# Patient Record
Sex: FEMALE | Race: WHITE | Employment: OTHER | URBAN - METROPOLITAN AREA
[De-identification: names, ages, dates, MRNs, and addresses within clinical notes are randomized per-mention and may not be internally consistent; named-entity substitution may affect disease eponyms.]

---

## 2017-02-12 ENCOUNTER — APPOINTMENT (OUTPATIENT)
Dept: GENERAL RADIOLOGY | Facility: CLINIC | Age: 72
End: 2017-02-12
Attending: EMERGENCY MEDICINE
Payer: COMMERCIAL

## 2017-02-12 ENCOUNTER — HOSPITAL ENCOUNTER (OUTPATIENT)
Facility: CLINIC | Age: 72
Setting detail: OBSERVATION
Discharge: HOME | End: 2017-02-13
Attending: EMERGENCY MEDICINE | Admitting: EMERGENCY MEDICINE
Payer: COMMERCIAL

## 2017-02-12 DIAGNOSIS — R07.9 CHEST PAIN, UNSPECIFIED TYPE: ICD-10-CM

## 2017-02-12 DIAGNOSIS — F25.0 SCHIZOAFFECTIVE DISORDER, BIPOLAR TYPE (H): ICD-10-CM

## 2017-02-12 DIAGNOSIS — J44.9 CHRONIC OBSTRUCTIVE PULMONARY DISEASE, UNSPECIFIED COPD TYPE (H): ICD-10-CM

## 2017-02-12 DIAGNOSIS — J44.1 COPD EXACERBATION (H): ICD-10-CM

## 2017-02-12 DIAGNOSIS — F25.9 SCHIZOAFFECTIVE DISORDER, UNSPECIFIED TYPE (H): ICD-10-CM

## 2017-02-12 LAB
ANION GAP SERPL CALCULATED.3IONS-SCNC: 6 MMOL/L (ref 3–14)
BASOPHILS # BLD AUTO: 0 10E9/L (ref 0–0.2)
BASOPHILS NFR BLD AUTO: 0.5 %
BUN SERPL-MCNC: 20 MG/DL (ref 7–30)
CALCIUM SERPL-MCNC: 8.7 MG/DL (ref 8.5–10.1)
CHLORIDE SERPL-SCNC: 104 MMOL/L (ref 94–109)
CO2 SERPL-SCNC: 33 MMOL/L (ref 20–32)
CREAT SERPL-MCNC: 0.79 MG/DL (ref 0.52–1.04)
D DIMER PPP FEU-MCNC: 0.4 UG/ML FEU (ref 0–0.5)
DIFFERENTIAL METHOD BLD: NORMAL
EOSINOPHIL # BLD AUTO: 0.1 10E9/L (ref 0–0.7)
EOSINOPHIL NFR BLD AUTO: 2.3 %
ERYTHROCYTE [DISTWIDTH] IN BLOOD BY AUTOMATED COUNT: 13.2 % (ref 10–15)
GFR SERPL CREATININE-BSD FRML MDRD: 72 ML/MIN/1.7M2
GLUCOSE SERPL-MCNC: 86 MG/DL (ref 70–99)
HCT VFR BLD AUTO: 42.4 % (ref 35–47)
HGB BLD-MCNC: 13.5 G/DL (ref 11.7–15.7)
IMM GRANULOCYTES # BLD: 0 10E9/L (ref 0–0.4)
IMM GRANULOCYTES NFR BLD: 0.6 %
LIPASE SERPL-CCNC: 149 U/L (ref 73–393)
LYMPHOCYTES # BLD AUTO: 2.5 10E9/L (ref 0.8–5.3)
LYMPHOCYTES NFR BLD AUTO: 41 %
MAGNESIUM SERPL-MCNC: 1.9 MG/DL (ref 1.6–2.3)
MCH RBC QN AUTO: 31.8 PG (ref 26.5–33)
MCHC RBC AUTO-ENTMCNC: 31.8 G/DL (ref 31.5–36.5)
MCV RBC AUTO: 100 FL (ref 78–100)
MONOCYTES # BLD AUTO: 0.5 10E9/L (ref 0–1.3)
MONOCYTES NFR BLD AUTO: 7.9 %
NEUTROPHILS # BLD AUTO: 3 10E9/L (ref 1.6–8.3)
NEUTROPHILS NFR BLD AUTO: 47.7 %
NRBC # BLD AUTO: 0 10*3/UL
NRBC BLD AUTO-RTO: 0 /100
NT-PROBNP SERPL-MCNC: 144 PG/ML (ref 0–900)
PHOSPHATE SERPL-MCNC: 3.6 MG/DL (ref 2.5–4.5)
PLATELET # BLD AUTO: 157 10E9/L (ref 150–450)
POTASSIUM SERPL-SCNC: 4.4 MMOL/L (ref 3.4–5.3)
RBC # BLD AUTO: 4.25 10E12/L (ref 3.8–5.2)
SODIUM SERPL-SCNC: 143 MMOL/L (ref 133–144)
TROPONIN I SERPL-MCNC: 0.02 UG/L (ref 0–0.04)
WBC # BLD AUTO: 6.2 10E9/L (ref 4–11)

## 2017-02-12 PROCEDURE — 84443 ASSAY THYROID STIM HORMONE: CPT | Performed by: PHYSICIAN ASSISTANT

## 2017-02-12 PROCEDURE — 99207 ZZC APP CREDIT; MD BILLING SHARED VISIT: CPT | Mod: Z6 | Performed by: EMERGENCY MEDICINE

## 2017-02-12 PROCEDURE — 36415 COLL VENOUS BLD VENIPUNCTURE: CPT | Performed by: PHYSICIAN ASSISTANT

## 2017-02-12 PROCEDURE — 99285 EMERGENCY DEPT VISIT HI MDM: CPT | Mod: 25 | Performed by: EMERGENCY MEDICINE

## 2017-02-12 PROCEDURE — 83690 ASSAY OF LIPASE: CPT | Performed by: EMERGENCY MEDICINE

## 2017-02-12 PROCEDURE — 93005 ELECTROCARDIOGRAM TRACING: CPT | Performed by: EMERGENCY MEDICINE

## 2017-02-12 PROCEDURE — 83735 ASSAY OF MAGNESIUM: CPT | Performed by: PHYSICIAN ASSISTANT

## 2017-02-12 PROCEDURE — G0378 HOSPITAL OBSERVATION PER HR: HCPCS

## 2017-02-12 PROCEDURE — 84100 ASSAY OF PHOSPHORUS: CPT | Performed by: PHYSICIAN ASSISTANT

## 2017-02-12 PROCEDURE — 93010 ELECTROCARDIOGRAM REPORT: CPT | Mod: Z6 | Performed by: EMERGENCY MEDICINE

## 2017-02-12 PROCEDURE — 80048 BASIC METABOLIC PNL TOTAL CA: CPT | Performed by: EMERGENCY MEDICINE

## 2017-02-12 PROCEDURE — 84484 ASSAY OF TROPONIN QUANT: CPT | Performed by: PHYSICIAN ASSISTANT

## 2017-02-12 PROCEDURE — 85379 FIBRIN DEGRADATION QUANT: CPT | Performed by: EMERGENCY MEDICINE

## 2017-02-12 PROCEDURE — 84100 ASSAY OF PHOSPHORUS: CPT | Performed by: EMERGENCY MEDICINE

## 2017-02-12 PROCEDURE — 84484 ASSAY OF TROPONIN QUANT: CPT | Performed by: EMERGENCY MEDICINE

## 2017-02-12 PROCEDURE — 25000132 ZZH RX MED GY IP 250 OP 250 PS 637: Performed by: PHYSICIAN ASSISTANT

## 2017-02-12 PROCEDURE — 85025 COMPLETE CBC W/AUTO DIFF WBC: CPT | Performed by: EMERGENCY MEDICINE

## 2017-02-12 PROCEDURE — 71020 XR CHEST 2 VW: CPT

## 2017-02-12 PROCEDURE — 83735 ASSAY OF MAGNESIUM: CPT | Performed by: EMERGENCY MEDICINE

## 2017-02-12 PROCEDURE — 83690 ASSAY OF LIPASE: CPT | Performed by: PHYSICIAN ASSISTANT

## 2017-02-12 PROCEDURE — 83880 ASSAY OF NATRIURETIC PEPTIDE: CPT | Performed by: EMERGENCY MEDICINE

## 2017-02-12 PROCEDURE — 99220 ZZC INITIAL OBSERVATION CARE,LEVL III: CPT | Mod: 25 | Performed by: PHYSICIAN ASSISTANT

## 2017-02-12 RX ORDER — LIDOCAINE 40 MG/G
CREAM TOPICAL
Status: DISCONTINUED | OUTPATIENT
Start: 2017-02-12 | End: 2017-02-13 | Stop reason: HOSPADM

## 2017-02-12 RX ORDER — ACETAMINOPHEN 650 MG/1
650 SUPPOSITORY RECTAL EVERY 4 HOURS PRN
Status: DISCONTINUED | OUTPATIENT
Start: 2017-02-12 | End: 2017-02-13 | Stop reason: HOSPADM

## 2017-02-12 RX ORDER — ACETAMINOPHEN 325 MG/1
650 TABLET ORAL EVERY 4 HOURS PRN
Status: DISCONTINUED | OUTPATIENT
Start: 2017-02-12 | End: 2017-02-13 | Stop reason: HOSPADM

## 2017-02-12 RX ORDER — ALUMINA, MAGNESIA, AND SIMETHICONE 2400; 2400; 240 MG/30ML; MG/30ML; MG/30ML
15-30 SUSPENSION ORAL EVERY 4 HOURS PRN
Status: DISCONTINUED | OUTPATIENT
Start: 2017-02-12 | End: 2017-02-13 | Stop reason: HOSPADM

## 2017-02-12 RX ORDER — NITROGLYCERIN 0.4 MG/1
0.4 TABLET SUBLINGUAL EVERY 5 MIN PRN
Status: DISCONTINUED | OUTPATIENT
Start: 2017-02-12 | End: 2017-02-13 | Stop reason: HOSPADM

## 2017-02-12 RX ORDER — QUETIAPINE FUMARATE 25 MG/1
25 TABLET, FILM COATED ORAL AT BEDTIME
Status: DISCONTINUED | OUTPATIENT
Start: 2017-02-12 | End: 2017-02-13 | Stop reason: HOSPADM

## 2017-02-12 RX ORDER — BUPROPION HYDROCHLORIDE 150 MG/1
450 TABLET ORAL EVERY MORNING
Status: DISCONTINUED | OUTPATIENT
Start: 2017-02-13 | End: 2017-02-13 | Stop reason: HOSPADM

## 2017-02-12 RX ORDER — ASPIRIN 81 MG/1
81 TABLET ORAL DAILY
Status: DISCONTINUED | OUTPATIENT
Start: 2017-02-13 | End: 2017-02-13 | Stop reason: HOSPADM

## 2017-02-12 RX ORDER — LORAZEPAM 1 MG/1
1 TABLET ORAL AT BEDTIME
Status: DISCONTINUED | OUTPATIENT
Start: 2017-02-12 | End: 2017-02-13 | Stop reason: HOSPADM

## 2017-02-12 RX ORDER — DULOXETIN HYDROCHLORIDE 30 MG/1
60 CAPSULE, DELAYED RELEASE ORAL EVERY MORNING
Status: DISCONTINUED | OUTPATIENT
Start: 2017-02-13 | End: 2017-02-13 | Stop reason: HOSPADM

## 2017-02-12 RX ORDER — TOLTERODINE 2 MG/1
2 CAPSULE, EXTENDED RELEASE ORAL EVERY MORNING
Status: DISCONTINUED | OUTPATIENT
Start: 2017-02-13 | End: 2017-02-13 | Stop reason: HOSPADM

## 2017-02-12 RX ORDER — NALOXONE HYDROCHLORIDE 0.4 MG/ML
.1-.4 INJECTION, SOLUTION INTRAMUSCULAR; INTRAVENOUS; SUBCUTANEOUS
Status: DISCONTINUED | OUTPATIENT
Start: 2017-02-12 | End: 2017-02-13 | Stop reason: HOSPADM

## 2017-02-12 RX ORDER — DIVALPROEX SODIUM 500 MG/1
1500 TABLET, EXTENDED RELEASE ORAL AT BEDTIME
Status: DISCONTINUED | OUTPATIENT
Start: 2017-02-12 | End: 2017-02-13 | Stop reason: HOSPADM

## 2017-02-12 RX ADMIN — DIVALPROEX SODIUM 1500 MG: 500 TABLET, EXTENDED RELEASE ORAL at 23:39

## 2017-02-12 RX ADMIN — LORAZEPAM 1 MG: 1 TABLET ORAL at 23:39

## 2017-02-12 RX ADMIN — QUETIAPINE FUMARATE 25 MG: 25 TABLET, FILM COATED ORAL at 23:39

## 2017-02-12 ASSESSMENT — ENCOUNTER SYMPTOMS
ADENOPATHY: 0
COLOR CHANGE: 0
LIGHT-HEADEDNESS: 0
NAUSEA: 0
NECK PAIN: 0
CHILLS: 0
DIZZINESS: 0
AGITATION: 0
VOMITING: 0
COUGH: 0
PALPITATIONS: 0
BRUISES/BLEEDS EASILY: 0
ABDOMINAL PAIN: 0
POLYDIPSIA: 0
BACK PAIN: 0
SHORTNESS OF BREATH: 0
NECK STIFFNESS: 0
DIFFICULTY URINATING: 0
FEVER: 0

## 2017-02-12 NOTE — IP AVS SNAPSHOT
Unit [address] Observation [address] [address]    Phone:  [phone]    Fax:  [phone]                                       After Visit Summary   2/12/2017    Lynda Delgadillo    MRN: [id number]           After Visit Summary Signature Page     I have received my discharge instructions, and my questions have been answered. I have discussed any challenges I see with this plan with the nurse or doctor.    ..........................................................................................................................................  Patient/Patient Representative Signature      ..........................................................................................................................................  Patient Representative Print Name and Relationship to Patient    ..................................................               ................................................  Date                                            Time    ..........................................................................................................................................  Reviewed by Signature/Title    ...................................................              ..............................................  Date                                                            Time

## 2017-02-13 ENCOUNTER — APPOINTMENT (OUTPATIENT)
Dept: CARDIOLOGY | Facility: CLINIC | Age: 72
End: 2017-02-13
Payer: COMMERCIAL

## 2017-02-13 VITALS
RESPIRATION RATE: 16 BRPM | BODY MASS INDEX: 40.9 KG/M2 | TEMPERATURE: 98 F | WEIGHT: 269.9 LBS | SYSTOLIC BLOOD PRESSURE: 139 MMHG | DIASTOLIC BLOOD PRESSURE: 95 MMHG | HEIGHT: 68 IN | OXYGEN SATURATION: 95 % | HEART RATE: 81 BPM

## 2017-02-13 LAB
LIPASE SERPL-CCNC: 132 U/L (ref 73–393)
MAGNESIUM SERPL-MCNC: 1.8 MG/DL (ref 1.6–2.3)
PHOSPHATE SERPL-MCNC: 3.2 MG/DL (ref 2.5–4.5)
TROPONIN I SERPL-MCNC: 0.01 UG/L (ref 0–0.04)
TROPONIN I SERPL-MCNC: NORMAL UG/L (ref 0–0.04)
TSH SERPL DL<=0.005 MIU/L-ACNC: 2.01 MU/L (ref 0.4–4)

## 2017-02-13 PROCEDURE — 93321 DOPPLER ECHO F-UP/LMTD STD: CPT | Mod: 26 | Performed by: INTERNAL MEDICINE

## 2017-02-13 PROCEDURE — 93325 DOPPLER ECHO COLOR FLOW MAPG: CPT | Mod: 26 | Performed by: INTERNAL MEDICINE

## 2017-02-13 PROCEDURE — 40000264 ECHO DOBUTAMINE STRESS TEST WITH DEFINITY

## 2017-02-13 PROCEDURE — 25000125 ZZHC RX 250: Performed by: INTERNAL MEDICINE

## 2017-02-13 PROCEDURE — 99217 ZZC OBSERVATION CARE DISCHARGE: CPT | Mod: Z6 | Performed by: NURSE PRACTITIONER

## 2017-02-13 PROCEDURE — 25000128 H RX IP 250 OP 636: Performed by: INTERNAL MEDICINE

## 2017-02-13 PROCEDURE — 40000556 ZZH STATISTIC PERIPHERAL IV START W US GUIDANCE

## 2017-02-13 PROCEDURE — 84484 ASSAY OF TROPONIN QUANT: CPT | Performed by: PHYSICIAN ASSISTANT

## 2017-02-13 PROCEDURE — 93016 CV STRESS TEST SUPVJ ONLY: CPT | Performed by: INTERNAL MEDICINE

## 2017-02-13 PROCEDURE — 0298T ZZC EXT ECG > 48HR TO 21 DAY REVIEW AND INTERPRETATN: CPT | Performed by: INTERNAL MEDICINE

## 2017-02-13 PROCEDURE — 25000125 ZZHC RX 250: Performed by: PHYSICIAN ASSISTANT

## 2017-02-13 PROCEDURE — G0378 HOSPITAL OBSERVATION PER HR: HCPCS

## 2017-02-13 PROCEDURE — 96365 THER/PROPH/DIAG IV INF INIT: CPT

## 2017-02-13 PROCEDURE — 25500064 ZZH RX 255 OP 636: Performed by: INTERNAL MEDICINE

## 2017-02-13 PROCEDURE — 36415 COLL VENOUS BLD VENIPUNCTURE: CPT | Performed by: PHYSICIAN ASSISTANT

## 2017-02-13 PROCEDURE — 93018 CV STRESS TEST I&R ONLY: CPT | Performed by: INTERNAL MEDICINE

## 2017-02-13 PROCEDURE — 93350 STRESS TTE ONLY: CPT | Mod: 26 | Performed by: INTERNAL MEDICINE

## 2017-02-13 RX ORDER — MAGNESIUM SULFATE HEPTAHYDRATE 40 MG/ML
4 INJECTION, SOLUTION INTRAVENOUS EVERY 4 HOURS PRN
Status: DISCONTINUED | OUTPATIENT
Start: 2017-02-13 | End: 2017-02-13 | Stop reason: HOSPADM

## 2017-02-13 RX ORDER — DOBUTAMINE HYDROCHLORIDE 200 MG/100ML
5-40 INJECTION INTRAVENOUS CONTINUOUS PRN
Status: COMPLETED | OUTPATIENT
Start: 2017-02-13 | End: 2017-02-13

## 2017-02-13 RX ADMIN — DOBUTAMINE HYDROCHLORIDE 30 MCG/KG/MIN: 200 INJECTION INTRAVENOUS at 08:38

## 2017-02-13 RX ADMIN — PERFLUTREN 7 ML: 6.52 INJECTION, SUSPENSION INTRAVENOUS at 08:45

## 2017-02-13 RX ADMIN — METOPROLOL TARTRATE 4 MG: 5 INJECTION INTRAVENOUS at 08:44

## 2017-02-13 RX ADMIN — Medication 2 G: at 04:12

## 2017-02-13 NOTE — PLAN OF CARE
"Problem: Discharge Planning  Goal: Discharge Planning (Adult, OB, Behavioral, Peds)  Outpatient/Observation goals to be met before discharge home:      - Serial troponins and stress test complete. - trop neg x2, next to be drawn at 0400.  - Seen and cleared by consultant if applicable - Pending  - Adequate pain control on oral analgesia - Yes pt denies chest pain.  - Vital signs normal or at patient baseline - yes /77 (BP Location: Right arm)  Pulse 81  Temp 98  F (36.7  C) (Oral)  Resp 18  Ht 1.727 m (5' 8\")  Wt 123.4 kg (272 lb)  SpO2 92%  BMI 41.36 kg/m2  - Safe disposition plan has been identified - pending    - Nurse to notify provider when observation goals have been met and patient is ready for discharge.     New PIV placed. Mg to be replaced for 1.8. Will recheck in AM.       "

## 2017-02-13 NOTE — PROGRESS NOTES
Discharge instructions reviewed, understood, and signed by patient. VSS, PIV removed, medications reviewed and understood, patient has all belongings. Patient's ride will be here at 1100.

## 2017-02-13 NOTE — PROGRESS NOTES
Pt here for dobutamine stress test.  Test, meds and side effects reviewed with patient.  Achieved target HR at 40 mcg Dobutamine.  Gave a total of 4 mg IV Metoprolol to bring HR back to baseline.  Post monitoring complete and VSS.  Pt transferred back to  via transport.

## 2017-02-13 NOTE — ED NOTES
Bed: ED  Expected date: 2/12/17  Expected time: 7:17 PM  Means of arrival: Ambulance  Comments:  [address]  77 y.o. F  Chest discomfort  Yellow

## 2017-02-13 NOTE — PROGRESS NOTES
"[facility] [place] [facility]  Daily Progress Note          Assessment & Plan:   Lynda Delgadillo is a 71 year old obese female with a history of bipolar disorder, lymphedema, OA, COPD presented from her senior living apartment via EMS with chest pain.    1. Chest Pain: No further pain since admission. Generally feeling well. No palpiations overnight. CXR with report of new findings suggestive of mild pulmonary vascular congestion, new mild bibasilar streaky opacities likely atelectasis vs pulmonary edema. Left chest wall tenderness on exam.  - Serial troponins negative   - Continuous telemetry showed 9 beat run of atrial tachycardia overnight. Reviewed strip ? PAC's. Discussed with Cardiology and reviewed TELE, ok to proceed with [place] Stress Test this am.   -Plan for zio patch for 1 week upon discharge given history of some palpitations. -  - ASA 81mg daily  - Clear liquid diet      2. Mental Health: States mood is stable.   - Continue Cymbalta, Wellbutrin XL, Abilify, Depakote ER, Ativan, Seroquel per home regimen             Consults:   N/A          Discharge Planning:   Back to [facility] pending stress test results         Interval History:   Generally feeling well. No further chest pain. Discussed TELE overnight. No palpitations overnight but she states she does have occasional palpitations at home. This is a chronic issue with no acute changes. She has chronic lymphedema, which is unchanged and symmetric.     ROS:   Constitutional: No fevers/chills. Tolerating diet.   Cardiovascular: No chest pain or palpitations.   Respiratory: No cough or SOB.   GI: No abdominal pain. No N/V.      : No urinary complaints.   Musculoskeletal: Denies pain.           Physical Exam:   BP (!) 139/95 (BP Location: Right arm)  Pulse 81  Temp 98  F (36.7  C) (Oral)  Resp 16  Ht 1.727 m (5' 8\")  Wt 122.4 kg (269 lb 14.4 oz)  SpO2 95%  BMI 41.04 kg/m2     GENERAL: Alert and oriented x 3. NAD.   HEENT: " Anicteric sclera. Mucous membranes moist.   CV: RRR. S1, S2. No murmurs appreciated.   RESPIRATORY: Effort normal. Lungs CTAB with no wheezing, rales, rhonchi.   GI: Abdomen soft and non distended with normoactive bowel sounds present in all quadrants. No tenderness, rebound, guarding.   NEUROLOGICAL: No focal deficits. Moves all extremities.    EXTREMITIES: No peripheral edema. + 1 non-pitting edema to bilateral lower extremities. Intact bilateral pedal pulses.   SKIN: No jaundice. No rashes.     Medication list reviewed.   Today's labs and imaging were reviewed.     JAC Foreman, CNP  Emergency Department Observation Unit
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
## 2017-02-13 NOTE — PLAN OF CARE
"Problem: Discharge Planning  Goal: Discharge Planning (Adult, OB, Behavioral, Peds)  Outpatient/Observation goals to be met before discharge home:     - Serial troponins and stress test complete. - trop neg x2, next to be drawn at 0400.  - Seen and cleared by consultant if applicable - Pending  - Adequate pain control on oral analgesia - Yes pt denies chest pain.  - Vital signs normal or at patient baseline - yes /77 (BP Location: Right arm)  Pulse 81  Temp 98  F (36.7  C) (Oral)  Resp 18  Ht 1.727 m (5' 8\")  Wt 123.4 kg (272 lb)  SpO2 92%  BMI 41.36 kg/m2  - Safe disposition plan has been identified - pending    - Nurse to notify provider when observation goals have been met and patient is ready for discharge.     Admission checklist completed. Observation sheet given to patient. No further questions or concerns. IV SL. Will continue to monitor.   "

## 2017-02-13 NOTE — PLAN OF CARE
"Problem: Discharge Planning  Goal: Discharge Planning (Adult, OB, Behavioral, Peds)  Outpatient/Observation goals to be met before discharge home:     - Serial troponins and stress test complete. - trop neg x3  - Seen and cleared by consultant if applicable - Pending  - Adequate pain control on oral analgesia - Yes pt denies chest pain.  - Vital signs normal or at patient baseline - yes BP (!) 139/95 (BP Location: Right arm)  Pulse 81  Temp 98  F (36.7  C) (Oral)  Resp 16  Ht 1.727 m (5' 8\")  Wt 122.4 kg (269 lb 14.4 oz)  SpO2 95%  BMI 41.04 kg/m2  - Safe disposition plan has been identified - pending    - Nurse to notify provider when observation goals have been met and patient is ready for discharge.      Ok per BEN to not recheck Mg replacement. Will continue to monitor.           "

## 2017-02-13 NOTE — DISCHARGE SUMMARY
"ED Observation Discharge Summary    Lynda Delgadillo   MRN# [id number]  Age: 71 year old   YOB: 1945            Date of Admission: 02/12/2017    Date of Discharge: 02/13/2017  Admitting Physician: Dr. Owen Bloom MD  Discharge Physician: Dr. Steffanie MD  NP/PA: Nathalie Jolley CNP      DISCHARGE DIAGNOSIS:   1. Atypical Chest Pain  2. Schizoaffective Disorder, bipolar type  3. COPD Exacerbation    INTERVAL HISTORY: VSS, Afebrile. No chest pain/pressure, SOB. Generally feeling well and feels ready to discharge to home. Agrees to plan for zio patch and follow-up with PCP next week.       PHYSICAL EXAM:   Blood pressure (!) 139/95, pulse 81, temperature 98  F (36.7  C), temperature source Oral, resp. rate 16, height 1.727 m (5' 8\"), weight 122.4 kg (269 lb 14.4 oz), SpO2 95 %.     GENERAL: Alert and oriented x 3. NAD.   HEENT: Anicteric sclera. Mucous membranes moist.   CV: RRR. S1, S2. No murmurs appreciated.   RESPIRATORY: Effort normal. Lungs CTAB with no wheezing, rales, rhonchi.   GI: Abdomen soft and non distended with normoactive bowel sounds present in all quadrants. No tenderness, rebound, guarding.   NEUROLOGICAL: No focal deficits. Moves all extremities.   EXTREMITIES: No peripheral edema. + 1 non-pitting edema to bilateral lower extremities. Intact bilateral pedal pulses.   SKIN: No jaundice. No rashes.      PROCEDURES AND IMAGING:   Results for orders placed or performed during the hospital encounter of 02/12/17 (from the past 24 hour(s))   EKG 12-lead, tracing only   Result Value Ref Range    Interpretation ECG Click View Image link to view waveform and result    XR Chest 2 Views    Narrative    Exam: XR CHEST 2 VW, 2/12/2017 7:57 PM    Indication: chest pain    Comparison: 12/25/2016    Findings:   New mild perihilar interstitial prominence. New bibasilar streaky  opacities. No pleural effusion. Heart size is normal. No pneumothorax.  Upper abdomen is unremarkable.      " Impression    Impression:   1. New findings suggestive of mild pulmonary vascular congestion.  2. New mild bibasilar streaky opacities, likely atelectasis versus  pulmonary edema.    I have personally reviewed the examination and initial interpretation  and I agree with the findings.    KULDEEP SWARTZ MD   CBC with platelets differential   Result Value Ref Range    WBC 6.2 4.0 - 11.0 10e9/L    RBC Count 4.25 3.8 - 5.2 10e12/L    Hemoglobin 13.5 11.7 - 15.7 g/dL    Hematocrit 42.4 35.0 - 47.0 %     78 - 100 fl    MCH 31.8 26.5 - 33.0 pg    MCHC 31.8 31.5 - 36.5 g/dL    RDW 13.2 10.0 - 15.0 %    Platelet Count 157 150 - 450 10e9/L    Diff Method Automated Method     % Neutrophils 47.7 %    % Lymphocytes 41.0 %    % Monocytes 7.9 %    % Eosinophils 2.3 %    % Basophils 0.5 %    % Immature Granulocytes 0.6 %    Nucleated RBCs 0 0 /100    Absolute Neutrophil 3.0 1.6 - 8.3 10e9/L    Absolute Lymphocytes 2.5 0.8 - 5.3 10e9/L    Absolute Monocytes 0.5 0.0 - 1.3 10e9/L    Absolute Eosinophils 0.1 0.0 - 0.7 10e9/L    Absolute Basophils 0.0 0.0 - 0.2 10e9/L    Abs Immature Granulocytes 0.0 0 - 0.4 10e9/L    Absolute Nucleated RBC 0.0    Basic metabolic panel   Result Value Ref Range    Sodium 143 133 - 144 mmol/L    Potassium 4.4 3.4 - 5.3 mmol/L    Chloride 104 94 - 109 mmol/L    Carbon Dioxide 33 (H) 20 - 32 mmol/L    Anion Gap 6 3 - 14 mmol/L    Glucose 86 70 - 99 mg/dL    Urea Nitrogen 20 7 - 30 mg/dL    Creatinine 0.79 0.52 - 1.04 mg/dL    GFR Estimate 72 >60 mL/min/1.7m2    GFR Estimate If Black 88 >60 mL/min/1.7m2    Calcium 8.7 8.5 - 10.1 mg/dL   Troponin I   Result Value Ref Range    Troponin I ES 0.016 0.000 - 0.045 ug/L   D dimer quantitative   Result Value Ref Range    D Dimer 0.4 0.0 - 0.50 ug/ml FEU   Nt probnp inpatient (BNP)   Result Value Ref Range    N-Terminal Pro BNP Inpatient 144 0 - 900 pg/mL   Lipase   Result Value Ref Range    Lipase 149 73 - 393 U/L   Magnesium   Result Value Ref Range     Magnesium 1.9 1.6 - 2.3 mg/dL   Phosphorus   Result Value Ref Range    Phosphorus 3.6 2.5 - 4.5 mg/dL   Magnesium   Result Value Ref Range    Magnesium 1.8 1.6 - 2.3 mg/dL   Phosphorus   Result Value Ref Range    Phosphorus 3.2 2.5 - 4.5 mg/dL   Lipase   Result Value Ref Range    Lipase 132 73 - 393 U/L   Troponin I - Now then in 4 hours x 2    Result Value Ref Range    Troponin I ES  0.000 - 0.045 ug/L     <0.015  The 99th percentile for upper reference range is 0.045 ug/L.  Troponin values in   the range of 0.045 - 0.120 ug/L may be associated with risks of adverse   clinical events.     TSH with free T4 reflex   Result Value Ref Range    TSH 2.01 0.40 - 4.00 mU/L   Zio Patch Holter    Narrative    UNIT 6D OBSERVATION [facility] EAST [address] [address]  [phone]  2017      Patient:  Lynda Wyman  Chart: [id number]  :  1945  Age:  71 year old  Sex:  female       Procedure:  ZioPatch Monitor.        Technician performing hook-up:  Brandon Leigh     Troponin I - Now then in 4 hours x 2    Result Value Ref Range    Troponin I ES 0.015 0.000 - 0.045 ug/L   Echo  stress test with definity    Narrative    [id number]  [id number]  [id number]  [id number]^STORMY^JERICA^TYLER           [facility],[place]  Echocardiography Laboratory  [address] [place] [id number]     Name: LYNDA WYMAN  MRN: [id number]  : 1945  Study Date: 2017 08:34 AM  Age: 71 yrs  Gender: Female  Patient Location: [facility]  Reason For Study: Chest Pressure  Ordering Physician: JERICA BURROWS  Performed By: Sarah Corrales RDCS, RVT     BSA: 2.3 m2  Height: 68 in  Weight: 271 lb  HR: 80  BP: 143/97 mmHg  _____________________________________________________________________________  __     _____________________________________________________________________________  __        Interpretation Summary  Normal dobutamine stress echocardiogram without evidence of  inducible  ischemia. Target heart rate was achieved. Heart rate and blood pressure  response to dobutamine were normal. With stress, the left ventricular ejection  fraction increased from 55-60% to greater than 65% and the left ventricular  size decreased appropriately.  No subjective symptoms to suggest ischemia.  There was no ECG evidence of ischemia.  No significant valve disease on screening doppler evaluation. The aortic root  and visualized ascending aorta are normal.     _____________________________________________________________________________  __     Stress  The patient did not exhibit any symptoms during drug infusion.  The drug infusion was stopped due to target heart rate achieved.  The maximum dose of dobutamine was 40mcg/kg/min.  The maximum dose of metoprolol was 4mg.  Patient was given 7ml mixture of 1.5ml Definity and 8.5ml saline.  3 ml wasted.  Definity Expiration 1/1/18 .  Definity Lot # [id number] .  This was a normal stress EKG with no evidence of stress-induced ischemia.     Baseline  Normal baseline electrocardiogram.     Stress Results                                       Maximum Predicted HR:   151 bpm             Target HR: 128 bpm        % Maximum Predicted HR: 92 %                             Stage  DurationHeart Rate  BP                                 (mm:ss)   (bpm)                         Baseline            80    143/97                           Peak    4:44     139    169/82                            Stress Duration:   4:44 mm:ss *                      Maximum Stress HR: 139 bpm *     Left Ventricle  Left ventricular systolic function is normal. The visual ejection fraction is  estimated at 55-60%.     Right Ventricle  The right ventricle is normal in size and function.     Mitral Valve  The mitral valve is normal in structure and function.        Tricuspid Valve  The tricuspid valve is normal in structure and function.     Pericardium  There is no pericardial effusion.    "  Procedure  Dobutamine Echo Complete. Contrast Definity.  _____________________________________________________________________________  __     MMode/2D Measurements & Calculations  asc Aorta Diam: 3.7 cm                 _____________________________________________________________________________  __           Report approved by: Brendon Prasad 02/13/2017 10:01 AM        DISCHARGE MEDICATIONS:   Current Discharge Medication List      CONTINUE these medications which have NOT CHANGED    Details   divalproex (DEPAKOTE ER) 500 MG 24 hr tablet Take 3 tablets (1,500 mg) by mouth At Bedtime  Qty: 90 tablet, Refills: 0      QUEtiapine (SEROQUEL) 25 MG tablet Take 1 tablet (25 mg) by mouth nightly as needed  Qty: 30 tablet, Refills: 0      buPROPion (WELLBUTRIN XL) 300 MG 24 hr tablet Take 1 tablet (300 mg) by mouth every morning  Qty: 30 tablet, Refills: 1    Associated Diagnoses: Schizoaffective disorder, unspecified type (H)      ARIPiprazole (ABILIFY) 20 MG tablet Take 25 mg by mouth every morning       DULoxetine (CYMBALTA) 60 MG capsule Take 60 mg by mouth every morning      LORazepam (ATIVAN) 0.5 MG tablet Take 1 mg by mouth At Bedtime Dose = 1-2 x 0.5 mg tablets       tolterodine (DETROL LA) 2 MG 24 hr capsule Take 2 mg by mouth every morning               CONSULTATIONS:   Consultation during this admission received from:  N/A    BRIEF HISTORY OF PRESENT ILLNESS:   (Adopted from admission H&P).    \"Lynda Delgadillo is a 71 year old obese female with a history of bipolar disorder, lymphedema, OA, COPD presented from her senior living apartment via EMS with chest pain. She was sitting in the community area around 1800 this evening when she had a sudden onset of left chest pressure and heaviness that lasted about 5-7 minutes, no radiation, no associated symptoms of lightheadedness, SOB, paresthesias, palpitations, nausea, vomiting, headache, diaphoresis. The person sitting next to her called EMS. Denies any " "recent changes in her cough, phlegm color, cold, congestion, fever, chills, heartburn, gassy, bloated, increase in anxiety, heavy lifting. Denies any history of anything similar. She denies ever having a stress test done. She has a long standing history of smoking about 2.5 ppd for 40 years and quit in 1999. Denies any family history of CAD. Her father had what sounds like a ruptured aortic aneurysm.      In the ED, HR 80's, 's-140's/70's-90's, RR 16-21, SaO2 92-97% on RA, Temp 97.8. Labs show normal BMP, BNP, CBC, D-dimer. Troponin I 0.016. EKG NSR without acute findings. CXR with report of new findings suggestive of mild pulmonary vascular congestion, new mild bibasilar streaky opacities likely atelectasis vs pulmonary edema. She is being admitted to the Observation Unit for ACS r/o.     On admission to the observation unit the patient was stable with no complaints.\"    ED OBSERVATION COURSE:  Lynda Delgadillo is a 71 year old obese female with a history of bipolar disorder, lymphedema, OA, COPD presented from her senior living apartment via EMS with chest pain.     1. Chest Pain: No further pain since EMS arrived and no pain during ED stay/admission. Generally feeling well. No palpiations overnight. CXR with report of new findings suggestive of mild pulmonary vascular congestion, new mild bibasilar streaky opacities likely atelectasis vs pulmonary edema. BNP normal. Left chest wall tenderness on exam. Serial troponins negative   - Continuous telemetry showed 9 beat run of atrial tachycardia overnight. Reviewed strip ? PAC's. Discussed with Cardiology and reviewed TELE, ok to proceed with [place] Stress Test, which was normal. Will discharge with zio patch for 1 week given history of palpitations and 9 beat run of ? PAC's.         2. Mental Health: States mood is stable.  Continue Cymbalta, Wellbutrin XL, Abilify, Depakote ER, Ativan, Seroquel per home regimen      DISCHARGE DISPOSITION:   Discharge to " Senior Living Apartment.     DISCHARGE INSTRUCTIONS AND FOLLOW-UP: The patient agrees to the following discharge instruction and recommendations.     Discharge Procedure Orders  Adult [facility] Follow-up and recommended labs and tests   Order Comments: Follow up with primary care provider, Owen Jurado, within 7 days for hospital follow- up.  The following labs/tests are recommended: Zio patch results .      Appointments on [place] and/or [facility] (with [facility] or [facility] provider or service). Call [phone] if you haven't heard regarding these appointments within 7 days of discharge.     Activity   Order Comments: Your activity upon discharge: activity as tolerated   Order Specific Question Answer Comments   Is discharge order? Yes      When to contact your care team   Order Comments: Return to the ED with fever, uncontrolled nausea, vomiting, unrelieved pain, bleeding not relieved with pressure, dizziness, chest pain, shortness of breath, loss of consciousness, and any new or concerning symptoms.     Please go to your nearest emergency room If you were to have a change in the type of chest pain i.e more severe, lasting longer or radiating to your shoulder, arm, neck, jaw or back, shortness of breath or increased pain with breathing, coughing up blood, feel dizzy or lightheaded, or notice swelling in one leg.     Reason for your hospital stay   Order Comments: The chest pain you came into the emergency room for does not appear to be cardiac chest pain. Your heart enzymes were normal which tells us there was no damage to the heart muscle. Your stress test was normal. We monitored your heart rhythm overnight and you did have 9 beat of a fast rhythm. We will discharge you with a ziopatch in place to see if you are having any abnormal rhythms at home. Please follow-up with your primary care doctor to discuss these results.     I am not sure what caused your pain but it is reassuring that it resolved and  has not returned during your admission. I recommend continuing your home medications and it will be very important to follow up with your primary care.     Continue to drink plenty of fluids.    Please note, we did not make any changes to any of your home medications. Please resume these as previously prescribed.     Full Code     Diet   Order Comments: Follow this diet upon discharge: Regular Diet   Order Specific Question Answer Comments   Is discharge order? Yes         Attestation:   I have reviewed today's vital signs, notes, medications, labs and imaging.      JAC Foreman, CNP  Emergency Department Observation Unit

## 2017-02-13 NOTE — ED NOTES
Patient BIBA for chest pain. Patient had chest pain for about 10 min, chest pain was gone before EMS got to patient's apartment. Patient denies chest pain, shortness of breath at this moment.
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
## 2017-02-13 NOTE — PROVIDER NOTIFICATION
"BEN notified that pt had 9 beats of atrial tachycardia with a rate of 145 at 0211. Pt currently denies chest pain, no SOB. /77 (BP Location: Right arm)  Pulse 81  Temp 98  F (36.7  C) (Oral)  Resp 18  Ht 1.727 m (5' 8\")  Wt 123.4 kg (272 lb)  SpO2 92%  BMI 41.36 kg/m2 Will continue to monitor closely. Mg currently being replaced.   "

## 2017-02-13 NOTE — ED PROVIDER NOTES
"S:patient admit to obs for chest pain. Patient had some cp described as pressure yesterday at rest. Sx lasted 10-15 minutes and resolved with 911 arrival. No other sx. Patient now feels fine and at baseline. Reports hx of occ palpitations unchanged.  No sob or change in chronic cough or change in leg swelling.  O:BP (!) 139/95 (BP Location: Right arm)  Pulse 81  Temp 98  F (36.7  C) (Oral)  Resp 16  Ht 1.727 m (5' 8\")  Wt 123.4 kg (272 lb)  SpO2 95%  BMI 41.36 kg/m2  General patient sitting edge of bed in no distress Head: Normocephalic, atraumatic.  Eyes: Conjunctiva clear, non icteric. PERRLA.  Ears: External ears and TMs normal BL.  Nose: Septum midline, nasal mucosa pink and moist. No discharge.  Mouth / Throat: Normal dentition.  No oral lesions. Pharynx non erythematous, tonsils without hypertrophy.  Neck: Supple, no enlarged LN, trachea midline.Lung CTA CV RRR Ab nontender  Ext with edema bilateral without changes. Neuro nonfocal Psych flat affect  Results for orders placed or performed during the hospital encounter of 02/12/17   XR Chest 2 Views    Narrative    Exam: XR CHEST 2 VW, 2/12/2017 7:57 PM    Indication: chest pain    Comparison: 12/25/2016    Findings:   New mild perihilar interstitial prominence. New bibasilar streaky  opacities. No pleural effusion. Heart size is normal. No pneumothorax.  Upper abdomen is unremarkable.      Impression    Impression:   1. New findings suggestive of mild pulmonary vascular congestion.  2. New mild bibasilar streaky opacities, likely atelectasis versus  pulmonary edema.    I have personally reviewed the examination and initial interpretation  and I agree with the findings.    KULDEEP SWARTZ MD   CBC with platelets differential   Result Value Ref Range    WBC 6.2 4.0 - 11.0 10e9/L    RBC Count 4.25 3.8 - 5.2 10e12/L    Hemoglobin 13.5 11.7 - 15.7 g/dL    Hematocrit 42.4 35.0 - 47.0 %     78 - 100 fl    MCH 31.8 26.5 - 33.0 pg    MCHC 31.8 31.5 - 36.5 g/dL    " RDW 13.2 10.0 - 15.0 %    Platelet Count 157 150 - 450 10e9/L    Diff Method Automated Method     % Neutrophils 47.7 %    % Lymphocytes 41.0 %    % Monocytes 7.9 %    % Eosinophils 2.3 %    % Basophils 0.5 %    % Immature Granulocytes 0.6 %    Nucleated RBCs 0 0 /100    Absolute Neutrophil 3.0 1.6 - 8.3 10e9/L    Absolute Lymphocytes 2.5 0.8 - 5.3 10e9/L    Absolute Monocytes 0.5 0.0 - 1.3 10e9/L    Absolute Eosinophils 0.1 0.0 - 0.7 10e9/L    Absolute Basophils 0.0 0.0 - 0.2 10e9/L    Abs Immature Granulocytes 0.0 0 - 0.4 10e9/L    Absolute Nucleated RBC 0.0    Basic metabolic panel   Result Value Ref Range    Sodium 143 133 - 144 mmol/L    Potassium 4.4 3.4 - 5.3 mmol/L    Chloride 104 94 - 109 mmol/L    Carbon Dioxide 33 (H) 20 - 32 mmol/L    Anion Gap 6 3 - 14 mmol/L    Glucose 86 70 - 99 mg/dL    Urea Nitrogen 20 7 - 30 mg/dL    Creatinine 0.79 0.52 - 1.04 mg/dL    GFR Estimate 72 >60 mL/min/1.7m2    GFR Estimate If Black 88 >60 mL/min/1.7m2    Calcium 8.7 8.5 - 10.1 mg/dL   Troponin I   Result Value Ref Range    Troponin I ES 0.016 0.000 - 0.045 ug/L   D dimer quantitative   Result Value Ref Range    D Dimer 0.4 0.0 - 0.50 ug/ml FEU   Nt probnp inpatient (BNP)   Result Value Ref Range    N-Terminal Pro BNP Inpatient 144 0 - 900 pg/mL   Magnesium   Result Value Ref Range    Magnesium 1.8 1.6 - 2.3 mg/dL   Phosphorus   Result Value Ref Range    Phosphorus 3.2 2.5 - 4.5 mg/dL   Lipase   Result Value Ref Range    Lipase 132 73 - 393 U/L   Troponin I - Now then in 4 hours x 2    Result Value Ref Range    Troponin I ES  0.000 - 0.045 ug/L     <0.015  The 99th percentile for upper reference range is 0.045 ug/L.  Troponin values in   the range of 0.045 - 0.120 ug/L may be associated with risks of adverse   clinical events.     Troponin I - Now then in 4 hours x 2    Result Value Ref Range    Troponin I ES 0.015 0.000 - 0.045 ug/L   Lipase   Result Value Ref Range    Lipase 149 73 - 393 U/L   Magnesium   Result Value  Ref Range    Magnesium 1.9 1.6 - 2.3 mg/dL   Phosphorus   Result Value Ref Range    Phosphorus 3.6 2.5 - 4.5 mg/dL   TSH with free T4 reflex   Result Value Ref Range    TSH 2.01 0.40 - 4.00 mU/L   EKG 12-lead, tracing only   Result Value Ref Range    Interpretation ECG Click View Image link to view waveform and result          A:Chest Pain resolved      Palpitations hx  P:Dobutamine Stress today.     consided Zio patch for episode of palpitations with follow up.

## 2017-02-13 NOTE — PROGRESS NOTES
Care Coordinator Progress Note     Admission Date/Time:  2/12/2017  Attending MD:  Owen Bloom*     Data  Chart reviewed, discussed with interdisciplinary team.   Patient was admitted for:    Chest pain, unspecified type  Schizoaffective disorder, unspecified type (H).    Concerns with insurance coverage for discharge needs: None.  Current Living Situation: Patient lives alone.  Support System: Supportive  Services Involved: none  Transportation: [place] transportation,  [employer] [facility] [phone]  Barriers to Discharge: cognitively impaired and lives alone    Coordination of Care and Referrals: Provided patient/family with options for ride home.        Assessment   At bedside to discuss DC with pt; Lynda states she does not have enough money for a cab ride home and states her medical transportation does not provide same day transportation. Writer called [id number] and  time scheduled for 1140 am via [employer]. Pt and pt primary RNJarvis, notified.      Plan  Anticipated Discharge Date:  02/13/17  Anticipated Discharge Plan: DC with provider recommendations.     Sushma Perez RN [facility]/6D Obs  [phone]

## 2017-02-13 NOTE — PLAN OF CARE
"Problem: Discharge Planning  Goal: Discharge Planning (Adult, OB, Behavioral, Peds)  Outpatient/Observation goals to be met before discharge home:      - Serial troponins and stress test complete. - trop neg x3  - Seen and cleared by consultant if applicable - Pending  - Adequate pain control on oral analgesia - Yes pt denies chest pain.  - Vital signs normal or at patient baseline - yes /77 (BP Location: Right arm) Pulse 81 Temp 98  F (36.7  C) (Oral) Resp 18 Ht 1.727 m (5' 8\") Wt 123.4 kg (272 lb) SpO2 92% BMI 41.36 kg/m2  - Safe disposition plan has been identified - pending    - Nurse to notify provider when observation goals have been met and patient is ready for discharge.      Ok per BEN to not recheck Mg replacement. Will continue to monitor.          "

## 2017-02-14 LAB — INTERPRETATION ECG - MUSE: NORMAL

## 2017-09-27 ENCOUNTER — APPOINTMENT (OUTPATIENT)
Dept: GENERAL RADIOLOGY | Facility: CLINIC | Age: 72
End: 2017-09-27
Attending: EMERGENCY MEDICINE
Payer: COMMERCIAL

## 2017-09-27 ENCOUNTER — HOSPITAL ENCOUNTER (EMERGENCY)
Facility: CLINIC | Age: 72
Discharge: HOME | End: 2017-09-27
Attending: EMERGENCY MEDICINE | Admitting: EMERGENCY MEDICINE
Payer: COMMERCIAL

## 2017-09-27 VITALS
BODY MASS INDEX: 40.78 KG/M2 | OXYGEN SATURATION: 95 % | TEMPERATURE: 98.2 F | RESPIRATION RATE: 16 BRPM | HEART RATE: 86 BPM | WEIGHT: 268.19 LBS | DIASTOLIC BLOOD PRESSURE: 72 MMHG | SYSTOLIC BLOOD PRESSURE: 122 MMHG

## 2017-09-27 DIAGNOSIS — M25.50 PAIN IN JOINT, MULTIPLE SITES: ICD-10-CM

## 2017-09-27 DIAGNOSIS — M25.511 ACUTE PAIN OF RIGHT SHOULDER: ICD-10-CM

## 2017-09-27 LAB
ANION GAP SERPL CALCULATED.3IONS-SCNC: 5 MMOL/L (ref 3–14)
BASOPHILS # BLD AUTO: 0 10E9/L (ref 0–0.2)
BASOPHILS NFR BLD AUTO: 0.3 %
BUN SERPL-MCNC: 28 MG/DL (ref 7–30)
CALCIUM SERPL-MCNC: 8.1 MG/DL (ref 8.5–10.1)
CHLORIDE SERPL-SCNC: 105 MMOL/L (ref 94–109)
CO2 SERPL-SCNC: 34 MMOL/L (ref 20–32)
CREAT SERPL-MCNC: 0.78 MG/DL (ref 0.52–1.04)
CRP SERPL-MCNC: 3.2 MG/L (ref 0–8)
DIFFERENTIAL METHOD BLD: NORMAL
EOSINOPHIL # BLD AUTO: 0.1 10E9/L (ref 0–0.7)
EOSINOPHIL NFR BLD AUTO: 1.5 %
ERYTHROCYTE [DISTWIDTH] IN BLOOD BY AUTOMATED COUNT: 12.4 % (ref 10–15)
ERYTHROCYTE [SEDIMENTATION RATE] IN BLOOD BY WESTERGREN METHOD: 8 MM/H (ref 0–30)
GFR SERPL CREATININE-BSD FRML MDRD: 73 ML/MIN/1.7M2
GLUCOSE SERPL-MCNC: 91 MG/DL (ref 70–99)
HCT VFR BLD AUTO: 43.1 % (ref 35–47)
HGB BLD-MCNC: 14.1 G/DL (ref 11.7–15.7)
IMM GRANULOCYTES # BLD: 0.1 10E9/L (ref 0–0.4)
IMM GRANULOCYTES NFR BLD: 0.8 %
LYMPHOCYTES # BLD AUTO: 2.4 10E9/L (ref 0.8–5.3)
LYMPHOCYTES NFR BLD AUTO: 39.1 %
MCH RBC QN AUTO: 32 PG (ref 26.5–33)
MCHC RBC AUTO-ENTMCNC: 32.7 G/DL (ref 31.5–36.5)
MCV RBC AUTO: 98 FL (ref 78–100)
MONOCYTES # BLD AUTO: 0.4 10E9/L (ref 0–1.3)
MONOCYTES NFR BLD AUTO: 6.8 %
NEUTROPHILS # BLD AUTO: 3.2 10E9/L (ref 1.6–8.3)
NEUTROPHILS NFR BLD AUTO: 51.5 %
NRBC # BLD AUTO: 0 10*3/UL
NRBC BLD AUTO-RTO: 0 /100
PLATELET # BLD AUTO: 183 10E9/L (ref 150–450)
POTASSIUM SERPL-SCNC: 4.5 MMOL/L (ref 3.4–5.3)
RBC # BLD AUTO: 4.4 10E12/L (ref 3.8–5.2)
SODIUM SERPL-SCNC: 144 MMOL/L (ref 133–144)
TSH SERPL DL<=0.005 MIU/L-ACNC: 2.18 MU/L (ref 0.4–4)
WBC # BLD AUTO: 6.2 10E9/L (ref 4–11)

## 2017-09-27 PROCEDURE — 99283 EMERGENCY DEPT VISIT LOW MDM: CPT | Mod: Z6 | Performed by: EMERGENCY MEDICINE

## 2017-09-27 PROCEDURE — 86140 C-REACTIVE PROTEIN: CPT | Performed by: EMERGENCY MEDICINE

## 2017-09-27 PROCEDURE — 85025 COMPLETE CBC W/AUTO DIFF WBC: CPT | Performed by: EMERGENCY MEDICINE

## 2017-09-27 PROCEDURE — 85652 RBC SED RATE AUTOMATED: CPT | Performed by: EMERGENCY MEDICINE

## 2017-09-27 PROCEDURE — 80048 BASIC METABOLIC PNL TOTAL CA: CPT | Performed by: EMERGENCY MEDICINE

## 2017-09-27 PROCEDURE — 73030 X-RAY EXAM OF SHOULDER: CPT | Mod: RT

## 2017-09-27 PROCEDURE — 99284 EMERGENCY DEPT VISIT MOD MDM: CPT | Performed by: EMERGENCY MEDICINE

## 2017-09-27 PROCEDURE — 86431 RHEUMATOID FACTOR QUANT: CPT | Performed by: EMERGENCY MEDICINE

## 2017-09-27 PROCEDURE — 84443 ASSAY THYROID STIM HORMONE: CPT | Performed by: EMERGENCY MEDICINE

## 2017-09-27 RX ORDER — DIVALPROEX SODIUM 500 MG/1
TABLET, DELAYED RELEASE ORAL 3 TIMES DAILY
COMMUNITY
End: 2017-09-27

## 2017-09-27 RX ORDER — DIVALPROEX SODIUM 500 MG/1
1500 TABLET, EXTENDED RELEASE ORAL AT BEDTIME
COMMUNITY
End: 2018-01-14

## 2017-09-27 ASSESSMENT — ENCOUNTER SYMPTOMS
SORE THROAT: 0
ABDOMINAL PAIN: 0
NUMBNESS: 0
MYALGIAS: 1
DYSURIA: 0
SHORTNESS OF BREATH: 0
TREMORS: 1
CHILLS: 0
ARTHRALGIAS: 1
WEAKNESS: 0
COUGH: 0
HEMATURIA: 0
RHINORRHEA: 0
FEVER: 0

## 2017-09-27 NOTE — ED AVS SNAPSHOT
[facility], Emergency Department    [address]    [place] [address]    Phone:  [phone]    Fax:  [phone]                                       Lynda Delgadillo   MRN: [id number]    Department:  [facility], Emergency Department   Date of Visit:  9/27/2017           Patient Information     Date Of Birth          1945        Your diagnoses for this visit were:     Acute pain of right shoulder     Pain in joint, multiple sites        You were seen by Margarita Stevens MD.        Discharge Instructions       Please make an appointment to follow up with Orthopedics (phone: [phone]) within 7 days.   Take ibuprofen 600 mg every 6 hours or Aleve 2 tablets twice a day for pain as needed.  Take this medicine with food.    Discharge References/Attachments     GLENOHUMERAL OSTEOARTHRITIS, UNDERSTANDING (ENGLISH)    OSTEOARTHRITIS (ENGLISH)      24 Hour Appointment Hotline       To make an appointment at any [place] clinic, call [phone] ([phone]). If you don't have a family doctor or clinic, we will help you find one. [place] clinics are conveniently located to serve the needs of you and your family.             Review of your medicines      Our records show that you are taking the medicines listed below. If these are incorrect, please call your family doctor or clinic.        Dose / Directions Last dose taken    ARIPiprazole 20 MG tablet   Commonly known as:  ABILIFY   Dose:  25 mg        Take 25 mg by mouth every morning   Refills:  0        buPROPion 300 MG 24 hr tablet   Commonly known as:  WELLBUTRIN XL   Dose:  300 mg   Quantity:  30 tablet        Take 1 tablet (300 mg) by mouth every morning   Refills:  1        divalproex 500 MG 24 hr tablet   Commonly known as:  DEPAKOTE ER   Dose:  1500 mg        Take 1,500 mg by mouth At Bedtime   Refills:  0        DULoxetine 60 MG EC capsule   Commonly known as:  CYMBALTA   Dose:  60 mg        Take 60 mg by mouth every morning  "  Refills:  0        LORazepam 0.5 MG tablet   Commonly known as:  ATIVAN   Dose:  1 mg        Take 1 mg by mouth At Bedtime Dose = 1-2 x 0.5 mg tablets   Refills:  0                Procedures and tests performed during your visit     Basic metabolic panel    CBC with platelets differential    CRP inflammation    Erythrocyte sedimentation rate auto    Rheumatoid factor    TSH with free T4 reflex    XR Shoulder Right G/E 3 Views      Orders Needing Specimen Collection     None      Pending Results     Date and Time Order Name Status Description    9/27/2017 1607 Rheumatoid factor In process     9/27/2017 1555 XR Shoulder Right G/E 3 Views Preliminary             Pending Culture Results     No orders found from 9/25/2017 to 9/28/2017.            Pending Results Instructions     If you had any lab results that were not finalized at the time of your Discharge, you can call the ED Lab Result RN at [phone]. You will be contacted by this team for any positive Lab results or changes in treatment. The nurses are available 7 days a week from 10A to 6:30P.  You can leave a message 24 hours per day and they will return your call.        Thank you for choosing [facility]       Thank you for choosing [facility] for your care. Our goal is always to provide you with excellent care. Hearing back from our patients is one way we can continue to improve our services. Please take a few minutes to complete the written survey that you may receive in the mail after you visit with us. Thank you!        [employer] Information     [employer] lets you send messages to your doctor, view your test results, renew your prescriptions, schedule appointments and more. To sign up, go to [email or web address] . Click on \"Log in\" on the left side of the screen, which will take you to the Welcome page. Then click on \"Sign up Now\" on the right side of the page.     You will be asked to enter the access code listed below, as well as some personal " information. Please follow the directions to create your username and password.     Your access code is: [email or web address]  Expires: 2017  5:27 PM     Your access code will  in 90 days. If you need help or a new code, please call your [place] clinic or [phone].        Care EveryWhere ID     This is your Care EveryWhere ID. This could be used by other organizations to access your [place] medical records  [id number]        Equal Access to Services     [facility] : Azul quintero Sokesha, waaxsusie luqadaha, qaybta kaalmada nohemy, sandra brooks . So [facility] [phone].    ATENCIÓN: Si habla español, tiene a smith disposición servicios gratuitos de asistencia lingüística. Llame al [phone].    We comply with applicable federal civil rights laws and [place] laws. We do not discriminate on the basis of race, color, national origin, age, disability sex, sexual orientation or gender identity.            After Visit Summary       This is your record. Keep this with you and show to your community pharmacist(s) and doctor(s) at your next visit.

## 2017-09-27 NOTE — DISCHARGE INSTRUCTIONS
Please make an appointment to follow up with Orthopedics (phone: [phone]) within 7 days.   Take ibuprofen 600 mg every 6 hours or Aleve 2 tablets twice a day for pain as needed.  Take this medicine with food.
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
## 2017-09-27 NOTE — ED AVS SNAPSHOT
[facility], [place], Emergency Department    [address] [place] [address]    Phone:  [phone]    Fax:  [phone]                                       Lynda Delgadillo   MRN: [id number]    Department:  [facility], Emergency Department   Date of Visit:  9/27/2017           After Visit Summary Signature Page     I have received my discharge instructions, and my questions have been answered. I have discussed any challenges I see with this plan with the nurse or doctor.    ..........................................................................................................................................  Patient/Patient Representative Signature      ..........................................................................................................................................  Patient Representative Print Name and Relationship to Patient    ..................................................               ................................................  Date                                            Time    ..........................................................................................................................................  Reviewed by Signature/Title    ...................................................              ..............................................  Date                                                            Time

## 2017-09-28 LAB — RHEUMATOID FACT SER NEPH-ACNC: <20 IU/ML (ref 0–20)

## 2018-01-14 ENCOUNTER — HOSPITAL ENCOUNTER (INPATIENT)
Facility: CLINIC | Age: 73
LOS: 2 days | Discharge: HOME HEALTH | DRG: 194 | End: 2018-01-17
Attending: EMERGENCY MEDICINE | Admitting: INTERNAL MEDICINE
Payer: COMMERCIAL

## 2018-01-14 ENCOUNTER — APPOINTMENT (OUTPATIENT)
Dept: GENERAL RADIOLOGY | Facility: CLINIC | Age: 73
DRG: 194 | End: 2018-01-14
Attending: EMERGENCY MEDICINE
Payer: COMMERCIAL

## 2018-01-14 DIAGNOSIS — M62.81 MUSCLE WEAKNESS (GENERALIZED): ICD-10-CM

## 2018-01-14 DIAGNOSIS — M62.81 GENERALIZED MUSCLE WEAKNESS: ICD-10-CM

## 2018-01-14 DIAGNOSIS — J18.9 PNEUMONIA OF RIGHT LOWER LOBE DUE TO INFECTIOUS ORGANISM: ICD-10-CM

## 2018-01-14 DIAGNOSIS — J18.8 OTHER PNEUMONIA, UNSPECIFIED ORGANISM: ICD-10-CM

## 2018-01-14 DIAGNOSIS — N39.3 STRESS INCONTINENCE OF URINE: ICD-10-CM

## 2018-01-14 DIAGNOSIS — F25.0 SCHIZOAFFECTIVE DISORDER, BIPOLAR TYPE (H): Primary | ICD-10-CM

## 2018-01-14 LAB
ALBUMIN SERPL-MCNC: 2.4 G/DL (ref 3.4–5)
ALBUMIN UR-MCNC: NEGATIVE MG/DL
ALP SERPL-CCNC: 58 U/L (ref 40–150)
ALT SERPL W P-5'-P-CCNC: 9 U/L (ref 0–50)
ANION GAP SERPL CALCULATED.3IONS-SCNC: 5 MMOL/L (ref 3–14)
APPEARANCE UR: CLEAR
AST SERPL W P-5'-P-CCNC: 7 U/L (ref 0–45)
BASOPHILS # BLD AUTO: 0 10E9/L (ref 0–0.2)
BASOPHILS NFR BLD AUTO: 0.3 %
BILIRUB SERPL-MCNC: 0.4 MG/DL (ref 0.2–1.3)
BILIRUB UR QL STRIP: NEGATIVE
BUN SERPL-MCNC: 18 MG/DL (ref 7–30)
CALCIUM SERPL-MCNC: 8.5 MG/DL (ref 8.5–10.1)
CHLORIDE SERPL-SCNC: 103 MMOL/L (ref 94–109)
CO2 SERPL-SCNC: 33 MMOL/L (ref 20–32)
COLOR UR AUTO: YELLOW
CREAT SERPL-MCNC: 0.73 MG/DL (ref 0.52–1.04)
CREAT SERPL-MCNC: 0.79 MG/DL (ref 0.52–1.04)
CRP SERPL-MCNC: 122 MG/L (ref 0–8)
D DIMER PPP FEU-MCNC: 0.5 UG/ML FEU (ref 0–0.5)
DIFFERENTIAL METHOD BLD: ABNORMAL
EOSINOPHIL # BLD AUTO: 0.2 10E9/L (ref 0–0.7)
EOSINOPHIL NFR BLD AUTO: 3.1 %
ERYTHROCYTE [DISTWIDTH] IN BLOOD BY AUTOMATED COUNT: 12.9 % (ref 10–15)
FLUAV+FLUBV AG SPEC QL: NEGATIVE
FLUAV+FLUBV AG SPEC QL: NEGATIVE
GFR SERPL CREATININE-BSD FRML MDRD: 72 ML/MIN/1.7M2
GFR SERPL CREATININE-BSD FRML MDRD: 78 ML/MIN/1.7M2
GLUCOSE SERPL-MCNC: 93 MG/DL (ref 70–99)
GLUCOSE UR STRIP-MCNC: NEGATIVE MG/DL
HCT VFR BLD AUTO: 36.6 % (ref 35–47)
HGB BLD-MCNC: 11.6 G/DL (ref 11.7–15.7)
HGB UR QL STRIP: NEGATIVE
IMM GRANULOCYTES # BLD: 0.2 10E9/L (ref 0–0.4)
IMM GRANULOCYTES NFR BLD: 2.4 %
KETONES UR STRIP-MCNC: NEGATIVE MG/DL
LEUKOCYTE ESTERASE UR QL STRIP: NEGATIVE
LYMPHOCYTES # BLD AUTO: 1.6 10E9/L (ref 0.8–5.3)
LYMPHOCYTES NFR BLD AUTO: 22.3 %
MCH RBC QN AUTO: 31.2 PG (ref 26.5–33)
MCHC RBC AUTO-ENTMCNC: 31.7 G/DL (ref 31.5–36.5)
MCV RBC AUTO: 98 FL (ref 78–100)
MONOCYTES # BLD AUTO: 0.9 10E9/L (ref 0–1.3)
MONOCYTES NFR BLD AUTO: 13 %
MUCOUS THREADS #/AREA URNS LPF: PRESENT /LPF
NEUTROPHILS # BLD AUTO: 4.2 10E9/L (ref 1.6–8.3)
NEUTROPHILS NFR BLD AUTO: 58.9 %
NITRATE UR QL: NEGATIVE
NRBC # BLD AUTO: 0 10*3/UL
NRBC BLD AUTO-RTO: 0 /100
NT-PROBNP SERPL-MCNC: 94 PG/ML (ref 0–900)
PH UR STRIP: 6 PH (ref 5–7)
PLATELET # BLD AUTO: 274 10E9/L (ref 150–450)
PLATELET # BLD AUTO: 298 10E9/L (ref 150–450)
POTASSIUM SERPL-SCNC: 4.1 MMOL/L (ref 3.4–5.3)
PROT SERPL-MCNC: 6.6 G/DL (ref 6.8–8.8)
RBC # BLD AUTO: 3.72 10E12/L (ref 3.8–5.2)
RBC #/AREA URNS AUTO: <1 /HPF (ref 0–2)
SODIUM SERPL-SCNC: 141 MMOL/L (ref 133–144)
SOURCE: ABNORMAL
SP GR UR STRIP: 1.01 (ref 1–1.03)
SPECIMEN SOURCE: NORMAL
SQUAMOUS #/AREA URNS AUTO: <1 /HPF (ref 0–1)
TRANS CELLS #/AREA URNS HPF: <1 /HPF (ref 0–1)
TROPONIN I SERPL-MCNC: <0.015 UG/L (ref 0–0.04)
UROBILINOGEN UR STRIP-MCNC: NORMAL MG/DL (ref 0–2)
VALPROATE SERPL-MCNC: 83 MG/L (ref 50–100)
WBC # BLD AUTO: 7.2 10E9/L (ref 4–11)
WBC #/AREA URNS AUTO: <1 /HPF (ref 0–2)

## 2018-01-14 PROCEDURE — 85049 AUTOMATED PLATELET COUNT: CPT | Performed by: NURSE PRACTITIONER

## 2018-01-14 PROCEDURE — 82565 ASSAY OF CREATININE: CPT | Performed by: NURSE PRACTITIONER

## 2018-01-14 PROCEDURE — 87633 RESP VIRUS 12-25 TARGETS: CPT | Performed by: NURSE PRACTITIONER

## 2018-01-14 PROCEDURE — 99285 EMERGENCY DEPT VISIT HI MDM: CPT | Mod: 25 | Performed by: EMERGENCY MEDICINE

## 2018-01-14 PROCEDURE — 81001 URINALYSIS AUTO W/SCOPE: CPT | Performed by: EMERGENCY MEDICINE

## 2018-01-14 PROCEDURE — 87804 INFLUENZA ASSAY W/OPTIC: CPT | Performed by: INTERNAL MEDICINE

## 2018-01-14 PROCEDURE — 99223 1ST HOSP IP/OBS HIGH 75: CPT | Mod: AI | Performed by: INTERNAL MEDICINE

## 2018-01-14 PROCEDURE — 87040 BLOOD CULTURE FOR BACTERIA: CPT | Performed by: EMERGENCY MEDICINE

## 2018-01-14 PROCEDURE — 36415 COLL VENOUS BLD VENIPUNCTURE: CPT | Performed by: NURSE PRACTITIONER

## 2018-01-14 PROCEDURE — 96375 TX/PRO/DX INJ NEW DRUG ADDON: CPT | Performed by: EMERGENCY MEDICINE

## 2018-01-14 PROCEDURE — 85379 FIBRIN DEGRADATION QUANT: CPT | Performed by: EMERGENCY MEDICINE

## 2018-01-14 PROCEDURE — 25000128 H RX IP 250 OP 636: Performed by: NURSE PRACTITIONER

## 2018-01-14 PROCEDURE — 96361 HYDRATE IV INFUSION ADD-ON: CPT | Performed by: EMERGENCY MEDICINE

## 2018-01-14 PROCEDURE — 96365 THER/PROPH/DIAG IV INF INIT: CPT | Performed by: EMERGENCY MEDICINE

## 2018-01-14 PROCEDURE — 25000132 ZZH RX MED GY IP 250 OP 250 PS 637: Performed by: NURSE PRACTITIONER

## 2018-01-14 PROCEDURE — 85025 COMPLETE CBC W/AUTO DIFF WBC: CPT | Performed by: EMERGENCY MEDICINE

## 2018-01-14 PROCEDURE — 80053 COMPREHEN METABOLIC PANEL: CPT | Performed by: EMERGENCY MEDICINE

## 2018-01-14 PROCEDURE — 80164 ASSAY DIPROPYLACETIC ACD TOT: CPT | Performed by: EMERGENCY MEDICINE

## 2018-01-14 PROCEDURE — 84484 ASSAY OF TROPONIN QUANT: CPT | Performed by: EMERGENCY MEDICINE

## 2018-01-14 PROCEDURE — 93010 ELECTROCARDIOGRAM REPORT: CPT | Mod: Z6 | Performed by: EMERGENCY MEDICINE

## 2018-01-14 PROCEDURE — 71046 X-RAY EXAM CHEST 2 VIEWS: CPT

## 2018-01-14 PROCEDURE — 99207 ZZC APP CREDIT; MD BILLING SHARED VISIT: CPT | Performed by: NURSE PRACTITIONER

## 2018-01-14 PROCEDURE — 86140 C-REACTIVE PROTEIN: CPT | Performed by: EMERGENCY MEDICINE

## 2018-01-14 PROCEDURE — 83880 ASSAY OF NATRIURETIC PEPTIDE: CPT | Performed by: EMERGENCY MEDICINE

## 2018-01-14 PROCEDURE — 25000128 H RX IP 250 OP 636: Performed by: EMERGENCY MEDICINE

## 2018-01-14 RX ORDER — DIVALPROEX SODIUM 500 MG/1
1500 TABLET, EXTENDED RELEASE ORAL AT BEDTIME
Status: DISCONTINUED | OUTPATIENT
Start: 2018-01-14 | End: 2018-01-17 | Stop reason: HOSPADM

## 2018-01-14 RX ORDER — DULOXETIN HYDROCHLORIDE 60 MG/1
60 CAPSULE, DELAYED RELEASE ORAL EVERY MORNING
Status: DISCONTINUED | OUTPATIENT
Start: 2018-01-15 | End: 2018-01-17 | Stop reason: HOSPADM

## 2018-01-14 RX ORDER — ONDANSETRON 2 MG/ML
4 INJECTION INTRAMUSCULAR; INTRAVENOUS EVERY 6 HOURS PRN
Status: DISCONTINUED | OUTPATIENT
Start: 2018-01-14 | End: 2018-01-17 | Stop reason: HOSPADM

## 2018-01-14 RX ORDER — IPRATROPIUM BROMIDE AND ALBUTEROL SULFATE 2.5; .5 MG/3ML; MG/3ML
3 SOLUTION RESPIRATORY (INHALATION) 4 TIMES DAILY
Status: DISCONTINUED | OUTPATIENT
Start: 2018-01-14 | End: 2018-01-17

## 2018-01-14 RX ORDER — DIVALPROEX SODIUM 500 MG/1
1500 TABLET, EXTENDED RELEASE ORAL AT BEDTIME
COMMUNITY
Start: 2017-11-30

## 2018-01-14 RX ORDER — MAGNESIUM CARB/ALUMINUM HYDROX 105-160MG
148 TABLET,CHEWABLE ORAL
Status: DISCONTINUED | OUTPATIENT
Start: 2018-01-14 | End: 2018-01-17 | Stop reason: HOSPADM

## 2018-01-14 RX ORDER — LORAZEPAM 1 MG/1
1 TABLET ORAL AT BEDTIME
Status: DISCONTINUED | OUTPATIENT
Start: 2018-01-14 | End: 2018-01-17 | Stop reason: HOSPADM

## 2018-01-14 RX ORDER — DULOXETIN HYDROCHLORIDE 60 MG/1
60 CAPSULE, DELAYED RELEASE ORAL DAILY
COMMUNITY
Start: 2017-11-30

## 2018-01-14 RX ORDER — NALOXONE HYDROCHLORIDE 0.4 MG/ML
.1-.4 INJECTION, SOLUTION INTRAMUSCULAR; INTRAVENOUS; SUBCUTANEOUS
Status: DISCONTINUED | OUTPATIENT
Start: 2018-01-14 | End: 2018-01-17 | Stop reason: HOSPADM

## 2018-01-14 RX ORDER — GUAIFENESIN/DEXTROMETHORPHAN 100-10MG/5
10 SYRUP ORAL EVERY 4 HOURS PRN
Status: CANCELLED | OUTPATIENT
Start: 2018-01-14

## 2018-01-14 RX ORDER — ARIPIPRAZOLE 20 MG/1
20 TABLET ORAL EVERY MORNING
Status: ON HOLD | COMMUNITY
Start: 2017-11-30 | End: 2018-01-17

## 2018-01-14 RX ORDER — SODIUM CHLORIDE 9 MG/ML
INJECTION, SOLUTION INTRAVENOUS CONTINUOUS
Status: DISCONTINUED | OUTPATIENT
Start: 2018-01-14 | End: 2018-01-16

## 2018-01-14 RX ORDER — BUPROPION HYDROCHLORIDE 150 MG/1
150 TABLET ORAL EVERY MORNING
COMMUNITY
Start: 2017-11-30

## 2018-01-14 RX ORDER — BUPROPION HYDROCHLORIDE 300 MG/1
300 TABLET ORAL EVERY MORNING
Status: ON HOLD | COMMUNITY
Start: 2017-11-30 | End: 2018-01-29

## 2018-01-14 RX ORDER — POLYETHYLENE GLYCOL 3350 17 G/17G
17 POWDER, FOR SOLUTION ORAL DAILY PRN
Status: DISCONTINUED | OUTPATIENT
Start: 2018-01-14 | End: 2018-01-17 | Stop reason: HOSPADM

## 2018-01-14 RX ORDER — LORAZEPAM 0.5 MG/1
1 TABLET ORAL AT BEDTIME
COMMUNITY
Start: 2018-01-09

## 2018-01-14 RX ORDER — BISACODYL 5 MG
10 TABLET, DELAYED RELEASE (ENTERIC COATED) ORAL DAILY PRN
Status: DISCONTINUED | OUTPATIENT
Start: 2018-01-14 | End: 2018-01-17 | Stop reason: HOSPADM

## 2018-01-14 RX ORDER — BISACODYL 5 MG
15 TABLET, DELAYED RELEASE (ENTERIC COATED) ORAL DAILY PRN
Status: DISCONTINUED | OUTPATIENT
Start: 2018-01-14 | End: 2018-01-17 | Stop reason: HOSPADM

## 2018-01-14 RX ORDER — ONDANSETRON 4 MG/1
4 TABLET, ORALLY DISINTEGRATING ORAL EVERY 6 HOURS PRN
Status: DISCONTINUED | OUTPATIENT
Start: 2018-01-14 | End: 2018-01-17 | Stop reason: HOSPADM

## 2018-01-14 RX ORDER — DEXTROMETHORPHAN POLISTIREX 30 MG/5ML
30 SUSPENSION ORAL
Status: DISCONTINUED | OUTPATIENT
Start: 2018-01-14 | End: 2018-01-17 | Stop reason: HOSPADM

## 2018-01-14 RX ORDER — BISACODYL 5 MG
5 TABLET, DELAYED RELEASE (ENTERIC COATED) ORAL DAILY PRN
Status: DISCONTINUED | OUTPATIENT
Start: 2018-01-14 | End: 2018-01-17 | Stop reason: HOSPADM

## 2018-01-14 RX ORDER — DOCUSATE SODIUM 100 MG/1
100 CAPSULE, LIQUID FILLED ORAL 2 TIMES DAILY
Status: DISCONTINUED | OUTPATIENT
Start: 2018-01-14 | End: 2018-01-17 | Stop reason: HOSPADM

## 2018-01-14 RX ORDER — GUAIFENESIN 600 MG/1
600 TABLET, EXTENDED RELEASE ORAL 2 TIMES DAILY
Status: DISCONTINUED | OUTPATIENT
Start: 2018-01-14 | End: 2018-01-17 | Stop reason: HOSPADM

## 2018-01-14 RX ORDER — TOLTERODINE 2 MG/1
2 CAPSULE, EXTENDED RELEASE ORAL DAILY
Status: DISCONTINUED | OUTPATIENT
Start: 2018-01-14 | End: 2018-01-17 | Stop reason: HOSPADM

## 2018-01-14 RX ORDER — ACETAMINOPHEN 325 MG/1
650 TABLET ORAL EVERY 4 HOURS PRN
Status: DISCONTINUED | OUTPATIENT
Start: 2018-01-14 | End: 2018-01-17 | Stop reason: HOSPADM

## 2018-01-14 RX ADMIN — SODIUM CHLORIDE: 9 INJECTION, SOLUTION INTRAVENOUS at 14:47

## 2018-01-14 RX ADMIN — AZITHROMYCIN MONOHYDRATE 500 MG: 500 INJECTION, POWDER, LYOPHILIZED, FOR SOLUTION INTRAVENOUS at 11:00

## 2018-01-14 RX ADMIN — CEFTRIAXONE 2 G: 2 INJECTION, POWDER, FOR SOLUTION INTRAMUSCULAR; INTRAVENOUS at 14:48

## 2018-01-14 RX ADMIN — SODIUM CHLORIDE, POTASSIUM CHLORIDE, SODIUM LACTATE AND CALCIUM CHLORIDE 1000 ML: 600; 310; 30; 20 INJECTION, SOLUTION INTRAVENOUS at 10:04

## 2018-01-14 RX ADMIN — ENOXAPARIN SODIUM 40 MG: 40 INJECTION SUBCUTANEOUS at 15:01

## 2018-01-14 RX ADMIN — DIVALPROEX SODIUM 1500 MG: 500 TABLET, EXTENDED RELEASE ORAL at 20:30

## 2018-01-14 RX ADMIN — AZITHROMYCIN MONOHYDRATE 500 MG: 500 INJECTION, POWDER, LYOPHILIZED, FOR SOLUTION INTRAVENOUS at 14:57

## 2018-01-14 RX ADMIN — LORAZEPAM 1 MG: 1 TABLET ORAL at 20:31

## 2018-01-14 RX ADMIN — CEFTRIAXONE 2 G: 2 INJECTION, POWDER, FOR SOLUTION INTRAMUSCULAR; INTRAVENOUS at 10:52

## 2018-01-14 ASSESSMENT — ENCOUNTER SYMPTOMS
BLOOD IN STOOL: 0
VOMITING: 0
NEUROLOGIC COMPLAINT: 1
COLOR CHANGE: 0
SPEECH DIFFICULTY: 0
WEAKNESS: 1
FREQUENCY: 0
TREMORS: 0
ARTHRALGIAS: 0
DIFFICULTY URINATING: 0
COUGH: 1
FATIGUE: 1
SHORTNESS OF BREATH: 1
APPETITE CHANGE: 0
TROUBLE SWALLOWING: 0
HYPERACTIVE: 0
DYSPHORIC MOOD: 0
CHEST TIGHTNESS: 0
WHEEZING: 0
UNEXPECTED WEIGHT CHANGE: 1
EYE REDNESS: 0
HEADACHES: 0
FEVER: 0
ACTIVITY CHANGE: 1
STRIDOR: 0
NERVOUS/ANXIOUS: 0
SORE THROAT: 1
NECK STIFFNESS: 0
APNEA: 0
CHILLS: 0
PALPITATIONS: 0
DIZZINESS: 0
DYSURIA: 0
CONSTIPATION: 0
LIGHT-HEADEDNESS: 0
DIAPHORESIS: 0
RHINORRHEA: 1
ABDOMINAL PAIN: 0
SLEEP DISTURBANCE: 0
DIARRHEA: 0
HALLUCINATIONS: 0
NAUSEA: 0
SEIZURES: 0
CONFUSION: 0

## 2018-01-14 NOTE — IP AVS SNAPSHOT
MRN:[id number]                      After Visit Summary   1/14/2018    Lynda Delgadillo    MRN: [id number]           Thank you!     Thank you for choosing [facility] for your care. Our goal is always to provide you with excellent care. Hearing back from our patients is one way we can continue to improve our services. Please take a few minutes to complete the written survey that you may receive in the mail after you visit with us. Thank you!        Patient Information     Date Of Birth          1945        Designated Caregiver       Most Recent Value    Caregiver    Will someone help with your care after discharge? no [needs help]      About your hospital stay     You were admitted on:  January 15, 2018 You last received care in the:  [facility]    You were discharged on:  January 17, 2018        Reason for your hospital stay       Pneumonia                  Who to Call     For medical emergencies, please call 911.  For non-urgent questions about your medical care, please call your primary care provider or clinic, [phone]          Attending Provider     Provider Specialty    Chilo Vasquez MD Emergency Medicine    [facility]       Primary Care Provider Office Phone # Fax #    Owen Jurado [phone]       When to contact your care team       Please seek mediacl attention if you develop more cough, fever or chills                  After Care Instructions     Activity       Your activity upon discharge: activity as tolerated            Diet       Follow this diet upon discharge: Orders Placed This Encounter      Snacks/Supplements Adult: Ensure Clear; Between Meals      Snacks/Supplements Adult: Ensure Plus Adult Shake; Between Meals      Regular Diet Adult                  Follow-up Appointments     Adult [facility] Follow-up and recommended labs and tests       Please follow up with your PCP as a follow up visit in 1-2 weeks  "  Appointments on [place] and/or [facility] (with [facility] or [facility] provider or service). Call [phone] if you haven't heard regarding these appointments within 7 days of discharge.                  Additional Services     Home Care OT Referral for Hospital Discharge       Occupational Therapy- Evaluate and Treat.            Home care nursing referral       [facility]  Phone  [phone]  Fax  [phone]  ______________________    RN skilled nursing visit. RN to assess vital signs and weight, respiratory and cardiac status, hydration, nutrition and bowel status and home safety.  RN to teach medication management.    Home health aide to assist with bathing and personal hygiene cares                  Pending Results     Date and Time Order Name Status Description    1/14/2018 1353 Sputum Culture Aerobic Bacterial Preliminary     1/14/2018 1114 Blood culture ONE site Preliminary             Statement of Approval     Ordered          01/17/18 1020  I have reviewed and agree with all the recommendations and orders detailed in this document.  EFFECTIVE NOW     Approved and electronically signed by:  Thomas Adams MD             Admission Information     Date & Time Provider Department Dept. Phone    1/14/2018 Bang Watkins MD [facility] [phone]      Your Vitals Were     Blood Pressure Pulse Temperature Respirations Weight Pulse Oximetry    133/83 (BP Location: Right arm) 87 96.3  F (35.7  C) (Oral) 16 118.5 kg (261 lb 3.2 oz) 96%    BMI (Body Mass Index)                   39.72 kg/m2           My[facility] Information     [facility] lets you send messages to your doctor, view your test results, renew your prescriptions, schedule appointments and more. To sign up, go to [email or web address] . Click on \"Log in\" on the left side of the screen, which will take you to the Welcome page. Then click on \"Sign up Now\" on the right side of the page.     You will be asked to enter the access " code listed below, as well as some personal information. Please follow the directions to create your username and password.     Your access code is: [email or web address]  Expires: 2018  9:13 AM     Your access code will  in 90 days. If you need help or a new code, please call your [place] clinic or [phone].        Care EveryWhere ID     This is your Care EveryWhere ID. This could be used by other organizations to access your [place] medical records  [id number]        Equal Access to Services     [facility]: Hadii keerthi arora hadaimee Sokesha, waaxda luqadaha, qaybta kaalmada ismaelyasusie, sandra brooks . So [facility] [phone].    ATENCIÓN: Si habla español, tiene a smith disposición servicios gratuitos de asistencia lingüística. Llame al [phone].    We comply with applicable federal civil rights laws and [place] laws. We do not discriminate on the basis of race, color, national origin, age, disability, sex, sexual orientation, or gender identity.               Review of your medicines      START taking        Dose / Directions    cefdinir 300 MG capsule   Commonly known as:  OMNICEF   Indication:  Community Acquired Pneumonia   Used for:  Pneumonia of right lower lobe due to infectious organism (H)        Dose:  300 mg   Take 1 capsule (300 mg) by mouth every 12 hours   Quantity:  8 capsule   Refills:  0       guaiFENesin 600 MG 12 hr tablet   Commonly known as:  MUCINEX   Used for:  Pneumonia of right lower lobe due to infectious organism (H)        Dose:  600 mg   Take 1 tablet (600 mg) by mouth 2 times daily   Quantity:  28 tablet   Refills:  0       tolterodine 2 MG 24 hr capsule   Commonly known as:  DETROL LA   Used for:  Stress incontinence of urine        Dose:  2 mg   Start taking on:  2018   Take 1 capsule (2 mg) by mouth daily   Quantity:  30 capsule   Refills:  0         CONTINUE these medicines which may have CHANGED, or have new prescriptions. If we are  uncertain of the size of tablets/capsules you have at home, strength may be listed as something that might have changed.        Dose / Directions    ARIPiprazole 5 MG tablet   Commonly known as:  ABILIFY   This may have changed:    - medication strength  - Another medication with the same name was removed. Continue taking this medication, and follow the directions you see here.   Used for:  Schizoaffective disorder, bipolar type (H)        Dose:  25 mg   Start taking on:  1/18/2018   Take 5 tablets (25 mg) by mouth every morning   Quantity:  30 tablet   Refills:  1         CONTINUE these medicines which have NOT CHANGED        Dose / Directions    * buPROPion 150 MG 24 hr tablet   Commonly known as:  WELLBUTRIN XL        Dose:  150 mg   Take 150 mg by mouth every morning   Refills:  0       * buPROPion 300 MG 24 hr tablet   Commonly known as:  WELLBUTRIN XL        Dose:  300 mg   Take 300 mg by mouth every morning   Refills:  0       divalproex 500 MG 24 hr tablet   Commonly known as:  DEPAKOTE ER        Dose:  1500 mg   Take 1,500 mg by mouth At Bedtime   Refills:  0       DULoxetine 60 MG EC capsule   Commonly known as:  CYMBALTA        Dose:  60 mg   Take 60 mg by mouth daily   Refills:  0       LORazepam 0.5 MG tablet   Commonly known as:  ATIVAN        Dose:  1 mg   Take 1 mg by mouth At Bedtime   Refills:  0       * Notice:  This list has 2 medication(s) that are the same as other medications prescribed for you. Read the directions carefully, and ask your doctor or other care provider to review them with you.         Where to get your medicines      These medications were sent to [facility] [place], [place] - [address]  [address] [address]     Phone:  [phone]     cefdinir 300 MG capsule    guaiFENesin 600 MG 12 hr tablet         Some of these will need a paper prescription and others can be bought over the counter. Ask your nurse if you have questions.      You don't need a prescription for these medications     ARIPiprazole 5 MG tablet    tolterodine 2 MG 24 hr capsule               ANTIBIOTIC INSTRUCTION     You've Been Prescribed an Antibiotic - Now What?  Your healthcare team thinks that you or your loved one might have an infection. Some infections can be treated with antibiotics, which are powerful, life-saving drugs. Like all medications, antibiotics have side effects and should only be used when necessary. There are some important things you should know about your antibiotic treatment.      Your healthcare team may run tests before you start taking an antibiotic.    Your team may take samples (e.g., from your blood, urine or other areas) to run tests to look for bacteria. These test can be important to determine if you need an antibiotic at all and, if you do, which antibiotic will work best.      Within a few days, your healthcare team might change or even stop your antibiotic.    Your team may start you on an antibiotic while they are working to find out what is making you sick.    Your team might change your antibiotic because test results show that a different antibiotic would be better to treat your infection.    In some cases, once your team has more information, they learn that you do not need an antibiotic at all. They may find out that you don't have an infection, or that the antibiotic you're taking won't work against your infection. For example, an infection caused by a virus can't be treated with antibiotics. Staying on an antibiotic when you don't need it is more likely to be harmful than helpful.      You may experience side effects from your antibiotic.    Like all medications, antibiotics have side effects. Some of these can be serious.    Let you healthcare team know if you have any known allergies when you are admitted to the hospital.    One significant side effect of nearly all antibiotics is the risk of severe and sometimes deadly diarrhea  caused by Clostridium difficile (C. Difficile). This occurs when a person takes antibiotics because some good germs are destroyed. Antibiotic use allows C. diificile to take over, putting patients at high risk for this serious infection.    As a patient or caregiver, it is important to understand your or your loved one's antibiotic treatment. It is especially important for caregivers to speak up when patients can't speak for themselves. Here are some important questions to ask your healthcare team.    What infection is this antibiotic treating and how do you know I have that infection?    What side effects might occur from this antibiotic?    How long will I need to take this antibiotic?    Is it safe to take this antibiotic with other medications or supplements (e.g., vitamins) that I am taking?     Are there any special directions I need to know about taking this antibiotic? For example, should I take it with food?    How will I be monitored to know whether my infection is responding to the antibiotic?    What tests may help to make sure the right antibiotic is prescribed for me?      Information provided by:  www.cdc.gov/getsmart  U.S. Department of Health and Human Services  Centers for disease Control and Prevention  National Center for Emerging and Zoonotic Infectious Diseases  Division of Healthcare Quality Promotion         Protect others around you: Learn how to safely use, store and throw away your medicines at www.disposemymeds.org.             Medication List: This is a list of all your medications and when to take them. Check marks below indicate your daily home schedule. Keep this list as a reference.      Medications           Morning Afternoon Evening Bedtime As Needed    ARIPiprazole 5 MG tablet   Commonly known as:  ABILIFY   Take 5 tablets (25 mg) by mouth every morning   Start taking on:  1/18/2018   Last time this was given:  25 mg on 1/17/2018  8:19 AM   Next Dose Due:  1/18/2018 8:00AM             8AM                       * buPROPion 150 MG 24 hr tablet   Commonly known as:  WELLBUTRIN XL   Take 150 mg by mouth every morning   Last time this was given:  450 mg on 1/17/2018  8:19 AM   Next Dose Due:  1/18/2018 8AM            8AM                       * buPROPion 300 MG 24 hr tablet   Commonly known as:  WELLBUTRIN XL   Take 300 mg by mouth every morning   Last time this was given:  450 mg on 1/17/2018  8:19 AM   Next Dose Due:  1/18/2018 8AM            8AM                       cefdinir 300 MG capsule   Commonly known as:  OMNICEF   Take 1 capsule (300 mg) by mouth every 12 hours   Last time this was given:  300 mg on 1/17/2018  8:25 AM   Next Dose Due:  1/17/2018 8PM            8AM           8PM               divalproex 500 MG 24 hr tablet   Commonly known as:  DEPAKOTE ER   Take 1,500 mg by mouth At Bedtime   Last time this was given:  1,500 mg on 1/16/2018  9:06 PM   Next Dose Due:  1/17/2018 10PM                        10PM           DULoxetine 60 MG EC capsule   Commonly known as:  CYMBALTA   Take 60 mg by mouth daily   Last time this was given:  60 mg on 1/17/2018  8:19 AM   Next Dose Due:  1/18/2018 8AM            8AM                       guaiFENesin 600 MG 12 hr tablet   Commonly known as:  MUCINEX   Take 1 tablet (600 mg) by mouth 2 times daily   Last time this was given:  600 mg on 1/17/2018  8:19 AM   Next Dose Due:  1/17/2018 8PM            8AM           8PM               LORazepam 0.5 MG tablet   Commonly known as:  ATIVAN   Take 1 mg by mouth At Bedtime   Last time this was given:  1 mg on 1/16/2018 10:01 PM   Next Dose Due:  1/17/2018 10PM                        10PM           tolterodine 2 MG 24 hr capsule   Commonly known as:  DETROL LA   Take 1 capsule (2 mg) by mouth daily   Start taking on:  1/18/2018   Last time this was given:  2 mg on 1/17/2018  8:19 AM   Next Dose Due:  1/18/2018 8AM            8AM                       * Notice:  This list has 2 medication(s) that are the same as  other medications prescribed for you. Read the directions carefully, and ask your doctor or other care provider to review them with you.

## 2018-01-14 NOTE — PHARMACY-ADMISSION MEDICATION HISTORY
Admission Medication History status for the 1/14/2018 admission is complete.  See EPIC admission navigator for Prior to Admission medications.    Medication history sources:  Patient, [facility] Everywhere, Nini Holley - [facility] ([phone])      Medication history source reliability: Good. Patient recognized all medication names and could describe how she was taking them. Patient was confused on duloxetine dose so all doses and medications including formulations were verified with [facility] Everywhere records and [facility].    Medication adherence:  Good. Patient reports no difficulty taking medications daily. Per pharmacy, patient filling medications regularly indicating adherence.    Changes made to PTA medication list (reason)  Added:   -bupropion 150mg XL - per patient/[facility] Care Everywhere.  Deleted: None  Changed:   -aripiprazole 20mg: take 25mg by mouth every morning --> aripiprazole 20mg: take 20mg by mouth every morning per patient/[facility] Everywhere.    Additional medication history information (including reliability of information, actions taken by pharmacist):   -Patient reports she did not take any doses this morning (1/14/17) because EMS told her not to. Patient takes aripiprazole 20mg, bupropion 450mg, and duloxetine 60mg in the morning  -Lorazepam mailed to patient on 12/26/17 for 60 tabs per Nini Holley.   -Aripiprazole, bupropion, divalproex, and duloxetine filled January 2018 per Nini Holley.      Time spent in this activity: 20 minutes    Medication history completed by: Gloria Hardy, [facility] Pharmacy Intern    Prior to Admission medications    Medication Sig Last Dose Taking? Auth Provider   ARIPiprazole (ABILIFY) 20 MG tablet Take 20 mg by mouth every morning 1/13/2018 at AM Yes Unknown, Entered By History   buPROPion (WELLBUTRIN XL) 150 MG 24 hr tablet Take 150 mg by mouth every morning 1/13/2018 at AM Yes Unknown, Entered By  History   buPROPion (WELLBUTRIN XL) 300 MG 24 hr tablet Take 300 mg by mouth every morning 1/13/2018 at AM Yes Unknown, Entered By History   divalproex (DEPAKOTE ER) 500 MG 24 hr tablet Take 1,500 mg by mouth At Bedtime 1/13/2018 at Bedtime Yes Unknown, Entered By History   DULoxetine (CYMBALTA) 60 MG EC capsule Take 60 mg by mouth daily 1/13/2018 at AM Yes Unknown, Entered By History   LORazepam (ATIVAN) 0.5 MG tablet Take 1 mg by mouth At Bedtime 1/13/2018 at Bedtime Yes Unknown, Entered By History

## 2018-01-14 NOTE — ED NOTES
[facility], [place]   ED Nurse to Floor Handoff     Lynda Delgadillo is a 72 year old female who speaks English and lives alone,  in a home (apartment)  They arrived in the ED by car(Ambulance*) from emergency room    ED Chief Complaint: Generalized Weakness (Pt lives in an apt by herself and states she is not ill but is having trouble getting around for the past week.  Has been taking all her medications as Rx.)    ED Dx;   Final diagnoses:   Pneumonia of right lower lobe due to infectious organism (H)   Generalized muscle weakness         Needed?: No    Allergies:   Allergies   Allergen Reactions     Dilaudid Cough Other (See Comments)     Psychosis and agitation     Haldol Swelling     Tongue swelling     Lactose GI Disturbance     Morphine      I have a anger reaction.     Neurontin [Gabapentin] Unknown     Patient states she just refuses to take it after reading side effect profile     Penicillins Hives   .  Past Medical Hx:   Past Medical History:   Diagnosis Date     Arthritis     right neck  pain radiating down to numb fingers     Bipolar disorder (H)      LOC (loss of consciousness) (H) age 24    tripped down outside stairs and head hit concrete     Lymphedema age 64    both ankles and feet      Baseline Mental status: WDL  Current Mental Status changes: at basesline    Infection: Yes  Sepsis suspected: No  Isolation type: No active isolations     Activity level - Baseline/Home:  Independent  Activity Level - Current:   Stand with Assist (DAVID accurately d/t weakness, start with A2 at EOB)    Bariatric equipment needed?: Yes    In the ED these meds were given:   Medications   lactated ringers BOLUS 2,000 mL (0 mLs Intravenous Stopped 1/14/18 1109)   cefTRIAXone (ROCEPHIN) 2 g in 20 mL SWFI Premix Syringe (2 g Intravenous Given 1/14/18 1052)   azithromycin (ZITHROMAX) 500 mg in NaCl 0.9 % 250 mL intermittent infusion (0 mg Intravenous Stopped 1/14/18 1212)       Drips  running?  Yes    Home pump or pre-existing LDA's present? No    Labs results:   Labs Ordered and Resulted from Time of ED Arrival Up to the Time of Departure from the ED   CBC WITH PLATELETS DIFFERENTIAL - Abnormal; Notable for the following:        Result Value    RBC Count 3.72 (*)     Hemoglobin 11.6 (*)     All other components within normal limits   COMPREHENSIVE METABOLIC PANEL - Abnormal; Notable for the following:     Carbon Dioxide 33 (*)     Albumin 2.4 (*)     Protein Total 6.6 (*)     All other components within normal limits   CRP INFLAMMATION - Abnormal; Notable for the following:     CRP Inflammation 122.0 (*)     All other components within normal limits   ROUTINE UA WITH MICROSCOPIC REFLEX TO CULTURE - Abnormal; Notable for the following:     Mucous Urine Present (*)     All other components within normal limits   TROPONIN I   NT PROBNP INPATIENT   VALPROIC ACID   D DIMER QUANTITATIVE   MEASURE WEIGHT   ORTHOSTATIC BLOOD PRESSURE AND PULSE   BLOOD CULTURE   INFLUENZA A/B ANTIGEN       Imaging Studies:   Recent Results (from the past 24 hour(s))   XR Chest 2 Views    Narrative    CHEST TWO VIEWS January 14, 2018 9:54 AM     HISTORY: Productive cough for two weeks.    COMPARISON: 2/12/2017.      Impression    IMPRESSION: New patchy airspace opacities in the right lung concerning  for pneumonia. Small right pleural effusion. Left lung is relatively  clear. No pneumothorax. Cardiac silhouette within normal limits. Mild  degenerative changes in the spine with anterior osteophytic spurring.    AFIA ESPARZA MD       Recent vital signs:   /62  Pulse 86  Temp 99  F (37.2  C) (Oral)  Resp 18  Wt 107.1 kg (236 lb 1 oz)  SpO2 92%  Breastfeeding? No  BMI 35.89 kg/m2    Cardiac Rhythm: Normal Sinus  Pt needs tele? Yes  Skin/wound Issues: None    Code Status: Full Code    Pain control: pt had none    Nausea control: pt had none    Abnormal labs/tests/findings requiring intervention: please look in  results review    Family present during ED course? No   Family Comments/Social Situation comments: Pt reports not being able to currently care for self at home, unable to get OOB in ED d/t weakness, baseline uses a walker in apartment    Tasks needing completion: None    Ina Leo RN-- KEENAN jackson-- 0  [phone] [place] ED  [phone] [facility] ED

## 2018-01-14 NOTE — ED NOTES
Bed: ED03  Expected date: 1/14/18  Expected time:   Means of arrival:   Comments:  H434 72F Weakness

## 2018-01-14 NOTE — ED PROVIDER NOTES
History     Chief Complaint   Patient presents with     Generalized Weakness     Pt lives in an apt by herself and states she is not ill but is having trouble getting around for the past week.  Has been taking all her medications as Rx.     Patient is a 72 year old female presenting with neurologic complaint.   Neurologic Problem   This is a new problem. The current episode started more than 2 days ago. The problem occurs constantly. The problem has been gradually worsening. Associated symptoms include shortness of breath (with exertion). Pertinent negatives include no chest pain, no abdominal pain and no headaches. The symptoms are aggravated by eating (not eating well). She has tried nothing for the symptoms. The treatment provided no relief.     Lynda Delgadillo is a 72 year old female with a history of bipolar disorder, arthritis status post knee replacement and lymphedema who presents for evaluation of generalized weakness.  Patient states that she lives independently and has an ILS worker to maintain her independence.  She states that over the past several weeks to months she has had progressive weakness which she attributes to not eating well and not moving around as much as she did in the fall and summer.  She states that 4 days ago she woke up on the floor and secondary to her knee replacement, was unable to get up onto her bed.  She called paramedics who assisted her back into bed.  She states that she was able to ambulate 2 days ago with her ILS worker but was limited by shortness of breath.  She notes progressive shortness of breath over the past several weeks associated with cough that is intermittently productive of yellow sputum.  She notes that she was mostly in bed yesterday and did not eat despite her ILS worker offering to assist her.  She notes that today, she was barely able to sit up in bed and then was not able to stand secondary to overall weakness.  She denies specifically focal weakness  or any other current symptoms other than those listed below.  Patient states that in her current physical state, she is unable to care for herself.  She thinks that she needs to be either in the hospital or a rehab center.  I have reviewed the Medications, Allergies, Past Medical and Surgical History, and Social History in the Epic system.    Review of Systems   Constitutional: Positive for activity change, fatigue and unexpected weight change (5 pound weight loss). Negative for appetite change, chills, diaphoresis and fever.   HENT: Positive for postnasal drip, rhinorrhea and sore throat (few days ago - not now). Negative for congestion, ear discharge, tinnitus and trouble swallowing.    Eyes: Negative for redness.   Respiratory: Positive for cough (several weeks ago) and shortness of breath (with exertion). Negative for apnea, chest tightness, wheezing and stridor.    Cardiovascular: Positive for leg swelling ( for years). Negative for chest pain and palpitations.   Gastrointestinal: Negative for abdominal pain, blood in stool, constipation, diarrhea, nausea and vomiting.   Genitourinary: Negative for difficulty urinating, dysuria, frequency, urgency and vaginal discharge.   Musculoskeletal: Negative for arthralgias and neck stiffness.   Skin: Negative for color change.   Neurological: Positive for weakness. Negative for dizziness, tremors, seizures, syncope, speech difficulty, light-headedness and headaches.   Psychiatric/Behavioral: Negative for behavioral problems, confusion, dysphoric mood, hallucinations, self-injury, sleep disturbance and suicidal ideas. The patient is not nervous/anxious and is not hyperactive.        Physical Exam   BP: 93/65  Pulse: 79  Temp: 99  F (37.2  C)  Resp: 18  Weight: 107.1 kg (236 lb 1 oz)  SpO2: (!) 88 %      Physical Exam   Constitutional: No distress.   HENT:   Head: Atraumatic.   Mouth/Throat: Oropharynx is clear and moist. No oropharyngeal exudate.   Eyes: Pupils are equal,  round, and reactive to light. No scleral icterus.   Cardiovascular: Normal heart sounds and intact distal pulses.    Pulmonary/Chest: No respiratory distress. She has rhonchi in the right lower field. She has rales in the right lower field.   Abdominal: Soft. Bowel sounds are normal. There is no tenderness.   Musculoskeletal: She exhibits no edema or tenderness.   Neurological: She is alert. She displays tremor. She displays no atrophy. No cranial nerve deficit or sensory deficit. She exhibits normal muscle tone. Coordination and gait normal. GCS eye subscore is 4. GCS verbal subscore is 5. GCS motor subscore is 6.   Skin: Skin is warm. No rash noted. She is not diaphoretic.   Psychiatric: Her mood appears not anxious. Her speech is delayed (slight). She is slowed. She is not agitated, not aggressive, not withdrawn and not actively hallucinating. She does not express impulsivity. She exhibits a depressed mood. She expresses no suicidal ideation. She expresses no suicidal plans. She exhibits normal recent memory and normal remote memory. She is attentive.       ED Course     ED Course     Procedures             EKG Interpretation:      Interpreted by Chilo Vasquez  Time reviewed: 9:17 AM  Symptoms at time of EKG: Generalized weakness  Rhythm: normal sinus   Rate: normal  Axis: normal  Ectopy: none  Conduction: normal  ST Segments/ T Waves: No ST-T wave changes  Q Waves: none  Comparison to prior: Unchanged from February 12, 2017    Clinical Impression: normal EKG            Critical Care time:  none     Results for orders placed or performed during the hospital encounter of 01/14/18   XR Chest 2 Views    Narrative    CHEST TWO VIEWS January 14, 2018 9:54 AM     HISTORY: Productive cough for two weeks.    COMPARISON: 2/12/2017.      Impression    IMPRESSION: New patchy airspace opacities in the right lung concerning  for pneumonia. Small right pleural effusion. Left lung is relatively  clear. No pneumothorax. Cardiac  silhouette within normal limits. Mild  degenerative changes in the spine with anterior osteophytic spurring.    AFIA ESPARZA MD   CBC with platelets differential   Result Value Ref Range    WBC 7.2 4.0 - 11.0 10e9/L    RBC Count 3.72 (L) 3.8 - 5.2 10e12/L    Hemoglobin 11.6 (L) 11.7 - 15.7 g/dL    Hematocrit 36.6 35.0 - 47.0 %    MCV 98 78 - 100 fl    MCH 31.2 26.5 - 33.0 pg    MCHC 31.7 31.5 - 36.5 g/dL    RDW 12.9 10.0 - 15.0 %    Platelet Count 298 150 - 450 10e9/L    Diff Method Automated Method     % Neutrophils 58.9 %    % Lymphocytes 22.3 %    % Monocytes 13.0 %    % Eosinophils 3.1 %    % Basophils 0.3 %    % Immature Granulocytes 2.4 %    Nucleated RBCs 0 0 /100    Absolute Neutrophil 4.2 1.6 - 8.3 10e9/L    Absolute Lymphocytes 1.6 0.8 - 5.3 10e9/L    Absolute Monocytes 0.9 0.0 - 1.3 10e9/L    Absolute Eosinophils 0.2 0.0 - 0.7 10e9/L    Absolute Basophils 0.0 0.0 - 0.2 10e9/L    Abs Immature Granulocytes 0.2 0 - 0.4 10e9/L    Absolute Nucleated RBC 0.0    Comprehensive metabolic panel   Result Value Ref Range    Sodium 141 133 - 144 mmol/L    Potassium 4.1 3.4 - 5.3 mmol/L    Chloride 103 94 - 109 mmol/L    Carbon Dioxide 33 (H) 20 - 32 mmol/L    Anion Gap 5 3 - 14 mmol/L    Glucose 93 70 - 99 mg/dL    Urea Nitrogen 18 7 - 30 mg/dL    Creatinine 0.79 0.52 - 1.04 mg/dL    GFR Estimate 72 >60 mL/min/1.7m2    GFR Estimate If Black 87 >60 mL/min/1.7m2    Calcium 8.5 8.5 - 10.1 mg/dL    Bilirubin Total 0.4 0.2 - 1.3 mg/dL    Albumin 2.4 (L) 3.4 - 5.0 g/dL    Protein Total 6.6 (L) 6.8 - 8.8 g/dL    Alkaline Phosphatase 58 40 - 150 U/L    ALT 9 0 - 50 U/L    AST 7 0 - 45 U/L   Troponin I   Result Value Ref Range    Troponin I ES <0.015 0.000 - 0.045 ug/L   CRP inflammation   Result Value Ref Range    CRP Inflammation 122.0 (H) 0.0 - 8.0 mg/L   BNP   Result Value Ref Range    N-Terminal Pro BNP Inpatient 94 0 - 900 pg/mL   UA with Microscopic reflex to Culture   Result Value Ref Range    Color Urine Yellow      Appearance Urine Clear     Glucose Urine Negative NEG^Negative mg/dL    Bilirubin Urine Negative NEG^Negative    Ketones Urine Negative NEG^Negative mg/dL    Specific Gravity Urine 1.015 1.003 - 1.035    Blood Urine Negative NEG^Negative    pH Urine 6.0 5.0 - 7.0 pH    Protein Albumin Urine Negative NEG^Negative mg/dL    Urobilinogen mg/dL Normal 0.0 - 2.0 mg/dL    Nitrite Urine Negative NEG^Negative    Leukocyte Esterase Urine Negative NEG^Negative    Source Catheterized Urine     WBC Urine <1 0 - 2 /HPF    RBC Urine <1 0 - 2 /HPF    Squamous Epithelial /HPF Urine <1 0 - 1 /HPF    Transitional Epi <1 0 - 1 /HPF    Mucous Urine Present (A) NEG^Negative /LPF   Valproic acid   Result Value Ref Range    Valproic Acid Level 83 50 - 100 mg/L   D dimer quantitative   Result Value Ref Range    D Dimer 0.5 0.0 - 0.50 ug/ml FEU             Labs Ordered and Resulted from Time of ED Arrival Up to the Time of Departure from the ED   CBC WITH PLATELETS DIFFERENTIAL - Abnormal; Notable for the following:        Result Value    RBC Count 3.72 (*)     Hemoglobin 11.6 (*)     All other components within normal limits   COMPREHENSIVE METABOLIC PANEL - Abnormal; Notable for the following:     Carbon Dioxide 33 (*)     Albumin 2.4 (*)     Protein Total 6.6 (*)     All other components within normal limits   CRP INFLAMMATION - Abnormal; Notable for the following:     CRP Inflammation 122.0 (*)     All other components within normal limits   ROUTINE UA WITH MICROSCOPIC REFLEX TO CULTURE - Abnormal; Notable for the following:     Mucous Urine Present (*)     All other components within normal limits   TROPONIN I   NT PROBNP INPATIENT   VALPROIC ACID   D DIMER QUANTITATIVE   MEASURE WEIGHT   ORTHOSTATIC BLOOD PRESSURE AND PULSE   BLOOD CULTURE            Assessments & Plan (with Medical Decision Making)   72-year-old female presents for evaluation of generalized weakness.  Differential included medication side effect, infectious etiology,  metabolic abnormality, functional disability, injury, other.  Initial exam revealed that vital signs were normal with exception of slightly low oxygen in the 89 range on room air.  As well, patient was noted to have right lower lobe rhonchi.  Laboratories were obtained including CBC that was normal with exception of minimally low hemoglobin.  Comprehensive metabolic panel revealed slightly elevated carbon dioxide as well as slightly low albumin and total protein.  EKG and troponin were normal.  D-dimer and valproic acid levels as well as BNP were also normal.  Chest x-ray revealed patchy airspace opacities in the right lung.  Given that she has had cough that at times is productive of yellow sputum for the past 2 weeks and noted hypoxemia with above noted x-ray findings, I believe that community acquired pneumonia most likely explains her generalized weakness.  Blood cultures were obtained and the patient was started on the community acquired pneumonia protocol.  She was given azithromycin and ceftriaxone.  Sputum culture was ordered and the case was discussed at length with the hospitalist on-call who has agreed to accept the patient for further evaluation and management.    I have reviewed the nursing notes.    I have reviewed the findings, diagnosis, plan and need for follow up with the patient.    New Prescriptions    No medications on file       Final diagnoses:   Pneumonia of right lower lobe due to infectious organism (H)   Generalized muscle weakness       1/14/2018   [facility], [place], EMERGENCY DEPARTMENT     Chilo Vasquez MD  01/14/18 112
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
## 2018-01-14 NOTE — IP AVS SNAPSHOT
[facility] [address]    [address]    [place] [address]    Phone:  [phone]                                       After Visit Summary   1/14/2018    Lynda Delgadillo    MRN: [id number]           After Visit Summary Signature Page     I have received my discharge instructions, and my questions have been answered. I have discussed any challenges I see with this plan with the nurse or doctor.    ..........................................................................................................................................  Patient/Patient Representative Signature      ..........................................................................................................................................  Patient Representative Print Name and Relationship to Patient    ..................................................               ................................................  Date                                            Time    ..........................................................................................................................................  Reviewed by Signature/Title    ...................................................              ..............................................  Date                                                            Time

## 2018-01-14 NOTE — LETTER
Transition Communication Hand-off for Care Transitions to Next Level of Care Provider    Name: Lynda Delgadillo  MRN #: [id number]  Primary Care Provider: Owen Jurado     [facility] [address] [facility] [address]     Reason for Hospitalization:  Generalized muscle weakness [M62.81]  Pneumonia of right lower lobe due to infectious organism (H) [J18.1]  Admit Date/Time: 1/14/2018  8:17 AM  Discharge Date: 1/17/18  Payor Source: Payor: MEDICA / Plan: MEDICA [employer] [facility] [employer]ERS / Product Type: Indemnity /          Reason for Communication Hand-off Referral: post-hospital stay    Any outstanding tests or procedures:        Referrals     Future Labs/Procedures    Home care nursing referral     Comments:    [facility]  Phone  [phone]  Fax  [phone]  ______________________    RN skilled nursing visit. RN to assess vital signs and weight, respiratory and cardiac status, hydration, nutrition and bowel status and home safety.  RN to teach medication management.    Home health aide to assist with bathing and personal hygiene cares    Home Care OT Referral for Hospital Discharge     Comments:    Occupational Therapy- Evaluate and Treat.            AVS/Discharge Summary included

## 2018-01-14 NOTE — PLAN OF CARE
Problem: Patient Care Overview  Goal: Plan of Care/Patient Progress Review  Outcome: No Change  Pt arrived to floor from ED around 1330. Alert and oriented. VSS, continuous pulse ox in place. Denies pain. Up to bedside commode with assist x1. Voided x2 since arriving to floor. Pt settled in and oriented to room. Able to make needs known. Report given to oncoming nurse.

## 2018-01-15 ENCOUNTER — APPOINTMENT (OUTPATIENT)
Dept: PHYSICAL THERAPY | Facility: CLINIC | Age: 73
DRG: 194 | End: 2018-01-15
Attending: NURSE PRACTITIONER
Payer: COMMERCIAL

## 2018-01-15 ENCOUNTER — APPOINTMENT (OUTPATIENT)
Dept: OCCUPATIONAL THERAPY | Facility: CLINIC | Age: 73
DRG: 194 | End: 2018-01-15
Payer: COMMERCIAL

## 2018-01-15 PROBLEM — J15.9 COMMUNITY ACQUIRED BACTERIAL PNEUMONIA: Status: ACTIVE | Noted: 2018-01-15

## 2018-01-15 LAB
ANION GAP SERPL CALCULATED.3IONS-SCNC: 7 MMOL/L (ref 3–14)
BUN SERPL-MCNC: 17 MG/DL (ref 7–30)
CALCIUM SERPL-MCNC: 8.3 MG/DL (ref 8.5–10.1)
CHLORIDE SERPL-SCNC: 104 MMOL/L (ref 94–109)
CO2 SERPL-SCNC: 29 MMOL/L (ref 20–32)
CREAT SERPL-MCNC: 0.79 MG/DL (ref 0.52–1.04)
CRP SERPL-MCNC: 107 MG/L (ref 0–8)
ERYTHROCYTE [DISTWIDTH] IN BLOOD BY AUTOMATED COUNT: 12.8 % (ref 10–15)
FLUAV H1 2009 PAND RNA SPEC QL NAA+PROBE: NEGATIVE
FLUAV H1 RNA SPEC QL NAA+PROBE: NEGATIVE
FLUAV H3 RNA SPEC QL NAA+PROBE: NEGATIVE
FLUAV RNA SPEC QL NAA+PROBE: NEGATIVE
FLUBV RNA SPEC QL NAA+PROBE: NEGATIVE
GFR SERPL CREATININE-BSD FRML MDRD: 72 ML/MIN/1.7M2
GLUCOSE SERPL-MCNC: 89 MG/DL (ref 70–99)
HADV DNA SPEC QL NAA+PROBE: NEGATIVE
HADV DNA SPEC QL NAA+PROBE: NEGATIVE
HCT VFR BLD AUTO: 34.8 % (ref 35–47)
HGB BLD-MCNC: 11 G/DL (ref 11.7–15.7)
HMPV RNA SPEC QL NAA+PROBE: NEGATIVE
HPIV1 RNA SPEC QL NAA+PROBE: NEGATIVE
HPIV2 RNA SPEC QL NAA+PROBE: NEGATIVE
HPIV3 RNA SPEC QL NAA+PROBE: NEGATIVE
INTERPRETATION ECG - MUSE: NORMAL
IRON SATN MFR SERPL: 16 % (ref 15–46)
IRON SERPL-MCNC: 32 UG/DL (ref 35–180)
MCH RBC QN AUTO: 30.7 PG (ref 26.5–33)
MCHC RBC AUTO-ENTMCNC: 31.6 G/DL (ref 31.5–36.5)
MCV RBC AUTO: 97 FL (ref 78–100)
MICROBIOLOGIST REVIEW: NORMAL
PLATELET # BLD AUTO: 274 10E9/L (ref 150–450)
POTASSIUM SERPL-SCNC: 4.5 MMOL/L (ref 3.4–5.3)
RBC # BLD AUTO: 3.58 10E12/L (ref 3.8–5.2)
RHINOVIRUS RNA SPEC QL NAA+PROBE: NEGATIVE
RSV RNA SPEC QL NAA+PROBE: NEGATIVE
RSV RNA SPEC QL NAA+PROBE: NEGATIVE
S PNEUM AG SPEC QL: NORMAL
SODIUM SERPL-SCNC: 140 MMOL/L (ref 133–144)
SPECIMEN SOURCE: NORMAL
SPECIMEN SOURCE: NORMAL
TIBC SERPL-MCNC: 202 UG/DL (ref 240–430)
TRANSFERRIN SERPL-MCNC: 157 MG/DL (ref 210–360)
WBC # BLD AUTO: 7.6 10E9/L (ref 4–11)

## 2018-01-15 PROCEDURE — 94640 AIRWAY INHALATION TREATMENT: CPT

## 2018-01-15 PROCEDURE — 97165 OT EVAL LOW COMPLEX 30 MIN: CPT | Mod: GO | Performed by: OCCUPATIONAL THERAPIST

## 2018-01-15 PROCEDURE — 97530 THERAPEUTIC ACTIVITIES: CPT | Mod: GP

## 2018-01-15 PROCEDURE — 83540 ASSAY OF IRON: CPT | Performed by: NURSE PRACTITIONER

## 2018-01-15 PROCEDURE — 83550 IRON BINDING TEST: CPT | Performed by: NURSE PRACTITIONER

## 2018-01-15 PROCEDURE — 25000125 ZZHC RX 250: Performed by: NURSE PRACTITIONER

## 2018-01-15 PROCEDURE — 25000132 ZZH RX MED GY IP 250 OP 250 PS 637: Performed by: NURSE PRACTITIONER

## 2018-01-15 PROCEDURE — 40000133 ZZH STATISTIC OT WARD VISIT: Performed by: OCCUPATIONAL THERAPIST

## 2018-01-15 PROCEDURE — 40000275 ZZH STATISTIC RCP TIME EA 10 MIN

## 2018-01-15 PROCEDURE — 85027 COMPLETE CBC AUTOMATED: CPT | Performed by: NURSE PRACTITIONER

## 2018-01-15 PROCEDURE — 36416 COLLJ CAPILLARY BLOOD SPEC: CPT | Performed by: NURSE PRACTITIONER

## 2018-01-15 PROCEDURE — 97535 SELF CARE MNGMENT TRAINING: CPT | Mod: GO | Performed by: OCCUPATIONAL THERAPIST

## 2018-01-15 PROCEDURE — 99232 SBSQ HOSP IP/OBS MODERATE 35: CPT | Performed by: INTERNAL MEDICINE

## 2018-01-15 PROCEDURE — 97161 PT EVAL LOW COMPLEX 20 MIN: CPT | Mod: GP

## 2018-01-15 PROCEDURE — 97116 GAIT TRAINING THERAPY: CPT | Mod: GP

## 2018-01-15 PROCEDURE — 12000001 ZZH R&B MED SURG/OB UMMC

## 2018-01-15 PROCEDURE — 99207 ZZC CDG-MDM COMPONENT: MEETS MODERATE - DOWN CODED: CPT | Performed by: INTERNAL MEDICINE

## 2018-01-15 PROCEDURE — 25000128 H RX IP 250 OP 636: Performed by: NURSE PRACTITIONER

## 2018-01-15 PROCEDURE — 94640 AIRWAY INHALATION TREATMENT: CPT | Mod: 76

## 2018-01-15 PROCEDURE — 86140 C-REACTIVE PROTEIN: CPT | Performed by: NURSE PRACTITIONER

## 2018-01-15 PROCEDURE — 84466 ASSAY OF TRANSFERRIN: CPT | Performed by: NURSE PRACTITIONER

## 2018-01-15 PROCEDURE — 40000193 ZZH STATISTIC PT WARD VISIT

## 2018-01-15 PROCEDURE — 80048 BASIC METABOLIC PNL TOTAL CA: CPT | Performed by: NURSE PRACTITIONER

## 2018-01-15 PROCEDURE — 87899 AGENT NOS ASSAY W/OPTIC: CPT | Performed by: INTERNAL MEDICINE

## 2018-01-15 RX ADMIN — SODIUM CHLORIDE: 9 INJECTION, SOLUTION INTRAVENOUS at 11:45

## 2018-01-15 RX ADMIN — IPRATROPIUM BROMIDE AND ALBUTEROL SULFATE 3 ML: .5; 3 SOLUTION RESPIRATORY (INHALATION) at 12:23

## 2018-01-15 RX ADMIN — IPRATROPIUM BROMIDE AND ALBUTEROL SULFATE 3 ML: .5; 3 SOLUTION RESPIRATORY (INHALATION) at 16:57

## 2018-01-15 RX ADMIN — TOLTERODINE TARTRATE 2 MG: 2 CAPSULE, EXTENDED RELEASE ORAL at 07:55

## 2018-01-15 RX ADMIN — DULOXETINE HYDROCHLORIDE 60 MG: 60 CAPSULE, DELAYED RELEASE ORAL at 07:55

## 2018-01-15 RX ADMIN — BUPROPION HYDROCHLORIDE 450 MG: 300 TABLET, FILM COATED, EXTENDED RELEASE ORAL at 07:55

## 2018-01-15 RX ADMIN — CEFTRIAXONE 2 G: 2 INJECTION, POWDER, FOR SOLUTION INTRAMUSCULAR; INTRAVENOUS at 15:01

## 2018-01-15 RX ADMIN — LORAZEPAM 1 MG: 1 TABLET ORAL at 22:02

## 2018-01-15 RX ADMIN — AZITHROMYCIN MONOHYDRATE 500 MG: 500 INJECTION, POWDER, LYOPHILIZED, FOR SOLUTION INTRAVENOUS at 15:05

## 2018-01-15 RX ADMIN — ENOXAPARIN SODIUM 40 MG: 40 INJECTION SUBCUTANEOUS at 15:05

## 2018-01-15 RX ADMIN — GUAIFENESIN 600 MG: 600 TABLET, EXTENDED RELEASE ORAL at 22:02

## 2018-01-15 RX ADMIN — IPRATROPIUM BROMIDE AND ALBUTEROL SULFATE 3 ML: .5; 3 SOLUTION RESPIRATORY (INHALATION) at 09:02

## 2018-01-15 RX ADMIN — ARIPIPRAZOLE 25 MG: 5 TABLET ORAL at 07:55

## 2018-01-15 RX ADMIN — DIVALPROEX SODIUM 1500 MG: 500 TABLET, EXTENDED RELEASE ORAL at 22:02

## 2018-01-15 ASSESSMENT — ACTIVITIES OF DAILY LIVING (ADL)
ADLS_ACUITY_SCORE: 16
ADLS_ACUITY_SCORE: 19
PREVIOUS_RESPONSIBILITIES: MEAL PREP;MEDICATION MANAGEMENT;SHOPPING
ADLS_ACUITY_SCORE: 18

## 2018-01-15 NOTE — PLAN OF CARE
Problem: Patient Care Overview  Goal: Plan of Care/Patient Progress Review  Outcome: No Change  Pt up with assist of 1. Transferring with min assist. PIV infusing - pt can be saline locked after abx complete. CMS intact. Denies pain. VSS. Urine specimen sent to lab. Tolerating regular diet. Pt able to make needs known.

## 2018-01-15 NOTE — PLAN OF CARE
Problem: Patient Care Overview  Goal: Plan of Care/Patient Progress Review  Discharge Planner PT   Patient plan for discharge: Home  Current status: PT evaluation complete and treatment initiated. Seated in w/c upon arrival, quite distractible and fixated on contacting ILS worker. Completes sit<>stand transfer with supervision. Completed commode transfer with supervision, independent with hygiene cares. Tolerated ambulating in garcia with SBA and use of SEC, mild instability noted but no LOB.   Barriers to return to prior living situation: safety, independence, cognition  Recommendations for discharge: Rehab vs home with increased services?  Rationale for recommendations: Despite patient mobilizing fairly well, does not appear patient should be living alone. Very distractible, emotionally labile. Question possible cognitive component? Also has fallen and per chart called the ambulance x 2 recently       Entered by: Alexandra Baltazar 01/15/2018 12:11 PM

## 2018-01-15 NOTE — PROGRESS NOTES
01/15/18 1202   Quick Adds   Type of Visit Initial PT Evaluation       Present no   Language English   Living Environment   Lives With alone   Living Arrangements apartment   Home Accessibility no concerns   Number of Stairs to Enter Home 0   Number of Stairs Within Home 0   Self-Care   Dominant Hand right   Usual Activity Tolerance moderate   Current Activity Tolerance fair   Regular Exercise no   Equipment Currently Used at Home cane, straight  (also owns a walker)   Functional Level Prior   Ambulation 1-->assistive equipment   Transferring 0-->independent   Toileting 0-->independent   Bathing 1-->assistive equipment   Dressing 0-->independent   Eating 0-->independent   Communication 0-->understands/communicates without difficulty   Swallowing 0-->swallows foods/liquids without difficulty   Cognition 2 - difficulty with organizing thoughts   Fall history within last six months yes   Number of times patient has fallen within last six months 2   Which of the above functional risks had a recent onset or change? ambulation;transferring   General Information   Onset of Illness/Injury or Date of Surgery - Date 01/14/18   Referring Physician Millie Puente APRN CNP   Patient/Family Goals Statement Did not endorse any PT related goals   Pertinent History of Current Problem (include personal factors and/or comorbidities that impact the POC) 72 year old female with a history of chronic lymphedema, urinary incontinence, tobacco use (in remission), bipolar disorder, schizophrenia, and anxiety admitted complaints of one month cough and weakness, worsening over the last week.   Precautions/Limitations fall precautions   Weight-Bearing Status - LUE full weight-bearing   Weight-Bearing Status - RUE full weight-bearing   Weight-Bearing Status - LLE full weight-bearing   Weight-Bearing Status - RLE full weight-bearing   General Observations Sitting up in w/c upon arrival, agreeable but quite  distractible   General Info Comments IV   Cognitive Status Examination   Orientation orientation to person, place and time   Level of Consciousness alert   Follows Commands and Answers Questions 100% of the time;able to follow multistep instructions   Personal Safety and Judgment impulsive   Memory impaired   Pain Assessment   Patient Currently in Pain No   Integumentary/Edema   Integumentary/Edema Comments LE edema present, to be addressed tomorrow   Posture    Posture Forward head position;Protracted shoulders;Kyphosis   Posture Comments Moderate in sitting and standing   Range of Motion (ROM)   ROM Comment Did not formally assess, demonstrates functional ROM with mobility   Strength   Strength Comments Did not formally assess, demonstrates functional strength with mobiliyt but generally deconditioned with decreased activity tolerance   Bed Mobility   Bed Mobility Comments Did not assess, up in w/c and requesting to remain up following time together   Transfer Skills   Transfer Comments Completes sit<>stand transfers at SBA   Gait   Gait Comments Tolerates ambulating in garcia with SBA and use of cane, fairly good stability throughout   Balance   Balance Comments Independent sitting balance, SBA for standing balance with UE support from cane   Sensory Examination   Sensory Perception no deficits were identified   General Therapy Interventions   Planned Therapy Interventions balance training;bed mobility training;gait training;ROM;strengthening;transfer training;risk factor education;home program guidelines;progressive activity/exercise   Clinical Impression   Criteria for Skilled Therapeutic Intervention yes, treatment indicated   PT Diagnosis impaired functional mobility   Influenced by the following impairments impaired bed mobility, transfers and ambulation   Functional limitations due to impairments decreased activity tolerance, decreased standing balance, cognition   Clinical Presentation Stable/Uncomplicated  "  Clinical Presentation Rationale 72 year old female with a history of chronic lymphedema, urinary incontinence, tobacco use (in remission), bipolar disorder, schizophrenia, and anxiety admitted complaints of one month cough and weakness, worsening over the last week. Mobilizing fairly well   Clinical Decision Making (Complexity) Low complexity   Therapy Frequency` daily   Predicted Duration of Therapy Intervention (days/wks) 7 days   Anticipated Equipment Needs at Discharge (has cane and walker)   Anticipated Discharge Disposition Home with Home Therapy;Transitional Care Facility  (Dual plan, does not appear should be living independently)   Risk & Benefits of therapy have been explained Yes   Patient, Family & other staff in agreement with plan of care Yes   [facility] TM \"6 Clicks\"   2016, Trustees of [facility], under license to [employer].  All rights reserved.   6 Clicks Short Forms Basic Mobility Inpatient Short Form   [facility]  \"6 Clicks\" V.2 Basic Mobility Inpatient Short Form   1. Turning from your back to your side while in a flat bed without using bedrails? 4 - None   2. Moving from lying on your back to sitting on the side of a flat bed without using bedrails? 4 - None   3. Moving to and from a bed to a chair (including a wheelchair)? 4 - None   4. Standing up from a chair using your arms (e.g., wheelchair, or bedside chair)? 4 - None   5. To walk in hospital room? 4 - None   6. Climbing 3-5 steps with a railing? 3 - A Little   Basic Mobility Raw Score (Score out of 24.Lower scores equate to lower levels of function) 23   Total Evaluation Time   Total Evaluation Time (Minutes) 10     "

## 2018-01-15 NOTE — PLAN OF CARE
"Problem: Patient Care Overview  Goal: Plan of Care/Patient Progress Review  Outcome: No Change    VS:   Vitals are stable and within normal limits. Lung sounds are diminished. O2 sats in the low 90's- encouraged IS use. Infrequent productive cough with very small amount of sputum.   Output:   Pt has urinary urgency- once she was incontinent on the floor while transferring to the commode and two other times she was incontinent in her brief due to not making it to commode in time. Passing flatus- medium BM overnight.     Activity:   Assist of one to commode.    Skin: Intact. Lower bilateral extremities mottled with dry flaky skin.    Pain:   Denies pain.    Neuro/CMS:   CMS/neuros are intact. Alert and oriented X4.   Dressing(s):   N/A   Diet:   Tolerating regular diet.    LDA:   PIV in right lower forearm is infusing NS at 100mL/hr.    Equipment:   Commode.    Plan:   To be determined. Will continue to monitor.    Additional Info:   Pt's mood is labile. At beginning of shift she was withdrawn and then she became irritable and hostile. later in the evening she got very talkative and told this staff she was \"feeling so giddy\".   Unable to obtain urine due to incontinence. Pt has specimen cup for sputum sample but has has very little sputum. Droplet precautions maintained.            "

## 2018-01-15 NOTE — PROGRESS NOTES
Progress Note        Lynda Delgadillo [[id number]] (F) - 72 year old          Assessment and Plan:   Lynda Delgadillo is a 72 year old female with a history of chronic lymphedema, urinary incontinence, tobacco use (in remission), bipolar disorder, schizophrenia, and anxiety admitted complaints of one month cough and weakness, worsening over the last week.      # Rt.lung community acquire pneumonia-     possible bacterial  Pneumonia     - Low grade temp to 99.  CXR on admission w/ new patchy airspace opacities in RLL c/f PNA, small R pleural effusion, no cardiomegaly.  .  Rapid flu neg.   Blood cx NGTD.  UA neg. No leukocytosis.   Started on  Ceftriaxone and Azithromycin in ED- continue the same - will changed to levofloxacin in 1-2 days  Sputum cx pending.    Aspiration PNA possible but less likely given absence of fevers (recently using dentures and has had difficulty w/ chewing, swallowing).    - CRP trending down   - RVP, Strep pneumo added   - Mucinex 600mg BID  - Scheduled DuoNebs QID for couple of days and will change to PRN   - IS Q2H while awake   - Droplet precautions while waiting for RSV testing   - PT/OT      # Bipolar disorder, schizophrenia, anxiety - Mood stable.  On Abilify 20mg PO daily, Wellbutrin 450mg PO daily, Depakote 1500mg PO daily, Cymbalta 60mg PO daily PTA.  - Continue PTA meds       # Anemia - Hgb 11.6, likely 2/2 acute illness.  BL closer to ~ 14 per chart review. No s/s of acute bleed.   - CBC in am   - Iron studies showed anemia of chronic disease     # Urinary incontinence - Continue PTA Detrol, U/A negative for UTI      # Lymphedema, chronic - Stable.    - Lymphedema consult placed  - Keep LUEs elevated    - mild erythema on left LE - not appears cellulitis - will monitor      # Nutrition - Protein 6.6, Albumin 2.4 this admission.   - Nutrition consult placed  - Snacks and supplements between meals            CODE: Full   FEN: NS 0.9% at 100cc/hr pending improvement in PO  intake   DVT: Heparin    Disposition Plan   Expected discharge in 2-3  days to prior living arrangement once pneumonia improved .     Entered: Bang Watkins 01/15/2018, 11:05 AM         Subjective:   Sitting in chair   Feels little strong  No fever or chills   Tolerating diet   Denies short ness of breath and chest pain       Ros:  4-point ROS negative except in subjective/interval history.     Objective         Physical Exam:  /72 (BP Location: Left arm)  Pulse 83  Temp 97  F (36.1  C) (Oral)  Resp 20  Wt 118 kg (260 lb 3.2 oz)  SpO2 96%  Breastfeeding? No  BMI 39.56 kg/m2  General: No distress, cooperative, pleasant  HEENT:NC/AT,PERRL. EOMI  Pulmonary: CTAB, normal respiratory effort. No wheezes, rales or rhonchi   Cardiovascular: RRR. S1 and S2 preset. No m/g/r.  Abdomen:BS+, soft, non-tender, non-distended. No guarding or rebound tenderness.   Extremities: Warm and well perfused. No lower extremity edema.  Neuro: Alert and oriented x 3. Moves all extremities symmetrically.     LINES & TUBES:   LABS (Last four results)  CMP  Recent Labs  Lab 01/15/18  0658 01/14/18  1500 01/14/18  0854     --  141   POTASSIUM 4.5  --  4.1   CHLORIDE 104  --  103   CO2 29  --  33*   ANIONGAP 7  --  5   GLC 89  --  93   BUN 17  --  18   CR 0.79 0.73 0.79   GFRESTIMATED 72 78 72   GFRESTBLACK 87 >90 87   HITESH 8.3*  --  8.5   PROTTOTAL  --   --  6.6*   ALBUMIN  --   --  2.4*   BILITOTAL  --   --  0.4   ALKPHOS  --   --  58   AST  --   --  7   ALT  --   --  9     CBC  Recent Labs  Lab 01/15/18  0658 01/14/18  1500 01/14/18  0854   WBC 7.6  --  7.2   RBC 3.58*  --  3.72*   HGB 11.0*  --  11.6*   HCT 34.8*  --  36.6   MCV 97  --  98   MCH 30.7  --  31.2   MCHC 31.6  --  31.7   RDW 12.8  --  12.9    274 298     CRP: 122--->107     Recent Labs  Lab 01/14/18  0854   CULT No growth after 17 hours            Bang Watkins  Hospitalist   [facility], [place]     1/15/2018

## 2018-01-15 NOTE — PROGRESS NOTES
"SPIRITUAL HEALTH SERVICES  SPIRITUAL ASSESSMENT Progress Note  [facility] ([facility]) 10A     REFERRAL SOURCE: hospital  epic consult    Met with Lynda to offer spiritual and emotional support.  She declined the visit and said \"another time.\"     PLAN: I will check back in tomorrow to follow up on this request.     Nathalie Leahy  Chaplain Resident  Pager [phone]    "

## 2018-01-15 NOTE — PLAN OF CARE
"Problem: Patient Care Overview  Goal: Plan of Care/Patient Progress Review  Outcome: No Change  Pt. VSS, LS diminished with slight crackles. Maintaing O2 sats. Up SBA to bedside commode, voided 2x this shift. Respiratory panel sent down to lab. Bed alarm on , uses call light periodically. Mood fluctuates greatly, even within minutes. In speaking with Pt. She revealed that she has been unable to get to the store to get groceries, unable to care for her home, laundry, etc. In additon, she states that she feel lonely, isolated, and has been \"in a depression for several months\". Stated she has bipolar disorder.     She did want to speak with a stephane, order entered. Her living situation will likely need to be addresses and re-evaluated before discharge. Pt. States she has called the ambulance 2x in the last week.     Tolerating regular diet, lactose intollerant.     Call light within reach, continue with plan of care for next shift and increase visualization of pt.       "

## 2018-01-15 NOTE — PROGRESS NOTES
01/15/18 1146   Quick Adds   Type of Visit Initial Occupational Therapy Evaluation   Living Environment   Lives With alone   Living Arrangements apartment   Home Accessibility no concerns   Number of Stairs to Enter Home 0   Number of Stairs Within Home 0   Living Environment Comment Has a walk-in shower with grab bars and a shower chair, and has a grab bar by the toilet   Self-Care   Dominant Hand right   Usual Activity Tolerance moderate   Current Activity Tolerance fair   Regular Exercise yes   Activity/Exercise Type walking   Exercise Amount/Frequency 5-7 times/wk   Equipment Currently Used at Home cane, straight;grab bar;shower chair   Activity/Exercise/Self-Care Comment Patient does not drive. Reports relies on people for rides and uses grocery delivery. Reports walking a couple blocks to the store several days per week. Used to get meals delivered but reports has not had them x 6 weeks.   Functional Level Prior   Ambulation 1-->assistive equipment  (cane)   Transferring 1-->assistive equipment   Toileting 1-->assistive equipment  (grab bar)   Bathing 1-->assistive equipment  (grab bar, shower chair)   Dressing 0-->independent   Eating 0-->independent   Communication 0-->understands/communicates without difficulty   Swallowing 0-->swallows foods/liquids without difficulty   Cognition 2 - difficulty with organizing thoughts   Fall history within last six months yes   Number of times patient has fallen within last six months 1   Prior Functional Level Comment Repots increased difficulty cooking, getting groceries, getting transportation. Has an ILS worker (Elaina) that helps organize community resources and patient reports meeting with her regularly (every three weeks). Reports 1 recent fall where she rolled out of bed.   General Information   Onset of Illness/Injury or Date of Surgery - Date 01/14/18   Referring Physician Millie Puente APRN CNP   Patient/Family Goals Statement Reports need to get  increased help at home   Additional Occupational Profile Info/Pertinent History of Current Problem 72 year old female with a history of chronic lymphedema, urinary incontinence, tobacco use (in remission), bipolar disorder, schizophrenia, and anxiety admitted complaints of one month cough and weakness, worsening over the last week.   Precautions/Limitations fall precautions   Weight-Bearing Status - LLE full weight-bearing   Weight-Bearing Status - RLE full weight-bearing   General Observations Agreeable to OT   Cognitive Status Examination   Orientation orientation to person, place and time   Level of Consciousness alert   Able to Follow Commands mild impairment   Personal Safety (Cognitive) mild impairment   Attention Distractible during evaluation   Cognitive Comment Slightly off on the exact date, but otherwise able to tell month/year/place/city.   Visual Perception   Visual Perception Comments Wears reading glasses   Sensory Examination   Sensory Quick Adds No deficits were identified   Sensory Comments denies numbness or tingling; responds appropriately to light touch   Pain Assessment   Patient Currently in Pain No  (denies pain)   Range of Motion (ROM)   ROM Comment WNL except limited to approx 100 degrees flexion at both shoulders. Limited at hips/knees for LE dressing   Strength   Strength Comments 4/5 B shoulders; 4+/5 B biceps and triceps; fair grasp    Muscle Tone Assessment   Muscle Tone Comments Very mild tremors noted at times   Coordination   Coordination Comments Able to feed self, operate call light   Mobility   Bed Mobility Comments SBA supine<>EOB   Transfer Skill: Bed to Chair/Chair to Bed   Level of [place]: Bed to Chair contact guard   Physical Assist/Nonphysical Assist: Bed to Chair verbal cues   Transfer Skill: Sit to Stand   Level of [place]: Sit/Stand stand-by assist   Physical Assist/Nonphysical Assist: Sit/Stand verbal cues   Transfer Skill: Toilet Transfer   Level of  [place]: Toilet contact guard   Physical Assist/Nonphysical Assist: Toilet verbal cues   Weight-Bearing Restrictions: Toilet full weight-bearing   Assistive Device straight cane   Balance   Balance Comments CGA with use of cane to ambulate   Upper Body Dressing   Level of [place]: Dress Upper Body stand-by assist   Physical Assist/Nonphysical Assist: Dress Upper Body set-up required   Lower Body Dressing   Level of [place]: Dress Lower Body contact guard   Physical Assist/Nonphysical Assist: Dress Lower Body set-up required   Toileting   Level of [place]: Toilet contact guard   Grooming   Level of [place]: Grooming contact guard   Eating/Self Feeding   Level of [place]: Eating independent   Physical Assist/Nonphysical Assist: Eating set-up required   Instrumental Activities of Daily Living (IADL)   Previous Responsibilities meal prep;medication management;shopping   IADL Comments Needs further clarification with ILS worker   Activities of Daily Living Analysis   Impairments Contributing to Impaired Activities of Daily Living balance impaired;strength decreased;flexibility decreased   General Therapy Interventions   Planned Therapy Interventions ADL retraining;IADL retraining;cognition;strengthening;transfer training   Clinical Impression   Criteria for Skilled Therapeutic Interventions Met yes, treatment indicated   OT Diagnosis impaired ADL, IADL and endurance   Influenced by the following impairments limited endurance, mental health diagnoses   Assessment of Occupational Performance 3-5 Performance Deficits   Identified Performance Deficits ADL, IADL, mobility, cognition   Clinical Decision Making (Complexity) Low complexity   Therapy Frequency daily   Predicted Duration of Therapy Intervention (days/wks) 1 week   Anticipated Equipment Needs at Discharge shower chair   Anticipated Discharge Disposition Home with Home Therapy  (vs. more supportive living setting like [facility])   Risks and  Benefits of Treatment have been explained. Yes   Patient, Family & other staff in agreement with plan of care Yes   Total Evaluation Time   Total Evaluation Time (Minutes) 10

## 2018-01-15 NOTE — PLAN OF CARE
Problem: Patient Care Overview  Goal: Plan of Care/Patient Progress Review  Discharge Planner OT   Patient plan for discharge: Expresses need for more support at home  Current status: CGA with transfers and mobility; cues to stay on task. Pleasant and appropriate during OT session.  Barriers to return to prior living situation: Lives alone, mental health history; has ILS worker, but patient reports needing more assistance at home and has been having difficulty managing IADL.  Recommendations for discharge: Home PT/OT/HHA for bathing vs. Looking into more supportive living situation such as custodial.May need to connect with ILS worker (Elaina) to see how she has been managing at home.  Rationale for recommendations: Patient would benefit from increased support for IADL such as transportation, meals, shopping, meds, etc.       Entered by: Tatiana Polanco 01/15/2018 12:58 PM

## 2018-01-15 NOTE — PROGRESS NOTES
CLINICAL NUTRITION SERVICES - ASSESSMENT NOTE     Nutrition Prescription    RECOMMENDATIONS FOR MDs/PROVIDERS TO ORDER:  None today    Malnutrition Status:    Patient does not meet two of the above criteria necessary for diagnosing malnutrition    Recommendations already ordered by Registered Dietitian (RD):  Discontinue Ensure Plus  Ensure Plus Shake at HS snack (vanilla)    Future/Additional Recommendations:  Reduce nutritional supplements pending PO intakes.      REASON FOR ASSESSMENT  Lynda Delgadillo is a/an 72 year old female assessed by the dietitian for Provider Order - Poor oral intake in setting of acute illness    NUTRITION HISTORY  - Pt reports her appetite is getting better but still she doesn't have much desire to eat. PTA, she was eating maybe 1 time per day, consisting of a banana and some grapes. She recently tried a boxed beans and rice ([employer]) and pt enjoyed this. She reports eating chicken thighs, almond milk and other boxed, easy meals.   - She recently got dentures (does not have during this admission) and has been having trouble eating with them. She feels she can eat easier without dentures.   - Overall, pt feels her diet is poor d/t living alone and not getting groceries often. She states she used to have a grocery service, [employer] but did not like this. Pt asked her independent skills worker, Elaina GUIDO, if [employer] would be possible as another person in her building uses this but Elaina apparently said no. Pt feels access to groceries is a barrier to her eating well at home. She also mentioned her increased depression makes it more difficult to want to make food for herself.     CURRENT NUTRITION ORDERS  Diet: Regular; Ensure Clear PM snack  Intake/Tolerance: pt reports eating all her meals here and enjoying the hamburger. Per flowsheet, pt is eating 100% of meals. Yesterday she ordered 1840 kcal and 84 g protein with 2 meals (pt was admitted after breakfast). She feels she is  "eating well here.     LABS  Labs reviewed  -  (H)  - Albumin 2.4 (L) - Due to albumin being a negative acute phase protein, would expect low albumin level in response to inflammation, and is therefore not an appropriate indicator of protein/nutrition status in the acute care setting    MEDICATIONS  Medications reviewed    ANTHROPOMETRICS  Height: 0 cm (Data Unavailable)  Ht Readings from Last 2 Encounters:   02/12/17 1.727 m (5' 8\")   12/25/16 1.727 m (5' 8\")   Most Recent Weight: 118 kg (260 lb 3.2 oz)    IBW: 63.6 kg (186% IBW)  BMI: Obesity Grade II BMI 35-39.9  Weight History: pt appears to have lost 8 lbs (3%) over the last 3.5 months. Pt reports her UBW is 280 lbs and she weighed this this past summer. Question accuracy given highest wt over the last year shows 270 lbs. Pt overall feels weaker.  Wt Readings from Last 5 Encounters:   01/14/18 118 kg (260 lb 3.2 oz)   09/27/17 121.6 kg (268 lb 3 oz)   02/13/17 122.4 kg (269 lb 14.4 oz)   12/25/16 120.7 kg (266 lb)   08/25/16 123.2 kg (271 lb 11.2 oz)     Dosing Weight: 77 kg - adjusted from admit wt    ASSESSED NUTRITION NEEDS  Estimated Energy Needs: 2970-4571 kcals/day (25 - 30 kcals/kg)  Justification: Maintenance and Obese  Estimated Protein Needs: 77-92 grams protein/day (1 - 1.2 grams of pro/kg)  Justification: Maintenance  Estimated Fluid Needs: 1 mL/kcal  Justification: Per provider pending fluid status    PHYSICAL FINDINGS  See malnutrition section below.    MALNUTRITION  % Intake: Decreased intake does not meet criteria  % Weight Loss: Weight loss does not meet criteria  Subcutaneous Fat Loss: None observed  Muscle Loss: None observed  Fluid Accumulation/Edema: Trace peripheral (chronic lymphedema)  Malnutrition Diagnosis: Patient does not meet two of the above criteria necessary for diagnosing malnutrition    NUTRITION DIAGNOSIS  Predicted inadequate energy/protein intake related to variable appetite as evidenced by pt reliant on PO intakes to " meet 100% of nutritional needs with potential for variation         INTERVENTIONS  Implementation  Discussed nutrition history and PO since admission. Discussed menu ordering and snacks available on the unit. Pt enjoys the food here and is excited to try other menu items. Discussed oral nutrition supplements and pt dislikes the Ensure Plus but does enjoy the Ensure Plus Shake. Encouraged adequate PO of food and fluids.  Discussed grocery services post D/C and recommended [employer], amaz[employer], or "[employer]. Encouraged pt to discuss this with Elaina as pt does not use the computer. [employer] does accept orders via telephone.     Goals  Patient to consume % of nutritionally adequate meal trays TID, or the equivalent with supplements/snacks.     Monitoring/Evaluation  Progress toward goals will be monitored and evaluated per protocol.      Kyleigh Celaya RD, LD  Unit Pager: [phone]

## 2018-01-16 ENCOUNTER — APPOINTMENT (OUTPATIENT)
Dept: PHYSICAL THERAPY | Facility: CLINIC | Age: 73
DRG: 194 | End: 2018-01-16
Payer: COMMERCIAL

## 2018-01-16 ENCOUNTER — APPOINTMENT (OUTPATIENT)
Dept: OCCUPATIONAL THERAPY | Facility: CLINIC | Age: 73
DRG: 194 | End: 2018-01-16
Payer: COMMERCIAL

## 2018-01-16 LAB
GRAM STN SPEC: NORMAL
Lab: NORMAL
SPECIMEN SOURCE: NORMAL

## 2018-01-16 PROCEDURE — 40000193 ZZH STATISTIC PT WARD VISIT

## 2018-01-16 PROCEDURE — 97535 SELF CARE MNGMENT TRAINING: CPT | Mod: GO | Performed by: OCCUPATIONAL THERAPIST

## 2018-01-16 PROCEDURE — 97116 GAIT TRAINING THERAPY: CPT | Mod: GP

## 2018-01-16 PROCEDURE — 87205 SMEAR GRAM STAIN: CPT | Performed by: NURSE PRACTITIONER

## 2018-01-16 PROCEDURE — 94640 AIRWAY INHALATION TREATMENT: CPT | Mod: 76

## 2018-01-16 PROCEDURE — 97110 THERAPEUTIC EXERCISES: CPT | Mod: GP

## 2018-01-16 PROCEDURE — 25000132 ZZH RX MED GY IP 250 OP 250 PS 637: Performed by: NURSE PRACTITIONER

## 2018-01-16 PROCEDURE — 25000128 H RX IP 250 OP 636: Performed by: NURSE PRACTITIONER

## 2018-01-16 PROCEDURE — 94640 AIRWAY INHALATION TREATMENT: CPT

## 2018-01-16 PROCEDURE — 99232 SBSQ HOSP IP/OBS MODERATE 35: CPT | Performed by: INTERNAL MEDICINE

## 2018-01-16 PROCEDURE — 97530 THERAPEUTIC ACTIVITIES: CPT | Mod: GP

## 2018-01-16 PROCEDURE — 40000275 ZZH STATISTIC RCP TIME EA 10 MIN

## 2018-01-16 PROCEDURE — 40000133 ZZH STATISTIC OT WARD VISIT: Performed by: OCCUPATIONAL THERAPIST

## 2018-01-16 PROCEDURE — 25000125 ZZHC RX 250: Performed by: NURSE PRACTITIONER

## 2018-01-16 PROCEDURE — 12000001 ZZH R&B MED SURG/OB UMMC

## 2018-01-16 PROCEDURE — 87070 CULTURE OTHR SPECIMN AEROBIC: CPT | Performed by: NURSE PRACTITIONER

## 2018-01-16 RX ADMIN — IPRATROPIUM BROMIDE AND ALBUTEROL SULFATE 3 ML: .5; 3 SOLUTION RESPIRATORY (INHALATION) at 16:18

## 2018-01-16 RX ADMIN — TOLTERODINE TARTRATE 2 MG: 2 CAPSULE, EXTENDED RELEASE ORAL at 08:16

## 2018-01-16 RX ADMIN — CEFTRIAXONE 2 G: 2 INJECTION, POWDER, FOR SOLUTION INTRAMUSCULAR; INTRAVENOUS at 14:15

## 2018-01-16 RX ADMIN — DULOXETINE HYDROCHLORIDE 60 MG: 60 CAPSULE, DELAYED RELEASE ORAL at 08:16

## 2018-01-16 RX ADMIN — GUAIFENESIN 600 MG: 600 TABLET, EXTENDED RELEASE ORAL at 21:06

## 2018-01-16 RX ADMIN — LORAZEPAM 1 MG: 1 TABLET ORAL at 22:01

## 2018-01-16 RX ADMIN — BUPROPION HYDROCHLORIDE 450 MG: 300 TABLET, FILM COATED, EXTENDED RELEASE ORAL at 08:15

## 2018-01-16 RX ADMIN — IPRATROPIUM BROMIDE AND ALBUTEROL SULFATE 3 ML: .5; 3 SOLUTION RESPIRATORY (INHALATION) at 19:52

## 2018-01-16 RX ADMIN — GUAIFENESIN 600 MG: 600 TABLET, EXTENDED RELEASE ORAL at 08:15

## 2018-01-16 RX ADMIN — IPRATROPIUM BROMIDE AND ALBUTEROL SULFATE 3 ML: .5; 3 SOLUTION RESPIRATORY (INHALATION) at 12:19

## 2018-01-16 RX ADMIN — ARIPIPRAZOLE 25 MG: 5 TABLET ORAL at 08:15

## 2018-01-16 RX ADMIN — IPRATROPIUM BROMIDE AND ALBUTEROL SULFATE 3 ML: .5; 3 SOLUTION RESPIRATORY (INHALATION) at 08:08

## 2018-01-16 RX ADMIN — DIVALPROEX SODIUM 1500 MG: 500 TABLET, EXTENDED RELEASE ORAL at 21:06

## 2018-01-16 RX ADMIN — ENOXAPARIN SODIUM 40 MG: 40 INJECTION SUBCUTANEOUS at 14:15

## 2018-01-16 ASSESSMENT — ACTIVITIES OF DAILY LIVING (ADL)
ADLS_ACUITY_SCORE: 18
ADLS_ACUITY_SCORE: 17
ADLS_ACUITY_SCORE: 18
ADLS_ACUITY_SCORE: 17

## 2018-01-16 NOTE — PLAN OF CARE
Problem: Patient Care Overview  Goal: Plan of Care/Patient Progress Review  Outcome: No Change        VS:   A&O, VSS, afebrile, O2 remains above 92% while awake, 1L NC placed when sleeping. LS diminished. Denies SOB/CP.    Output:   Urgent urination. Incontinence x2. Briefs worn.   Activity:   Uses call light approprietly, SBA w/ cane to BR.   Skin: Intact. +1 edema, redness/dry skin on BLE.   Pain:   Denies any pain.   Neuro/CMS:   Intact. Denies numbness/tingling.   Dressing(s):      Diet:   Regular diet tolerated.    LDA:   L lower arm IV SL.    Equipment:   Cane.   Plan:   Undetermined. Will continue to monitor.   Additional Info:   Pt was calm & cooperative w/ care. Held stool softener due to loose stool.

## 2018-01-16 NOTE — PROGRESS NOTES
[facility], [place]   Internal Medicine Daily Note          Assessment and Plan:    Lynda Delgadillo is a 72 year old female with a history of chronic lymphedema, urinary incontinence, tobacco use (in remission), bipolar disorder, schizophrenia, and anxiety admitted complaints of one month cough and weakness, worsening over the last week.      # Rt.lung community acquire pneumonia    possible bacterial  Pneumonia     - Low grade temp to 99.  CXR on admission w/ new patchy airspace opacities in RLL c/f PNA, small R pleural effusion, no cardiomegaly.  .  Rapid flu neg.   Blood cx NGTD.  UA neg. No leukocytosis.   Started on Ceftriaxone( day 3)  and Azithromycin  ( day 3) - continue the same - will changed to orals tomorrow   Sputum cx pending.  - CRP trending down   - RVP, Strep pneumo added   - Mucinex 600mg BID  - Scheduled DuoNebs QID for couple of days and will change to PRN   - IS Q2H while awake   - Droplet precautions while waiting for RSV testing   - PT/OT       # Bipolar disorder, schizophrenia, anxiety -   Mood stable.  On Abilify 20mg PO daily, Wellbutrin 450mg PO daily, Depakote 1500mg PO daily, Cymbalta 60mg PO daily PTA.  - Continue PTA meds        # Anemia -   Hgb 11.6, likely 2/2 acute illness.  BL closer to ~ 14 per chart review. No s/s of acute bleed.   - CBC in am   - Iron studies showed anemia of chronic disease      # Urinary incontinence -   Continue PTA Detrol, U/A negative for UTI       # Lymphedema, chronic - Stable.    - Lymphedema consult placed  - Keep LUEs elevated    - mild erythema on left LE - not appears cellulitis - will monitor       # Nutrition - Protein 6.6, Albumin 2.4 this admission.   - Nutrition consult placed  - Snacks and supplements between meals            Consulting teams: None   Code status: Full   DVT Prophylaxis: Enoxaparin  Gastric prophylaxis:Not required   Diet: Regular adult   Disposition: Likely home tomorrow       Patient seen  "and examined with RN         Interval History:   Progress notes in the last 24 hrs by MDT team has been reviewed.   This is the first time I am meeting with Marleny. Her H&P and progress of events has been reviewed.  Sign out received from Dr Watkins   Patient feels well this morning  Denies fevers or chills  Eating and drinking well   Mild fatigue            Review of Systems:   A comprehensive review of systems was performed and found to be negative except: Those that are outlined in interval history              Medications:   I have reviewed this patient's current medications which are outlined in the \"current medication\" section of EPIC             Physical Exam:   Vitals were reviewed  Temp: 98.8  F (37.1  C) Temp src: Oral BP: 113/65 Pulse: 97   Resp: 16 SpO2: (!) 88 % O2 Device: None (Room air) Oxygen Delivery: 1 LPM    Constitutional:   awake, alert, cooperative, no apparent distress, and appears stated age     Lungs:   No increased work of breathing, Coarse breath sounds on the right side      Cardiovascular:   Normal apical impulse, regular rate and rhythm, normal S1 and S2, no S3 or S4, and no murmur noted     Abdomen:   No scars, normal bowel sounds, soft, non-distended, non-tender, no masses palpated, no hepatosplenomegally     Musculoskeletal:   no lower extremity pitting edema present     Neurologic:   Awake, alert, oriented to name, place and time.  Cranial nerves II-XII are grossly intact.  .                Data:     Most recent labs have been evaluated and relevant labs are outlined below :    BMP    Recent Labs  Lab 01/15/18  0658 01/14/18  1500 01/14/18  0854     --  141   POTASSIUM 4.5  --  4.1   CHLORIDE 104  --  103   HITESH 8.3*  --  8.5   CO2 29  --  33*   BUN 17  --  18   CR 0.79 0.73 0.79   GLC 89  --  93     CBC    Recent Labs  Lab 01/15/18  0658 01/14/18  1500 01/14/18  0854   WBC 7.6  --  7.2   RBC 3.58*  --  3.72*   HGB 11.0*  --  11.6*   HCT 34.8*  --  36.6   MCV 97  --  98   MCH " 30.7  --  31.2   MCHC 31.6  --  31.7   RDW 12.8  --  12.9    274 298     INRNo lab results found in last 7 days.  LFTs    Recent Labs  Lab 01/14/18  0854   ALKPHOS 58   AST 7   ALT 9   BILITOTAL 0.4   PROTTOTAL 6.6*   ALBUMIN 2.4*      PANCNo lab results found in last 7 days.      Dr LATA Jung MD  Hospitalist ( Internal medicine)  Pager: [phone]

## 2018-01-16 NOTE — PLAN OF CARE
Problem: Patient Care Overview  Goal: Plan of Care/Patient Progress Review  Outcome: Improving  Pt up in chair for most of shift. Denies pain. PIV saline locked. Ambulating with therapy. Tolerating regular diet with good appetite. VSS. Oxygen saturation dropping itno upper 80's on room. Patient on 1-2lpm NC. Other VSS. Pt able to make needs known.

## 2018-01-16 NOTE — PLAN OF CARE
"Problem: Patient Care Overview  Goal: Plan of Care/Patient Progress Review  Discharge Planner PT   Patient plan for discharge: Undecided. Patient expresses the need for increased assist  Current status: Participatory in session but adamantly declines addressing any lymphedema needs, states \"my sister has it much worse, I don't need any of that\". Of note she does have some mild swelling to her LEs but no pitting edema present. Completes bed mobility with supervision and transfers SBA both with and without use of the cane. Tolerated ambulating in garcia with cane and SBA, mild instability noted. Had a visitor at the end of our session and was thrilled. Ended up in chair with her visitor  Barriers to return to prior living situation: safety, independence, cognition, balance  Recommendations for discharge: Rehab vs home with increased assist. Currently may benefit from rehab  Rationale for recommendations: To progress safety and independence with functional mobility       Entered by: Alexandra Baltazar 01/16/2018 2:05 PM           "

## 2018-01-16 NOTE — PLAN OF CARE
Problem: Patient Care Overview  Goal: Plan of Care/Patient Progress Review  Outcome: No Change    Pt alert and oriented. CMS/neuros intact. Denies SOB, chest pain, headache, nausea/vomiting.  LS diminished. BS present and audible in all quadrants. Passing flatus.  Administered 1L of oxygen via nasal cannula overnight. Sats = 93%.   Tolerating regular diet. Sputum sample collected and sent to lab. IV saline locked.  Ambulates with SBA and a cane. Incontinent 2X due to urgency.  Able to make needs known. Bed alarm maintained for safety. Continue POC.

## 2018-01-16 NOTE — PLAN OF CARE
Problem: Patient Care Overview  Goal: Plan of Care/Patient Progress Review  Discharge Planner OT   Patient plan for discharge: Patient reports can not afford SANDER, but has thought about group home or nursing home.  Current status: Patient scored 15/22 on MOCA Blind (score of 18 and above indicates normal cognition).  Patient completed full shower task and dressing tasks with assist needed for thoroughness with bathing and donning pants and socks over legs.  Patient appears to have difficulty with problem solving and often gives up quickly if having difficulty with physical or cognitive task.  Patient took shower on RA, after shower O2 sats 86%.  Barriers to return to prior living situation: Cognitive deficits, mental health/difficulty with coping, obesity resulting in difficulty with self-cares  Recommendations for discharge: TCU vs. Home with more assist (bathing assist, cooking, dressing)  Rationale for recommendations: Continued daily OT for maximum independence in ADLs/mobility       Entered by: Judith Mclain 01/16/2018 12:14 PM

## 2018-01-16 NOTE — CONSULTS
Social Work: Assessment with Discharge Plan    Patient Name:  Lynda Delgadillo  :  1945  Age:  72 year old  MRN:  [id number]  Risk/Complexity Score:   8  Completed assessment with:  Pt, Dr Jung, DESTINEY Mckeon, 10A Nursing staff: Lidya GUAJARDO and Bedside RN Brianda    Presenting Information   Reason for Referral:  Discharge plan  Date of Intake:  2018  Referral Source:  Chart Review  Decision Maker:  pt  Alternate Decision Maker:  Not identified    Living Situation:  Apartment  Previous Functional Status:  Assistance with Other:  pt has ILS worker, homemaking, could have PCA, CADI DM Luis Lundy [phone]  Patient and family understanding of hospitalization:  Seeking treatment for pneumonia  Cultural/Language/Spiritual Considerations:  Pt has chronic and persistent mental health. Last behavioral health   Adjustment to Illness:  Pt self advocates. She is aware of resources available to meet her needs and manage her mental health. After thinking about TCU/Rehab stay, pt expresses preference to DC home w/resumption of services. Luis supports pt's autonomy and supports DC home w/services    Physical Health  Reason for Admission:    1. Pneumonia of right lower lobe due to infectious organism (H)    2. Generalized muscle weakness    3. Other pneumonia, unspecified organism    4. Muscle weakness (generalized)      Services Needed/Recommended:  Other:  home v TCU. Pt prefers DC Home, no TCU referrals made today. DESTINEY Mckeon informed    Mental Health/Chemical Dependency  Diagnosis:  Schizophrenia  Support/Services in Place:  , ILS worker, Homemaking, Incontinent supplies through [employer]  Services Needed/Recommended:  None noted    Support System  Significant relationship at present time:  Pt identifies having friends and sister involved and supportive  Family of origin is available for support:  Yes-via telephone  Other support available:  friends  Gaps in support system:  None  noted  Patient is caregiver to:  None     Provider Information   Primary Care Physician:  Owen Jurado   [phone]   Clinic:  [facility] [address] [facility]   / [address]      :      Financial   Income Source:  Disability, pension  Financial Concerns:   None noted  Insurance:    Payor/Plan Subscriber Name Rel Member # Group #   MEDICA - MEDICA DUAL * DANIEL WYMAN  [id number]      [address] [address]       Discharge Plan   Patient and family discharge goal:  Return home w/resumption of services   Provided education on discharge plan:  YES  Patient agreeable to discharge plan:  YES  A list of Medicare Certified Facilities was provided to the patient and/or family to encourage patient choice. Patient's choices for facility are:  NA-pt declines  Will NH provide Skilled rehabilitation or complex medical:  NA  General information regarding anticipated insurance coverage and possible out of pocket cost was discussed. Patient and patient's family are aware patient may incur the cost of transportation to the facility, pending insurance payment: YES-pt is interested in pursuing [employer] for support with groceries  Barriers to discharge:  Medical stability    Discharge Recommendations   Anticipated Disposition:  Home with services  Transportation Needs:  Cab:  pt has [place] Benefits through insurance for cab ride home  Name of Transportation Company and Phone:  Through [employer]    Additional comments   Writer introduced role/reason for visit. Pt was receptive; per nursing staff she has expressed anxiety/concern about her homecare and home making staff being aware pt is hospitalized and not available to meet their appointments.  Per Luis, he has contacted Elaina (ILS worker) and will contact homemaking agency. He states pt is eligible for increased services than what she is currently receiving and may increase services for pt to return. He is supportive of pt's plan for DC home  "or DC to TCU. Luis states historically pt has not had positive experiences with TCU stays.     Writer and pt discussed DC options, Pt says she has \"so many things to do at home\" that she really cannot do a short term rehab stay. She verbalized feeling reassured by writer's conversation with her [facility] GAEL Smith and is willing to wait in hospital until MDs assesses her to be medically stable for DC. She shared that she has obligations at [facility] and friends she needs to attend to. Pt had visitor earlier today, she shared with writer this was \"a real day brightener\".  SW updated Alexandra REILLY and RNZION Mckeon of stated plan to return home.  SW to remain available per request/referral.    Rohit Lundy, [facility] DASH would like to be notified of pt's DC from hospital [phone]  "

## 2018-01-17 ENCOUNTER — APPOINTMENT (OUTPATIENT)
Dept: OCCUPATIONAL THERAPY | Facility: CLINIC | Age: 73
DRG: 194 | End: 2018-01-17
Payer: COMMERCIAL

## 2018-01-17 VITALS
SYSTOLIC BLOOD PRESSURE: 133 MMHG | OXYGEN SATURATION: 96 % | TEMPERATURE: 96.3 F | HEART RATE: 87 BPM | WEIGHT: 261.2 LBS | DIASTOLIC BLOOD PRESSURE: 83 MMHG | BODY MASS INDEX: 39.72 KG/M2 | RESPIRATION RATE: 16 BRPM

## 2018-01-17 LAB
CREAT SERPL-MCNC: 0.74 MG/DL (ref 0.52–1.04)
CRP SERPL-MCNC: 107 MG/L (ref 0–8)
GFR SERPL CREATININE-BSD FRML MDRD: 78 ML/MIN/1.7M2
PLATELET # BLD AUTO: 285 10E9/L (ref 150–450)

## 2018-01-17 PROCEDURE — 25000132 ZZH RX MED GY IP 250 OP 250 PS 637: Performed by: INTERNAL MEDICINE

## 2018-01-17 PROCEDURE — 40000133 ZZH STATISTIC OT WARD VISIT

## 2018-01-17 PROCEDURE — 85049 AUTOMATED PLATELET COUNT: CPT | Performed by: NURSE PRACTITIONER

## 2018-01-17 PROCEDURE — 82565 ASSAY OF CREATININE: CPT | Performed by: NURSE PRACTITIONER

## 2018-01-17 PROCEDURE — 25000132 ZZH RX MED GY IP 250 OP 250 PS 637: Performed by: NURSE PRACTITIONER

## 2018-01-17 PROCEDURE — 99239 HOSP IP/OBS DSCHRG MGMT >30: CPT | Performed by: INTERNAL MEDICINE

## 2018-01-17 PROCEDURE — 86140 C-REACTIVE PROTEIN: CPT | Performed by: NURSE PRACTITIONER

## 2018-01-17 PROCEDURE — 36415 COLL VENOUS BLD VENIPUNCTURE: CPT | Performed by: NURSE PRACTITIONER

## 2018-01-17 PROCEDURE — 25000125 ZZHC RX 250: Performed by: NURSE PRACTITIONER

## 2018-01-17 PROCEDURE — 97535 SELF CARE MNGMENT TRAINING: CPT | Mod: GO

## 2018-01-17 PROCEDURE — 94640 AIRWAY INHALATION TREATMENT: CPT

## 2018-01-17 RX ORDER — CEFDINIR 300 MG/1
300 CAPSULE ORAL EVERY 12 HOURS SCHEDULED
Status: DISCONTINUED | OUTPATIENT
Start: 2018-01-17 | End: 2018-01-17 | Stop reason: HOSPADM

## 2018-01-17 RX ORDER — GUAIFENESIN 600 MG/1
600 TABLET, EXTENDED RELEASE ORAL 2 TIMES DAILY
Qty: 28 TABLET | Refills: 0 | Status: ON HOLD | OUTPATIENT
Start: 2018-01-17 | End: 2018-01-27

## 2018-01-17 RX ORDER — CEFPODOXIME PROXETIL 200 MG/1
400 TABLET, FILM COATED ORAL 2 TIMES DAILY
Status: DISCONTINUED | OUTPATIENT
Start: 2018-01-17 | End: 2018-01-17

## 2018-01-17 RX ORDER — ARIPIPRAZOLE 5 MG/1
25 TABLET ORAL EVERY MORNING
Qty: 30 TABLET | Refills: 1 | Status: ON HOLD
Start: 2018-01-18 | End: 2018-01-27

## 2018-01-17 RX ORDER — CEFDINIR 300 MG/1
300 CAPSULE ORAL EVERY 12 HOURS
Qty: 8 CAPSULE | Refills: 0 | Status: ON HOLD | OUTPATIENT
Start: 2018-01-17 | End: 2018-01-27

## 2018-01-17 RX ORDER — CEFPODOXIME PROXETIL 200 MG/1
200 TABLET, FILM COATED ORAL 2 TIMES DAILY
Status: DISCONTINUED | OUTPATIENT
Start: 2018-01-17 | End: 2018-01-17 | Stop reason: CLARIF

## 2018-01-17 RX ORDER — TOLTERODINE 2 MG/1
2 CAPSULE, EXTENDED RELEASE ORAL DAILY
Qty: 30 CAPSULE | Refills: 0
Start: 2018-01-18

## 2018-01-17 RX ADMIN — DULOXETINE HYDROCHLORIDE 60 MG: 60 CAPSULE, DELAYED RELEASE ORAL at 08:19

## 2018-01-17 RX ADMIN — GUAIFENESIN 600 MG: 600 TABLET, EXTENDED RELEASE ORAL at 08:19

## 2018-01-17 RX ADMIN — IPRATROPIUM BROMIDE AND ALBUTEROL SULFATE 3 ML: .5; 3 SOLUTION RESPIRATORY (INHALATION) at 08:04

## 2018-01-17 RX ADMIN — CEFDINIR 300 MG: 300 CAPSULE ORAL at 08:25

## 2018-01-17 RX ADMIN — TOLTERODINE TARTRATE 2 MG: 2 CAPSULE, EXTENDED RELEASE ORAL at 08:19

## 2018-01-17 RX ADMIN — ARIPIPRAZOLE 25 MG: 5 TABLET ORAL at 08:19

## 2018-01-17 RX ADMIN — BUPROPION HYDROCHLORIDE 450 MG: 300 TABLET, FILM COATED, EXTENDED RELEASE ORAL at 08:19

## 2018-01-17 ASSESSMENT — ACTIVITIES OF DAILY LIVING (ADL)
ADLS_ACUITY_SCORE: 18

## 2018-01-17 NOTE — PROGRESS NOTES
"SPIRITUAL HEALTH SERVICES  SPIRITUAL ASSESSMENT Progress Note  [facility] [place] [place] [facility]     REFERRAL SOURCE: Hospital  Request    Met with Lynda to offer spiritual and emotional support.  Lynda was just leaving but she said \"this is perfect timing to talk about the transition home.\"  Lynda told me of her experiences with \"depression and bipolar disorder\" and her \"need to look upon family for support at home.\" Lynda said her family is \"very supportive.\"  At the end of our visit we prayed for \"peace and comfort along the journey.\"     PLAN: no plans to follow up.  Spiritual health remains available.     Nathalie Leahy  Chaplain Resident  Pager [phone]    "

## 2018-01-17 NOTE — PLAN OF CARE
Problem: Patient Care Overview  Goal: Plan of Care/Patient Progress Review  Discharge Planner OT   Patient plan for discharge: home with increased assist for bathing  Current status: Patient sitting up on EOB upon arrival. Discussed dc plan, patient plans to dc home today, she would like a home health aide to assist her with bathing 1x/week, discussed home OT eval as well and patient receptive. Patient trialed a sock aide for increased ease with donning socks, she needed some vc's for problem solving with proper use of sock aide, but able to complete and she found it helpful. Patient ordered a sock aide for home. Patient sets up own meds at home, uses pill boxes, reports she is occasionally forgetful in taking her meds, therapist suggested use of alarm clock or post it note reminders as strategies to ensure she is taking meds.   Barriers to return to prior living situation: Cognition/mental health/difficulty with coping, obesity resulting in difficulty with self-cares  Recommendations for discharge: home with home OT safety eval and home health aide  Rationale for recommendations: Patient would benefit from home therapy evals for safety and a home health aide to assist with bathing tasks.        Entered by: WINNIE MASON 01/17/2018 10:40 AM      Occupational Therapy Discharge Summary    Reason for therapy discharge:    Discharged to home with home therapy.    Progress towards therapy goal(s). See goals on Care Plan in [facility] electronic health record for goal details.  Goals partially met.  Barriers to achieving goals:   discharge from facility.    Therapy recommendation(s):    Home OT safety eval, home health aide

## 2018-01-17 NOTE — DISCHARGE SUMMARY
Discharge Summary    Lynda Delgadillo MRN# [id number]   Age: 72 year old YOB: 1945     Date of Admission:  1/14/2018  Date of Discharge::  1/17/2018  Admitting Physician:  Bang Watkins MD  Discharge Physician:         Dr Kirill Jung MD   Primary Physician: Owen Jurado  Transferring Facility: [facility]            Discharge Diagnosis:   Principle diagnosis:   Rt lunch community acquired bacterial pneumonia   Secondary diagnoses:  Bipolar disorder, schizophrenia  Anemia  Urinary Incontinence  Lymphedema  Moderate malnutrition           Procedures:   No procedures performed during this admission         Allergies:      Allergies   Allergen Reactions     Dilaudid Cough Other (See Comments)     Psychosis and agitation     Haldol Swelling     Tongue swelling     Lactose GI Disturbance     Morphine      I have a anger reaction.     Neurontin [Gabapentin] Unknown     Patient states she just refuses to take it after reading side effect profile     Penicillins Hives               Discharge Medications:     Current Discharge Medication List      START taking these medications    Details   guaiFENesin (MUCINEX) 600 MG 12 hr tablet Take 1 tablet (600 mg) by mouth 2 times daily  Qty: 28 tablet, Refills: 0    Associated Diagnoses: Pneumonia of right lower lobe due to infectious organism (H)      tolterodine (DETROL LA) 2 MG 24 hr capsule Take 1 capsule (2 mg) by mouth daily  Qty: 30 capsule, Refills: 0    Associated Diagnoses: Stress incontinence of urine      cefdinir (OMNICEF) 300 MG capsule Take 1 capsule (300 mg) by mouth every 12 hours  Qty: 8 capsule, Refills: 0    Associated Diagnoses: Pneumonia of right lower lobe due to infectious organism (H)         CONTINUE these medications which have CHANGED    Details   ARIPiprazole (ABILIFY) 5 MG tablet Take 5 tablets (25 mg) by mouth every morning  Qty: 30 tablet, Refills: 1    Associated  Diagnoses: Schizoaffective disorder, bipolar type (H)         CONTINUE these medications which have NOT CHANGED    Details   !! buPROPion (WELLBUTRIN XL) 150 MG 24 hr tablet Take 150 mg by mouth every morning      !! buPROPion (WELLBUTRIN XL) 300 MG 24 hr tablet Take 300 mg by mouth every morning      divalproex (DEPAKOTE ER) 500 MG 24 hr tablet Take 1,500 mg by mouth At Bedtime      DULoxetine (CYMBALTA) 60 MG EC capsule Take 60 mg by mouth daily      LORazepam (ATIVAN) 0.5 MG tablet Take 1 mg by mouth At Bedtime       !! - Potential duplicate medications found. Please discuss with provider.                Consultations:   No consultations were requested during this admission          Brief History of Presenting Illness:   72 year old female presented with cough and generalized weakness for last one week.  Please see detailed H&P by Ms Millie Puente NP for further details           Hospital Course:   Lynda Delgadillo is a 72 year old female with a history of chronic lymphedema, urinary incontinence, tobacco use (in remission), bipolar disorder, schizophrenia, and anxiety admitted complaints of one month cough and weakness, worsening over the last week.      # Rt.lung community acquire pneumonia    possible bacterial  Pneumonia     - Low grade temp to 99.  CXR on admission w/ new patchy airspace opacities in RLL c/f PNA, small R pleural effusion, no cardiomegaly.  .  Rapid flu neg.   Blood cx NGTD.  UA neg. No leukocytosis.   Started on Ceftriaxone( 3 days )  and Azithromycin  ( 3 days )  Sputum cx pending with no growth so far   - CRP moderately high despite significant clinical improvement   - Mucinex 600mg BID  - Scheduled DuoNebs QID, but does not need this at discharge as patient does not have wheezing   - IS Q2H while awake , Off Oxygen as of this morning .  - PT/OT   - Patient to discharge on Cefdinir BID for a total of 7 days of Abx       # Bipolar disorder, schizophrenia, anxiety -   Mood stable.  On  Abilify 20mg PO daily, Wellbutrin 450mg PO daily, Depakote 1500mg PO daily, Cymbalta 60mg PO daily PTA.  - Continue PTA meds        # Anemia -   Hgb 11.6, likely 2/2 acute illness.  BL closer to ~ 14 per chart review. No s/s of acute bleed.   - Iron studies showed anemia of chronic disease      # Urinary incontinence -   Continue PTA Detrol, U/A negative for UTI       # Lymphedema, chronic - Stable.    - Lymphedema consult placed  - Keep LUEs elevated    - mild erythema on left LE -  Does not appears cellulitis       # Nutrition - Protein 6.6, Albumin 2.4 this admission.   - Nutrition consult placed  - Snacks and supplements between meals                   PROGRESS ON DISCHARGE DAY   Feels well today  Refuses to go to a TCU and wants to go home  Denies fever or chills  Denies SOB   Cough much better     /83 (BP Location: Right arm)  Pulse 87  Temp 96.3  F (35.7  C) (Oral)  Resp 16  Wt 118.5 kg (261 lb 3.2 oz)  SpO2 96%  Breastfeeding? No  BMI 39.72 kg/m2    CVS: Normal Heart sounds   RS:Coarse  breath sounds bilaterally. No wheeze   Abdomen: Soft and non tender. Normal bowel sounds.   Neuro: Alert and orientated X 3          Pending Tests at Discharge:     Unresulted Labs Ordered in the Past 30 Days of this Admission     Date and Time Order Name Status Description    1/14/2018 1353 Sputum Culture Aerobic Bacterial In process     1/14/2018 1114 Blood culture ONE site Preliminary                  Discharge instructions and Follow up:       Discharge Procedure Orders  Home care nursing referral   Referral Type: Home Health Therapies & Aides     Home Care OT Referral for Hospital Discharge     MD face to face encounter   Order Comments: Documentation of Face to Face and Certification for Home Health Services    I certify that patient: Lynda Delgadillo is under my care and that I, or a nurse practitioner or physician's assistant working with me, had a face-to-face encounter that meets the physician  face-to-face encounter requirements with this patient on: January 17, 2018.    This encounter with the patient was in whole, or in part, for the following medical condition, which is the primary reason for home health care: pneumonia, generalized muscle weakness, bipolar disorder.    I certify that, based on my findings, the following services are medically necessary home health services: Nursing and Occupational Therapy.    My clinical findings support the need for the above services because: Nurse is needed: To provide assessment and oversight required in the home to assure adherence to the medical plan due to: recent hospitalization, review of medications in lieu of mental health history and cognitive deficits and Occupational Therapy Services are needed to assess and treat cognitive ability and address ADL safety due to impairment in cognition assessment, home safety evaluation post-hospitalization.    Further, I certify that my clinical findings support that this patient is homebound (i.e. absences from home require considerable and taxing effort and are for medical reasons or Baptism services or infrequently or of short duration when for other reasons) because: Mental health symptoms including: Mental health condition is exacerbated by exposure to crowds, unfamiliar environment or new stressful situations.. and Requires assistance of another person or specialized equipment to access medical services because patient: Range of motion limitations prevents ability to exit home safely...    Based on the above findings. I certify that this patient is confined to the home and needs intermittent skilled nursing care, physical therapy and/or speech therapy.  The patient is under my care, and I have initiated the establishment of the plan of care.  This patient will be followed by a physician who will periodically review the plan of care.  Physician/Provider to provide follow up care: Owen Jurado  M    Attending hospital physician (the Medicare certified [facility] provider): Thomas Adams MD  Physician Signature: See electronic signature associated with these discharge orders.  Date: 1/17/2018     Reason for your hospital stay   Order Comments: Pneumonia     Adult [facility] Follow-up and recommended labs and tests   Order Comments: Please follow up with your PCP as a follow up visit in 1-2 weeks   Appointments on [place] and/or [facility] (with [facility] or [facility] provider or service). Call [phone] if you haven't heard regarding these appointments within 7 days of discharge.     Activity   Order Comments: Your activity upon discharge: activity as tolerated   Order Specific Question Answer Comments   Is discharge order? Yes      When to contact your care team   Order Comments: Please seek mediacl attention if you develop more cough, fever or chills     Full Code     Diet   Order Comments: Follow this diet upon discharge: Orders Placed This Encounter     Snacks/Supplements Adult: Ensure Clear; Between Meals     Snacks/Supplements Adult: Ensure Plus Adult Shake; Between Meals     Regular Diet Adult   Order Specific Question Answer Comments   Is discharge order? Yes                Discharge Disposition:   Discharged to home           Time spent : 35  mts total with patient and coordination of care       Dr LATA Jung MD  Hospitalist ( Internal medicine)  Pager: [phone]

## 2018-01-17 NOTE — PLAN OF CARE
Problem: Patient Care Overview  Goal: Plan of Care/Patient Progress Review  Outcome: Improving        VS:   VSS, denies SOB/CP. LS diminished. Continuous pulse ox patent. 2L NC to mainintain O2 >92%.    Output:   Voiding w/o difficulty- incontinence x1. Loose stool this evening, held stool softner +flatus   Activity:   SBA+ cane, independently repositions self in bed. Sat in chair during meals.   Skin: Pale, Bruise(s) present.   Pain:   Pt denies any pain.   Neuro/CMS:   Intact, denies numbness or tingling. Slight tremor present (baseline).   Dressing(s):   None present.   Diet:   Regular diet tolerated   LDA:   Right IV SL   Equipment:   Cane   Plan:   Pt will return home with in home services   Additional Info:   Pt is able to make needs known. Uses call light approprietly.

## 2018-01-17 NOTE — PROGRESS NOTES
Care Coordinator Progress Note     Admission Date/Time:  1/14/2018  Attending MD:  Bang Watkins MD     Data  Chart reviewed, discussed with interdisciplinary team.   Patient was admitted for:    Pneumonia of right lower lobe due to infectious organism (H)  Generalized muscle weakness  Other pneumonia, unspecified organism  Muscle weakness (generalized)  Schizoaffective disorder, bipolar type (H)  Stress incontinence of urine.    Concerns with insurance coverage for discharge needs: None.  Current Living Situation: Patient lives alone.  Support System: Supportive and Involved  Services Involved: Housekeeping/ Chore Agency and CADI :  Rohit Lundy [phone]  Transportation: [place] transportation,  [employer] [facility] [phone]  Barriers to Discharge: none    Coordination of Care and Referrals: Provided patient/family with options for Home Care.  Patient agreeable to home care referral for home RN, OT and HHA.   Patient's first choice was [facility] - they are not currently accepting new referrals for RN until next week.   Second choice was [facility] - they do not have home OT evals until middle of next week  Third choice was [facility]- referral initiated and orders reflected on AVS     Assessment  Patient with complex medical history admitted with pneumonia and UTI. Per Dr. Jung, patient is stable to discharge home today. OT is recommending home OT eval as well as home RN eval to review medications, and patient would like HHA visit to help with bathing while she is recovering and regaining strength. Met with patient to review plan of care and discharge planning. With her permission, I called her CADI worker Luis Lundy and left voicemail with d/c update. Per YESICA Miller note from 1/16, Luis was going to update patient's ILS worker and take care of resuming patient's homemaking services and meal delivery support. Patient needing assist with arranging ride  home. She is familiar with and has used [employer] [facility]. They cannot schedule same day rather request we call once patient is full discharged and ready for . Awaiting prescriptions from discharge pharmacy and then staff can call for ride.     Plan  Anticipated Discharge Date:  today  Anticipated Discharge Plan:  Home with home care    Elaina Munoz, RN  Care Coordinator  Pager [phone]

## 2018-01-17 NOTE — PLAN OF CARE
Problem: Patient Care Overview  Goal: Plan of Care/Patient Progress Review  Pt approached pt for therapy but pt had just received lunch tray. When PT returned pt had been discharged, She was at A for gait of >500' yesterday. Pt is encouraged to walk hallways of her building for at least a total of 30minutes daily starting with 6-10min bouts of walking.  Pt agreeable, she reports that she would like to walk with PT but needs to make sure she eats and gets dressed before her discharge.  Physical Therapy Discharge Summary    Reason for therapy discharge:    Discharged to home.    Progress towards therapy goal(s). See goals on Care Plan in [facility] electronic health record for goal details.  Goals partially met.  Barriers to achieving goals:   discharge from facility.    Therapy recommendation(s):    Continue home exercise program.

## 2018-01-17 NOTE — PLAN OF CARE
Problem: Patient Care Overview  Goal: Plan of Care/Patient Progress Review  Outcome: Improving    Pt alert and oriented x3. CMS/neuros at baseline; numbness and tingling in right fingers. Pt tremulous, states it's from years of taking psych meds.  LS diminished in RLL. Attempted IS with 6-7 repetitions. Tolerated this fairly. Needs more reinforcement. Maintained on 1.5L of O2 overnight with sats between 93-95%. Trialed pt on room air at 05:50am with sats between 91% - 93% but didn't maintain levels for long. Dropped to 86% then administered 1L of O2.  Incontinent of urine x1 due to urgency. Ambulates to bathroom with an assist of 1 and a cane.  Able to make needs known. Pt anticipating D/C home with services. Continue POC.

## 2018-01-17 NOTE — PLAN OF CARE
Problem: Patient Care Overview  Goal: Plan of Care/Patient Progress Review  Outcome: Adequate for Discharge Date Met: 01/17/18  Pt up independently in room. Denies pain. Incontinent at times but better after getting AM detrol. Wearing brief. CMS at baseline. LS clear. Pt on room air. Tolerating regular diet. PIV removed. Pt ready to discharge home with homecare and home OT. Writer reviewed all paperwork and meds. Pt given d/c meds. Discharging home with [employer] ride.

## 2018-01-18 LAB
BACTERIA SPEC CULT: NORMAL
SPECIMEN SOURCE: NORMAL

## 2018-01-20 LAB
BACTERIA SPEC CULT: NO GROWTH
Lab: NORMAL
SPECIMEN SOURCE: NORMAL

## 2018-01-27 ENCOUNTER — HOSPITAL ENCOUNTER (INPATIENT)
Facility: CLINIC | Age: 73
LOS: 2 days | Discharge: NURSING, REHAB OR LONG-TERM CARE | DRG: 556 | End: 2018-01-29
Attending: EMERGENCY MEDICINE | Admitting: INTERNAL MEDICINE
Payer: COMMERCIAL

## 2018-01-27 ENCOUNTER — APPOINTMENT (OUTPATIENT)
Dept: CT IMAGING | Facility: CLINIC | Age: 73
DRG: 556 | End: 2018-01-27
Attending: EMERGENCY MEDICINE
Payer: COMMERCIAL

## 2018-01-27 ENCOUNTER — APPOINTMENT (OUTPATIENT)
Dept: GENERAL RADIOLOGY | Facility: CLINIC | Age: 73
DRG: 556 | End: 2018-01-27
Attending: EMERGENCY MEDICINE
Payer: COMMERCIAL

## 2018-01-27 DIAGNOSIS — Z91.81 PERSONAL HISTORY OF FALL: ICD-10-CM

## 2018-01-27 DIAGNOSIS — R53.1 WEAKNESS: ICD-10-CM
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
## 2018-01-27 LAB
ALBUMIN SERPL-MCNC: 2.4 G/DL (ref 3.4–5)
ALBUMIN UR-MCNC: 10 MG/DL
ALP SERPL-CCNC: 56 U/L (ref 40–150)
ALT SERPL W P-5'-P-CCNC: 10 U/L (ref 0–50)
ANION GAP SERPL CALCULATED.3IONS-SCNC: 10 MMOL/L (ref 3–14)
APPEARANCE UR: ABNORMAL
AST SERPL W P-5'-P-CCNC: 16 U/L (ref 0–45)
BASOPHILS # BLD AUTO: 0 10E9/L (ref 0–0.2)
BASOPHILS NFR BLD AUTO: 0.1 %
BILIRUB SERPL-MCNC: 0.4 MG/DL (ref 0.2–1.3)
BILIRUB UR QL STRIP: NEGATIVE
BUN SERPL-MCNC: 18 MG/DL (ref 7–30)
CALCIUM SERPL-MCNC: 8.7 MG/DL (ref 8.5–10.1)
CHLORIDE SERPL-SCNC: 105 MMOL/L (ref 94–109)
CK SERPL-CCNC: 223 U/L (ref 30–225)
CO2 SERPL-SCNC: 28 MMOL/L (ref 20–32)
COLOR UR AUTO: YELLOW
CREAT SERPL-MCNC: 0.78 MG/DL (ref 0.52–1.04)
CREAT SERPL-MCNC: 0.8 MG/DL (ref 0.52–1.04)
DIFFERENTIAL METHOD BLD: ABNORMAL
EOSINOPHIL # BLD AUTO: 0.2 10E9/L (ref 0–0.7)
EOSINOPHIL NFR BLD AUTO: 1.5 %
ERYTHROCYTE [DISTWIDTH] IN BLOOD BY AUTOMATED COUNT: 13.5 % (ref 10–15)
FLUAV+FLUBV AG SPEC QL: NEGATIVE
FLUAV+FLUBV AG SPEC QL: NEGATIVE
GFR SERPL CREATININE-BSD FRML MDRD: 70 ML/MIN/1.7M2
GFR SERPL CREATININE-BSD FRML MDRD: 73 ML/MIN/1.7M2
GLUCOSE SERPL-MCNC: 93 MG/DL (ref 70–99)
GLUCOSE UR STRIP-MCNC: NEGATIVE MG/DL
HCT VFR BLD AUTO: 36.4 % (ref 35–47)
HGB BLD-MCNC: 10.9 G/DL (ref 11.7–15.7)
HGB UR QL STRIP: NEGATIVE
IMM GRANULOCYTES # BLD: 0.1 10E9/L (ref 0–0.4)
IMM GRANULOCYTES NFR BLD: 1.2 %
INR PPP: 1.12 (ref 0.86–1.14)
INTERPRETATION ECG - MUSE: NORMAL
KETONES UR STRIP-MCNC: 10 MG/DL
LACTATE BLD-SCNC: 0.9 MMOL/L (ref 0.7–2)
LEUKOCYTE ESTERASE UR QL STRIP: ABNORMAL
LIPASE SERPL-CCNC: 84 U/L (ref 73–393)
LYMPHOCYTES # BLD AUTO: 1.5 10E9/L (ref 0.8–5.3)
LYMPHOCYTES NFR BLD AUTO: 14.5 %
MCH RBC QN AUTO: 29.8 PG (ref 26.5–33)
MCHC RBC AUTO-ENTMCNC: 29.9 G/DL (ref 31.5–36.5)
MCV RBC AUTO: 100 FL (ref 78–100)
MONOCYTES # BLD AUTO: 1.3 10E9/L (ref 0–1.3)
MONOCYTES NFR BLD AUTO: 12.2 %
MUCOUS THREADS #/AREA URNS LPF: PRESENT /LPF
NEUTROPHILS # BLD AUTO: 7.3 10E9/L (ref 1.6–8.3)
NEUTROPHILS NFR BLD AUTO: 70.5 %
NITRATE UR QL: NEGATIVE
NRBC # BLD AUTO: 0 10*3/UL
NRBC BLD AUTO-RTO: 0 /100
PH UR STRIP: 5.5 PH (ref 5–7)
PLATELET # BLD AUTO: 283 10E9/L (ref 150–450)
PLATELET # BLD AUTO: 305 10E9/L (ref 150–450)
POTASSIUM SERPL-SCNC: 3.8 MMOL/L (ref 3.4–5.3)
PROCALCITONIN SERPL-MCNC: <0.05 NG/ML
PROT SERPL-MCNC: 6.5 G/DL (ref 6.8–8.8)
RBC # BLD AUTO: 3.66 10E12/L (ref 3.8–5.2)
RBC #/AREA URNS AUTO: 1 /HPF (ref 0–2)
SODIUM SERPL-SCNC: 144 MMOL/L (ref 133–144)
SOURCE: ABNORMAL
SP GR UR STRIP: 1.02 (ref 1–1.03)
SPECIMEN SOURCE: NORMAL
SQUAMOUS #/AREA URNS AUTO: 2 /HPF (ref 0–1)
TRANS CELLS #/AREA URNS HPF: <1 /HPF (ref 0–1)
TROPONIN I SERPL-MCNC: <0.015 UG/L (ref 0–0.04)
UROBILINOGEN UR STRIP-MCNC: 2 MG/DL (ref 0–2)
WBC # BLD AUTO: 10.4 10E9/L (ref 4–11)
WBC #/AREA URNS AUTO: 2 /HPF (ref 0–2)

## 2018-01-27 PROCEDURE — 84145 PROCALCITONIN (PCT): CPT | Performed by: INTERNAL MEDICINE

## 2018-01-27 PROCEDURE — 99223 1ST HOSP IP/OBS HIGH 75: CPT | Mod: AI | Performed by: INTERNAL MEDICINE

## 2018-01-27 PROCEDURE — 85049 AUTOMATED PLATELET COUNT: CPT | Performed by: INTERNAL MEDICINE

## 2018-01-27 PROCEDURE — 25000132 ZZH RX MED GY IP 250 OP 250 PS 637: Performed by: INTERNAL MEDICINE

## 2018-01-27 PROCEDURE — 87804 INFLUENZA ASSAY W/OPTIC: CPT | Performed by: EMERGENCY MEDICINE

## 2018-01-27 PROCEDURE — 84484 ASSAY OF TROPONIN QUANT: CPT | Performed by: EMERGENCY MEDICINE

## 2018-01-27 PROCEDURE — 36415 COLL VENOUS BLD VENIPUNCTURE: CPT | Performed by: INTERNAL MEDICINE

## 2018-01-27 PROCEDURE — 99285 EMERGENCY DEPT VISIT HI MDM: CPT | Mod: 25 | Performed by: EMERGENCY MEDICINE

## 2018-01-27 PROCEDURE — 96360 HYDRATION IV INFUSION INIT: CPT | Performed by: EMERGENCY MEDICINE

## 2018-01-27 PROCEDURE — 93010 ELECTROCARDIOGRAM REPORT: CPT | Mod: Z6 | Performed by: EMERGENCY MEDICINE

## 2018-01-27 PROCEDURE — 83605 ASSAY OF LACTIC ACID: CPT | Performed by: EMERGENCY MEDICINE

## 2018-01-27 PROCEDURE — 93005 ELECTROCARDIOGRAM TRACING: CPT | Performed by: EMERGENCY MEDICINE

## 2018-01-27 PROCEDURE — 85025 COMPLETE CBC W/AUTO DIFF WBC: CPT | Performed by: EMERGENCY MEDICINE

## 2018-01-27 PROCEDURE — 80053 COMPREHEN METABOLIC PANEL: CPT | Performed by: EMERGENCY MEDICINE

## 2018-01-27 PROCEDURE — 70450 CT HEAD/BRAIN W/O DYE: CPT

## 2018-01-27 PROCEDURE — 81001 URINALYSIS AUTO W/SCOPE: CPT | Performed by: INTERNAL MEDICINE

## 2018-01-27 PROCEDURE — 83690 ASSAY OF LIPASE: CPT | Performed by: EMERGENCY MEDICINE

## 2018-01-27 PROCEDURE — 71046 X-RAY EXAM CHEST 2 VIEWS: CPT

## 2018-01-27 PROCEDURE — 25000128 H RX IP 250 OP 636: Performed by: EMERGENCY MEDICINE

## 2018-01-27 PROCEDURE — 25000128 H RX IP 250 OP 636: Performed by: INTERNAL MEDICINE

## 2018-01-27 PROCEDURE — 82565 ASSAY OF CREATININE: CPT | Performed by: INTERNAL MEDICINE

## 2018-01-27 PROCEDURE — 12000008 ZZH R&B INTERMEDIATE UMMC

## 2018-01-27 PROCEDURE — 82550 ASSAY OF CK (CPK): CPT | Performed by: EMERGENCY MEDICINE

## 2018-01-27 PROCEDURE — 85610 PROTHROMBIN TIME: CPT | Performed by: EMERGENCY MEDICINE

## 2018-01-27 PROCEDURE — 96361 HYDRATE IV INFUSION ADD-ON: CPT | Performed by: EMERGENCY MEDICINE

## 2018-01-27 RX ORDER — ARIPIPRAZOLE 20 MG/1
20 TABLET ORAL DAILY
Status: DISCONTINUED | OUTPATIENT
Start: 2018-01-28 | End: 2018-01-29 | Stop reason: HOSPADM

## 2018-01-27 RX ORDER — BISACODYL 10 MG
10 SUPPOSITORY, RECTAL RECTAL DAILY PRN
Status: DISCONTINUED | OUTPATIENT
Start: 2018-01-27 | End: 2018-01-29 | Stop reason: HOSPADM

## 2018-01-27 RX ORDER — BUPROPION HYDROCHLORIDE 150 MG/1
150 TABLET ORAL EVERY MORNING
Status: DISCONTINUED | OUTPATIENT
Start: 2018-01-28 | End: 2018-01-29 | Stop reason: HOSPADM

## 2018-01-27 RX ORDER — AMOXICILLIN 250 MG
2 CAPSULE ORAL 2 TIMES DAILY PRN
Status: DISCONTINUED | OUTPATIENT
Start: 2018-01-27 | End: 2018-01-29 | Stop reason: HOSPADM

## 2018-01-27 RX ORDER — TOLTERODINE 2 MG/1
2 CAPSULE, EXTENDED RELEASE ORAL DAILY
Status: DISCONTINUED | OUTPATIENT
Start: 2018-01-27 | End: 2018-01-29 | Stop reason: HOSPADM

## 2018-01-27 RX ORDER — POLYETHYLENE GLYCOL 3350 17 G/17G
17 POWDER, FOR SOLUTION ORAL DAILY PRN
Status: DISCONTINUED | OUTPATIENT
Start: 2018-01-27 | End: 2018-01-29 | Stop reason: HOSPADM

## 2018-01-27 RX ORDER — ONDANSETRON 4 MG/1
4 TABLET, ORALLY DISINTEGRATING ORAL EVERY 6 HOURS PRN
Status: DISCONTINUED | OUTPATIENT
Start: 2018-01-27 | End: 2018-01-29 | Stop reason: HOSPADM

## 2018-01-27 RX ORDER — ACETAMINOPHEN 325 MG/1
650 TABLET ORAL EVERY 4 HOURS PRN
Status: DISCONTINUED | OUTPATIENT
Start: 2018-01-27 | End: 2018-01-29 | Stop reason: HOSPADM

## 2018-01-27 RX ORDER — DULOXETIN HYDROCHLORIDE 30 MG/1
60 CAPSULE, DELAYED RELEASE ORAL DAILY
Status: DISCONTINUED | OUTPATIENT
Start: 2018-01-27 | End: 2018-01-29 | Stop reason: HOSPADM

## 2018-01-27 RX ORDER — NALOXONE HYDROCHLORIDE 0.4 MG/ML
.1-.4 INJECTION, SOLUTION INTRAMUSCULAR; INTRAVENOUS; SUBCUTANEOUS
Status: DISCONTINUED | OUTPATIENT
Start: 2018-01-27 | End: 2018-01-29 | Stop reason: HOSPADM

## 2018-01-27 RX ORDER — DIVALPROEX SODIUM 500 MG/1
1500 TABLET, EXTENDED RELEASE ORAL AT BEDTIME
Status: DISCONTINUED | OUTPATIENT
Start: 2018-01-27 | End: 2018-01-29 | Stop reason: HOSPADM

## 2018-01-27 RX ORDER — BUPROPION HYDROCHLORIDE 300 MG/1
300 TABLET ORAL EVERY MORNING
Status: DISCONTINUED | OUTPATIENT
Start: 2018-01-28 | End: 2018-01-29 | Stop reason: HOSPADM

## 2018-01-27 RX ORDER — LORAZEPAM 0.5 MG/1
0.5 TABLET ORAL AT BEDTIME
Status: DISCONTINUED | OUTPATIENT
Start: 2018-01-27 | End: 2018-01-29 | Stop reason: HOSPADM

## 2018-01-27 RX ORDER — ONDANSETRON 2 MG/ML
4 INJECTION INTRAMUSCULAR; INTRAVENOUS EVERY 6 HOURS PRN
Status: DISCONTINUED | OUTPATIENT
Start: 2018-01-27 | End: 2018-01-29 | Stop reason: HOSPADM

## 2018-01-27 RX ORDER — AMOXICILLIN 250 MG
1 CAPSULE ORAL 2 TIMES DAILY PRN
Status: DISCONTINUED | OUTPATIENT
Start: 2018-01-27 | End: 2018-01-29 | Stop reason: HOSPADM

## 2018-01-27 RX ADMIN — SODIUM CHLORIDE 1000 ML: 900 INJECTION, SOLUTION INTRAVENOUS at 10:04

## 2018-01-27 RX ADMIN — LORAZEPAM 0.5 MG: 0.5 TABLET ORAL at 22:07

## 2018-01-27 RX ADMIN — DIVALPROEX SODIUM 1500 MG: 500 TABLET, EXTENDED RELEASE ORAL at 22:07

## 2018-01-27 RX ADMIN — ENOXAPARIN SODIUM 40 MG: 40 INJECTION SUBCUTANEOUS at 15:33

## 2018-01-27 ASSESSMENT — ENCOUNTER SYMPTOMS
HEADACHES: 0
EYES NEGATIVE: 1
PSYCHIATRIC NEGATIVE: 1
FLANK PAIN: 0
SHORTNESS OF BREATH: 0
ABDOMINAL PAIN: 0
NAUSEA: 0
WEAKNESS: 1
VOMITING: 0
FEVER: 0
NECK PAIN: 0

## 2018-01-27 ASSESSMENT — ACTIVITIES OF DAILY LIVING (ADL)
TOILETING: 0-->INDEPENDENT
NUMBER_OF_TIMES_PATIENT_HAS_FALLEN_WITHIN_LAST_SIX_MONTHS: 10
SWALLOWING: 0-->SWALLOWS FOODS/LIQUIDS WITHOUT DIFFICULTY
RETIRED_COMMUNICATION: 0-->UNDERSTANDS/COMMUNICATES WITHOUT DIFFICULTY
BATHING: 0-->INDEPENDENT
AMBULATION: 0-->INDEPENDENT
RETIRED_EATING: 0-->INDEPENDENT
TRANSFERRING: 0-->INDEPENDENT
DRESS: 0-->INDEPENDENT
FALL_HISTORY_WITHIN_LAST_SIX_MONTHS: YES

## 2018-01-27 NOTE — IP AVS SNAPSHOT
` ` Patient Information     Patient Name Sex Lynda Garcia ([id number]) Female 1945       Room Bed    7238 7238-02      Patient Demographics     Address Phone    [address] [address] [phone] (Home) *Preferred*  [phone] (Mobile)      Patient Ethnicity & Race     Ethnic Group Patient Race    American White      Emergency Contact(s)     Name Relation Home Work Mobile    MARION ARAIZA Friend [phone]      Documents on File        Status Date Received Description       Documents for the Patient    Face Sheet Received () 07/09/10     Privacy Notice - [place] Received 06/10/12     Insurance Card Received 14 [employer]    External Medication Information Consent       Patient ID Received 16 mn id    Consent for Services - Hospital/Clinic Received () 11/24/10     Face Sheet Received () 10/15/10     Face Sheet Received () 11/24/10     HIM DIETER Authorization  12 [facility]    Consent for EHR Access  13 Copied from existing Consent for services - IP/ED collected on 06/10/2012    [facility] Specified Other       Privacy Notice - [place] Received 14     Consent for Services - Hospital/Clinic Received () 14     Consent for Services - Hospital/Clinic Received () 14     Consent for Services - Hospital/Clinic       Consent for EHR Access Received 14     Privacy Notice - [place] Received 14     HIM DIETER Authorization - File Only  14 DEC RELEASE OF INFORMATION    HIM DIETER Authorization - File Only  [facility]    HIM DIETER Authorization - File Only  14 DR. RICARDA LOPEZ-PARK NICOLLET    HIM DIETER Authorization - File Only  14 [facility] WORKER-TIANA    HIM DIETER Authorization - File Only  14 TYLER HAGEN    HIM DIETER Authorization - File Only  14 THERAPIST    HIM DIETER Authorization - File Only  14 [facility]    HIM  DIETER Authorization - File Only  14 DR. RICARDA GEE GEENA    HIM DIETER Authorization - File Only  14 CLIVE FINCH    HIM DIETER Authorization - File Only  14 PCP DR. HARMAN GONZALEZ    HIM DIETER Authorization - File Only  14 INSURANCE  , UB, P MAP    HIM DIETER Authorization  14 [facility]    Consent for Services/Privacy Notice - Hospital/Clinic Received () 16     Insurance Card Received 16 medicare    HIM DIETER Authorization - File Only  16 DEC RELEASE OF INFORMATION    Consent for Services/Privacy Notice - Hospital/Clinic Received 17     Care Everywhere Prospective Auth Received 18        Documents for the Encounter    CMS IM for Patient Signature Received 18     Discharge Attachment   FALL PREVENTION (ENGLISH)    Discharge Attachment  (Deleted)  WEAKNESS (UNCERTAIN CAUSE) (ENGLISH)      Admission Information     Attending Provider Admitting Provider Admission Type Admission Date/Time    Valentino Jimenez MD Andrisani, Demetrios T, MD Emergency 18  0554    Discharge Date Hospital Service Auth/Cert Status Service Area     Internal Medicine Incomplete [facility]    Unit Room/Bed Admission Status       UU U7B 7238/7238-02 Admission (Confirmed)       Admission     Complaint    Weakness      Hospital Account     Name Acct ID Class Status Primary Coverage    Lynda Delgadillo [id number] Inpatient Open MEDICA - [facility] [employer]            Guarantor Account (for Hospital Account #[id number])     Name Relation to Pt Service Area Active? Acct Type    Lynda Delgadillo Self FCS Yes Personal/Family    Address Phone          [facility] [address] [address] [phone](H)              Coverage Information (for Hospital Account #[id number])     F/O Payor/Plan Precert #    MEDICA/MEDICA D[employer] [facility]/FV PARTNERS     Subscriber Subscriber #    Lynda Delgadillo  [id number]    Address Phone    [address] [address]  [place] [address] [phone]

## 2018-01-27 NOTE — IP AVS SNAPSHOT
"    [address] [facility] BANK: [phone]                                              INTERAGENCY TRANSFER FORM - LAB / IMAGING / EKG / EMG RESULTS   2018                    Hospital Admission Date: 2018  DANIEL WYMAN   : 1945  Sex: Female        Attending Provider: Valentino Jimenez MD     Allergies:  Dilaudid Cough, Haldol, Lactose, Morphine, Neurontin [Gabapentin], Penicillins    Infection:  None   Service:  INTERNAL MED    Ht:  1.727 m (5' 8\")   Wt:  117.2 kg (258 lb 6.4 oz)   Admission Wt:  116.8 kg (257 lb 8 oz)    BMI:  39.29 kg/m 2   BSA:  2.37 m 2            Patient PCP Information     Provider PCP Type    Owen Jurado MD General         Lab Results - 3 Days      Blood Morphology Pathologist Review [[id number]]  Resulted: 18 1430, Result status: Final result    Ordering provider: Jeferson Portillo MD  18 0100 Resulting lab: [facility]    Specimen Information    Type Source Collected On   Blood  18 0723          Components       Value Reference Range Flag Lab   Copath Report --      Result:         Patient Name: DANIEL WYMAN  MR#: [id number]  Specimen #: [id number]  Collected: 2018  Received: 2018  Reported: 2018 14:28  Ordering Phy(s): JEFERSON PORTILLO    For improved result formatting, select 'View Enhanced Report Format' under   Linked Documents section.    TEST(S):  Blood Smear Morphology    FINAL DIAGNOSIS:  Peripheral Blood Smear:  -Slight normochromic, normocytic anemia; increased polychromasia; minimal   poikilocytosis    I have personally reviewed all specimens and/or slides, including the   listed special stains, and used them  with my medical judgment to determine the final diagnosis.    Electronically signed out by:    Debra Bess M.D., [facility]    Technical testing/processing performed at [place], [place]    CLINICAL HISTORY:  72-year-old " female. Peripheral smear review requested for anemia,   borderline macrocytic.    MICROSCOPIC DESCRIPTION:  The red blood cells appe ar normochromic.  Poikilocytosis is minimal.    Polychromasia is increased.  Rouleaux  formation is not increased.  The morphology of the platelets is normal.    No overtly dysplastic neutrophil  population is identified. Rare mononuclear cell worrisome but not   definitive for blast seen (versus reactive  lymphocyte). Immature granulocytes (myelocytes) seen.    SC/EC    CLINICAL LAB RESULTS:  Battery Order No. Lab Test Code Clinical Result Ref. Range Units Result   Date  Hemogram/PLT G16027 BU WBC Count 7.8 4.0-11.0 10e9/L 1/28/2018 07:39       RBC Count L 3.32 3.8-5.2 10e12/L 1/28/2018 07:39       Hemoglobin L 9.8 11.7-15.7 g/dL 1/28/2018 07:39       Hematocrit L 33.0 35.0-47.0 % 1/28/2018 07:39       MCV 99  fl 1/28/2018 07:39       MCH 29.5 26.5-33.0 pg 1/28/2018 07:39       MCHC L 29.7 31.5-36.5 g/dL 1/28/2018 07:39       RDW 13.5 10.0-15.0 % 1/28/2018 07:39       Platelet Count 290 150-450 10e9/L 1/28/2018 07:39    Iron Binding Panel   Iron L 26  ug/dL 1/28/2018 08:20        Iron Binding Cap L 187 240-430 ug/dL 1/28/2018 08:20       Iron Sat Index L 14 15-46 % 1/28/2018 08:20    Retic   Retic % H 3.4 0.5-2.0 % 1/28/2018 08:01       Retic abs H 109.9 25-95 10e9/L 1/28/2018 08:01    Differential    SEE TEXT   1/28/2018 08:01       Text/Comments:  Automated Method       % Neutrophils 61.0  % 1/28/2018 08:01       % Lymphocytes 20.9  % 1/28/2018 08:01       % Monocytes 13.0  % 1/28/2018 08:01       % Eosinophils 3.6  % 1/28/2018 08:01       % Basophils 0.1  % 1/28/2018 08:01       % Immature Grans 1.4  % 1/28/2018 08:01       abs Neutrophils 4.8 1.6-8.3 10e9/L 1/28/2018 08:01       abs Lymphocytes 1.6 0.8-5.3 10e9/L 1/28/2018 08:01       abs Monocytes 1.0 0.0-1.3 10e9/L 1/28/2018 08:01       abs Eosinophils 0.3 0.0-0.7 10e9/L 1/28/2018 08:01       abs Basophils 0.0  0.0-0.2 10e9/L 1/28/2018 08:01       abs Imm Granulocytes 0.1 0-0.4 10e9/L 1/28/2018 08:01    CPT Codes:  A: 80985-EIJLO    TESTING LAB LOCATION:  [facility], [facility] [address] [place] [address]   [id number] [phone]    COLLECTION SITE:  Client:  [facility]  Location:  [facility] (B)              Prealbumin [[id number]] (Abnormal)  Resulted: 01/29/18 1107, Result status: Final result    Ordering provider: Jeferson Portillo MD  01/28/18 0100 Resulting lab: [facility]    Specimen Information    Type Source Collected On   Blood  01/28/18 0723          Components       Value Reference Range Flag Lab   Prealbumin 11 15 - 45 mg/dL L 51            Haptoglobin [[id number]] (Abnormal)  Resulted: 01/29/18 1107, Result status: Final result    Ordering provider: Jeferson Portillo MD  01/28/18 0723 Resulting lab: [facility]    Specimen Information    Type Source Collected On     01/28/18 0723          Components       Value Reference Range Flag Lab   Haptoglobin 321 35 - 175 mg/dL H 51            Basic metabolic panel [[id number]] (Abnormal)  Resulted: 01/29/18 1047, Result status: Final result    Ordering provider: Simeon Machado MD  01/29/18 0803 Resulting lab: [facility]    Specimen Information    Type Source Collected On   Blood  01/29/18 1013          Components       Value Reference Range Flag Lab   Sodium 142 133 - 144 mmol/L  51   Potassium 3.9 3.4 - 5.3 mmol/L  51   Chloride 104 94 - 109 mmol/L  51   Carbon Dioxide 29 20 - 32 mmol/L  51   Anion Gap 8 3 - 14 mmol/L  51   Glucose 101 70 - 99 mg/dL H 51   Urea Nitrogen 14 7 - 30 mg/dL  51   Creatinine 0.74 0.52 - 1.04 mg/dL  51   GFR Estimate 77 >60 mL/min/1.7m2  51   Comment:  Non  GFR Calc   GFR Estimate If Black >90 >60 mL/min/1.7m2  51    Comment:  African American GFR Calc   Calcium 8.6 8.5 - 10.1 mg/dL  51            CBC with platelets [[id number]] (Abnormal)  Resulted: 01/29/18 1028, Result status: Final result    Ordering provider: Simeon Machado MD  01/29/18 0803 Resulting lab: [facility]    Specimen Information    Type Source Collected On   Blood  01/29/18 1013          Components       Value Reference Range Flag Lab   WBC 8.8 4.0 - 11.0 10e9/L  51   RBC Count 3.56 3.8 - 5.2 10e12/L L 51   Hemoglobin 10.5 11.7 - 15.7 g/dL L 51   Hematocrit 34.8 35.0 - 47.0 % L 51   MCV 98 78 - 100 fl  51   MCH 29.5 26.5 - 33.0 pg  51   MCHC 30.2 31.5 - 36.5 g/dL L 51   RDW 13.4 10.0 - 15.0 %  51   Platelet Count 297 150 - 450 10e9/L  51            Vitamin B12 [[id number]]  Resulted: 01/28/18 0840, Result status: Final result    Ordering provider: Jeferson Portillo MD  01/28/18 0100 Resulting lab: [facility]    Specimen Information    Type Source Collected On   Blood  01/28/18 0723          Components       Value Reference Range Flag Lab   Vitamin B12 280 193 - 986 pg/mL  51            Ferritin [[id number]]  Resulted: 01/28/18 0820, Result status: Final result    Ordering provider: Jeferson Portillo MD  01/28/18 0100 Resulting lab: [facility]    Specimen Information    Type Source Collected On   Blood  01/28/18 0723          Components       Value Reference Range Flag Lab   Ferritin 246 8 - 252 ng/mL  51            Iron and iron binding capacity [[id number]] (Abnormal)  Resulted: 01/28/18 0820, Result status: Final result    Ordering provider: Jeferson Portillo MD  01/28/18 0100 Resulting lab: [facility]    Specimen Information    Type Source Collected On   Blood  01/28/18 0723          Components       Value Reference Range Flag Lab   Iron 26 35 - 180 ug/dL L 51   Iron Binding Cap 187 240 - 430 ug/dL L 51    Iron Saturation Index 14 15 - 46 % L 51            WBC Differential [[id number]]  Resulted: 01/28/18 0802, Result status: Final result    Ordering provider: Jeferson Portillo MD  01/28/18 0723 Resulting lab: [facility]    Specimen Information    Type Source Collected On     01/28/18 0723          Components       Value Reference Range Flag Lab   Diff Method Automated Method   51   % Neutrophils 61.0 %  51   % Lymphocytes 20.9 %  51   % Monocytes 13.0 %  51   % Eosinophils 3.6 %  51   % Basophils 0.1 %  51   % Immature Granulocytes 1.4 %  51   Absolute Neutrophil 4.8 1.6 - 8.3 10e9/L  51   Absolute Lymphocytes 1.6 0.8 - 5.3 10e9/L  51   Absolute Monocytes 1.0 0.0 - 1.3 10e9/L  51   Absolute Eosinophils 0.3 0.0 - 0.7 10e9/L  51   Absolute Basophils 0.0 0.0 - 0.2 10e9/L  51   Abs Immature Granulocytes 0.1 0 - 0.4 10e9/L  51            Reticulocyte count [[id number]] (Abnormal)  Resulted: 01/28/18 0802, Result status: Final result    Ordering provider: Jeferson Portillo MD  01/28/18 0723 Resulting lab: [facility]    Specimen Information    Type Source Collected On     01/28/18 0723          Components       Value Reference Range Flag Lab   % Retic 3.4 0.5 - 2.0 % H 51   Absolute Retic 109.9 25 - 95 10e9/L H 51            Basic metabolic panel [[id number]] (Abnormal)  Resulted: 01/28/18 0801, Result status: Final result    Ordering provider: Jeferson Portillo MD  01/28/18 0100 Resulting lab: [facility]    Specimen Information    Type Source Collected On   Blood  01/28/18 0723          Components       Value Reference Range Flag Lab   Sodium 140 133 - 144 mmol/L  51   Potassium 3.7 3.4 - 5.3 mmol/L  51   Chloride 104 94 - 109 mmol/L  51   Carbon Dioxide 30 20 - 32 mmol/L  51   Anion Gap 6 3 - 14 mmol/L  51   Glucose 98 70 - 99 mg/dL  51   Urea Nitrogen 19 7 - 30 mg/dL  51   Creatinine 0.71 0.52 - 1.04  mg/dL  51   GFR Estimate 81 >60 mL/min/1.7m2  51   Comment:  Non  GFR Calc   GFR Estimate If Black >90 >60 mL/min/1.7m2  51   Comment:  African American GFR Calc   Calcium 8.4 8.5 - 10.1 mg/dL L 51            CRP inflammation [[id number]] (Abnormal)  Resulted: 01/28/18 0801, Result status: Final result    Ordering provider: Jeferson Portillo MD  01/28/18 0100 Resulting lab: [facility]    Specimen Information    Type Source Collected On   Blood  01/28/18 0723          Components       Value Reference Range Flag Lab   CRP Inflammation 130.0 0.0 - 8.0 mg/L H 51            Lactate Dehydrogenase [[id number]]  Resulted: 01/28/18 0801, Result status: Final result    Ordering provider: Jeferson Portillo MD  01/28/18 0723 Resulting lab: [facility]    Specimen Information    Type Source Collected On     01/28/18 0723          Components       Value Reference Range Flag Lab   Lactate Dehydrogenase 151 81 - 234 U/L  51            CBC with platelets [[id number]] (Abnormal)  Resulted: 01/28/18 0739, Result status: Final result    Ordering provider: Jeferson Portillo MD  01/28/18 0100 Resulting lab: [facility]    Specimen Information    Type Source Collected On   Blood  01/28/18 0723          Components       Value Reference Range Flag Lab   WBC 7.8 4.0 - 11.0 10e9/L  51   RBC Count 3.32 3.8 - 5.2 10e12/L L 51   Hemoglobin 9.8 11.7 - 15.7 g/dL L 51   Hematocrit 33.0 35.0 - 47.0 % L 51   MCV 99 78 - 100 fl  51   MCH 29.5 26.5 - 33.0 pg  51   MCHC 29.7 31.5 - 36.5 g/dL L 51   RDW 13.5 10.0 - 15.0 %  51   Platelet Count 290 150 - 450 10e9/L  51            Procalcitonin [[id number]]  Resulted: 01/27/18 1600, Result status: Final result    Ordering provider: Valentino Jimenez MD  01/27/18 1241 Resulting lab: [facility]    Specimen Information    Type Source Collected  On     01/27/18 1241          Components       Value Reference Range Flag Lab   Procalcitonin <0.05 ng/ml  51   Comment:         <0.05 ng/ml  Normal  Recommendation: Very low risk of bacterial infection.   Discourage antibiotics unless strong clinical suspicion for serious infection.              UA reflex to Microscopic and Culture [[id number]] (Abnormal)  Resulted: 01/27/18 1424, Result status: Final result    Ordering provider: Margarita Stevens MD  01/27/18 0707 Resulting lab: [facility]    Specimen Information    Type Source Collected On   Catheterized Urine Urine clean catch 01/27/18 1345          Components       Value Reference Range Flag Lab   Color Urine Yellow   51   Appearance Urine Slightly Cloudy   51   Glucose Urine Negative NEG^Negative mg/dL  51   Bilirubin Urine Negative NEG^Negative  51   Ketones Urine 10 NEG^Negative mg/dL A 51   Specific Gravity Urine 1.022 1.003 - 1.035  51   Blood Urine Negative NEG^Negative  51   pH Urine 5.5 5.0 - 7.0 pH  51   Protein Albumin Urine 10 NEG^Negative mg/dL A 51   Urobilinogen mg/dL 2.0 0.0 - 2.0 mg/dL  51   Nitrite Urine Negative NEG^Negative  51   Leukocyte Esterase Urine Trace NEG^Negative A 51   Source Catheterized Urine   51   RBC Urine 1 0 - 2 /HPF  51   WBC Urine 2 0 - 2 /HPF  51   Squamous Epithelial /HPF Urine 2 0 - 1 /HPF H 51   Transitional Epi <1 0 - 1 /HPF  51   Mucous Urine Present NEG^Negative /LPF A 51            Creatinine [[id number]]  Resulted: 01/27/18 1318, Result status: Final result    Ordering provider: Valentino Jimenez MD  01/27/18 1216 Resulting lab: [facility]    Specimen Information    Type Source Collected On   Blood  01/27/18 1241          Components       Value Reference Range Flag Lab   Creatinine 0.80 0.52 - 1.04 mg/dL  51   GFR Estimate 70 >60 mL/min/1.7m2  51   Comment:  Non  GFR Calc   GFR Estimate If Black 85 >60 mL/min/1.7m2  51   Comment:    GFR Calc            Platelet count [[id number]]  Resulted: 01/27/18 1259, Result status: Final result    Ordering provider: Valentino Jimenez MD  01/27/18 1216 Resulting lab: [facility]    Specimen Information    Type Source Collected On   Blood  01/27/18 1241          Components       Value Reference Range Flag Lab   Platelet Count 283 150 - 450 10e9/L  51            Influenza A/B antigen swab [[id number]]  Resulted: 01/27/18 0807, Result status: Final result    Ordering provider: Margarita Stevens MD  01/27/18 0707 Resulting lab: [facility]    Specimen Information    Type Source Collected On   Nasopharyngeal  01/27/18 0728          Components       Value Reference Range Flag Lab   Influenza A/B Agn Specimen Nasopharyngeal   51   Influenza A Negative NEG^Negative  51   Influenza B Negative NEG^Negative  51   Comment:         Test results must be correlated with clinical data. If necessary, results   should be confirmed by a molecular assay or viral culture.              Comprehensive metabolic panel [[id number]] (Abnormal)  Resulted: 01/27/18 0804, Result status: Final result    Ordering provider: Margarita Stevens MD  01/27/18 0707 Resulting lab: [facility]    Specimen Information    Type Source Collected On   Blood  01/27/18 0728          Components       Value Reference Range Flag Lab   Sodium 144 133 - 144 mmol/L  51   Potassium 3.8 3.4 - 5.3 mmol/L  51   Chloride 105 94 - 109 mmol/L  51   Carbon Dioxide 28 20 - 32 mmol/L  51   Anion Gap 10 3 - 14 mmol/L  51   Glucose 93 70 - 99 mg/dL  51   Urea Nitrogen 18 7 - 30 mg/dL  51   Creatinine 0.78 0.52 - 1.04 mg/dL  51   GFR Estimate 73 >60 mL/min/1.7m2  51   Comment:  Non  GFR Calc   GFR Estimate If Black 89 >60 mL/min/1.7m2  51   Comment:  African American GFR Calc   Calcium 8.7 8.5 - 10.1 mg/dL  51   Bilirubin Total 0.4 0.2 - 1.3  mg/dL  51   Albumin 2.4 3.4 - 5.0 g/dL L 51   Protein Total 6.5 6.8 - 8.8 g/dL L 51   Alkaline Phosphatase 56 40 - 150 U/L  51   ALT 10 0 - 50 U/L  51   AST 16 0 - 45 U/L  51            Lipase [[id number]]  Resulted: 01/27/18 0804, Result status: Final result    Ordering provider: Margarita Stevens MD  01/27/18 0707 Resulting lab: [facility]    Specimen Information    Type Source Collected On   Blood  01/27/18 0728          Components       Value Reference Range Flag Lab   Lipase 84 73 - 393 U/L  51            Troponin I [[id number]]  Resulted: 01/27/18 0804, Result status: Final result    Ordering provider: Margarita Stevens MD  01/27/18 0707 Resulting lab: [facility]    Specimen Information    Type Source Collected On   Blood  01/27/18 0728          Components       Value Reference Range Flag Lab   Troponin I ES <0.015 0.000 - 0.045 ug/L  51   Comment:         The 99th percentile for upper reference range is 0.045 ug/L.  Troponin values   in the range of 0.045 - 0.120 ug/L may be associated with risks of adverse   clinical events.              CK total [[id number]]  Resulted: 01/27/18 0804, Result status: Final result    Ordering provider: Margarita Stevens MD  01/27/18 0707 Resulting lab: [facility]    Specimen Information    Type Source Collected On   Blood  01/27/18 0728          Components       Value Reference Range Flag Lab   CK Total 223 30 - 225 U/L  51            INR [[id number]]  Resulted: 01/27/18 0748, Result status: Final result    Ordering provider: Margarita Stevens MD  01/27/18 0707 Resulting lab: [facility]    Specimen Information    Type Source Collected On   Blood  01/27/18 0728          Components       Value Reference Range Flag Lab   INR 1.12 0.86 - 1.14  51            Lactic acid whole blood [[id number]]  Resulted: 01/27/18 0745, Result status: Final result    Ordering  provider: Margarita Stevens MD  01/27/18 0707 Resulting lab: [facility]    Specimen Information    Type Source Collected On   Blood  01/27/18 0728          Components       Value Reference Range Flag Lab   Lactic Acid 0.9 0.7 - 2.0 mmol/L  51            CBC with platelets differential [[id number]] (Abnormal)  Resulted: 01/27/18 0743, Result status: Final result    Ordering provider: Margarita Stevens MD  01/27/18 0707 Resulting lab: [facility]    Specimen Information    Type Source Collected On   Blood  01/27/18 0728          Components       Value Reference Range Flag Lab   WBC 10.4 4.0 - 11.0 10e9/L  51   RBC Count 3.66 3.8 - 5.2 10e12/L L 51   Hemoglobin 10.9 11.7 - 15.7 g/dL L 51   Hematocrit 36.4 35.0 - 47.0 %  51    78 - 100 fl  51   MCH 29.8 26.5 - 33.0 pg  51   MCHC 29.9 31.5 - 36.5 g/dL L 51   RDW 13.5 10.0 - 15.0 %  51   Platelet Count 305 150 - 450 10e9/L  51   Diff Method Automated Method   51   % Neutrophils 70.5 %  51   % Lymphocytes 14.5 %  51   % Monocytes 12.2 %  51   % Eosinophils 1.5 %  51   % Basophils 0.1 %  51   % Immature Granulocytes 1.2 %  51   Nucleated RBCs 0 0 /100  51   Absolute Neutrophil 7.3 1.6 - 8.3 10e9/L  51   Absolute Lymphocytes 1.5 0.8 - 5.3 10e9/L  51   Absolute Monocytes 1.3 0.0 - 1.3 10e9/L  51   Absolute Eosinophils 0.2 0.0 - 0.7 10e9/L  51   Absolute Basophils 0.0 0.0 - 0.2 10e9/L  51   Abs Immature Granulocytes 0.1 0 - 0.4 10e9/L  51   Absolute Nucleated RBC 0.0   51            Testing Performed By     Lab - Abbreviation Name Director Address Valid Date Range    51 - Unknown [facility] Unknown [address] [facility] [address] 12/31/14 1010 - Present    88 - Unknown [facility] Unknown Unknown 10/30/02 0000 - Present            Unresulted Labs (24h ago through future)    Start       Ordered    01/30/18 0600  Platelet count  (Pharmacological Prophylaxis - enoxaparin  (LOVENOX) *Use only if creatinine clearance is greater than 30 mL/min)  EVERY THREE DAYS,   Routine     Comments:  Repeat every 3 days while on VTE prophylaxis.  Notify provider and hold enoxaparin if platelet count falls by 50% of baseline. If no result is listed, this lab has not been done the past 365 days. LATEST LAB RESULT: Platelet Count (10e9/L)       Date                     Value                 01/27/2018               305              ----------    01/27/18 1216    01/30/18 0000  Creatinine  (Pharmacological Prophylaxis - enoxaparin (LOVENOX) *Use only if creatinine clearance is greater than 30 mL/min)  EVERY THREE DAYS,   Routine     Comments:  Repeat every 3 days while on VTE prophylaxis.    01/27/18 1216         Imaging Results - 3 Days      XR Chest 2 Views [[id number]]  Resulted: 01/27/18 0904, Result status: Final result    Ordering provider: Margarita Stevens MD  01/27/18 0707 Resulted by: Alexis Webber MD Crawford, Amanda M, MD    Performed: 01/27/18 0749 - 01/27/18 0800 Resulting lab: RADIOLOGY RESULTS    Narrative:       EXAM: XR CHEST 2 VW  1/27/2018 8:00 AM     HISTORY:  Fall, weakness, history of pneumonia       COMPARISON:  Chest radiograph 1/14/2018    FINDINGS:  PA and lateral views of the chest are obtained.    The cardiomediastinal silhouette is unremarkable. Trace bilateral  pleural effusions. No pneumothorax. When compared to radiograph  1/14/2018 there are persistent, but improved right midlung and lower  lung opacities. Increased left lower lung linear opacities.    Degenerative changes of the glenohumeral joints bilaterally.  Degenerative changes of the thoracic spine. The visualized upper  abdomen is unremarkable. No acute osseous abnormalities.      Impression:       IMPRESSION: Compared to radiograph 1/14/2018, right mid lung and upper  lung opacities persist, but appear improved. New linear left basilar  opacities are favored to represent atelectasis versus infection.    I  have personally reviewed the examination and initial interpretation  and I agree with the findings.    RETA HARDY      CT Head w/o Contrast [[id number]]  Resulted: 01/27/18 0843, Result status: Final result    Ordering provider: Margarita Stevens MD  01/27/18 0707 Resulted by: Makenzie Miramontes MD Crawford, Amanda M, MD    Performed: 01/27/18 0754 - 01/27/18 0827 Resulting lab: RADIOLOGY RESULTS    Narrative:       CT HEAD W/O CONTRAST 1/27/2018 8:27 AM    Provided History: fall, hit back of head    Comparison: None available.    Technique: Using multidetector thin collimation helical acquisition  technique, axial, coronal and sagittal CT images from the skull base  to the vertex were obtained without intravenous contrast.     Findings:    No intracranial hemorrhage, mass effect, or midline shift. The  ventricles are proportionate to the cerebral sulci.  The gray to white  matter differentiation of the cerebral hemispheres is preserved. The  basal cisterns are patent.    The visualized paranasal sinuses are clear. The mastoid air cells are  clear. No external soft tissue swelling. No evidence of calvarial  fracture. Hyperostosis frontalis interna. The orbits are unremarkable.       Impression:       Impression: No acute intracranial pathology.    I have personally reviewed the examination and initial interpretation  and I agree with the findings.    MAKENZIE MIRAMONTES MD      Testing Performed By     Lab - Abbreviation Name Director Address Valid Date Range    104 - Rad [facility] RADIOLOGY RESULTS Unknown Unknown 02/16/05 1553 - Present            Encounter-Level Documents:     There are no encounter-level documents.      Order-Level Documents:     There are no order-level documents.

## 2018-01-27 NOTE — IP AVS SNAPSHOT
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 chart, looking into TCU placement after discharge.    Per RD note on 1/14/18, pt reported improving appetite but not much desire to eat.  Recently got dentures and reported having difficulty eating with them.  Pt reported overall her diet was poor 2/2 living alone and not getting groceries often (tried [employer] grocery service but did not like it).    CURRENT NUTRITION ORDERS  Diet: Regular  Intake/Tolerance: fair-good appetite noted    LABS  Labs reviewed    MEDICATIONS  Medications reviewed    ANTHROPOMETRICS  Height: 172.7 cm (5' 8\")  Most Recent Weight: 117.2 kg (258 lb 6.4 oz)    IBW: 63.6 kg   BMI: Obesity Grade II BMI 35-39.9  Weight History: Weight is down 10# over the past 4 months (3.7% wt loss), but unable to verify wt trends with patient today  Wt Readings from Last 10 Encounters:   01/28/18 117.2 kg (258 lb 6.4 oz)   01/17/18 118.5 kg (261 lb 3.2 oz)   09/27/17 121.6 kg (268 lb 3 oz)   02/13/17 122.4 kg (269 lb " 14.4 oz)   12/25/16 120.7 kg (266 lb)   08/25/16 123.2 kg (271 lb 11.2 oz)   08/10/14 119.3 kg (263 lb)   05/12/14 122 kg (269 lb)   06/09/12 115.8 kg (255 lb 4.7 oz)      Dosing Weight: 77 kg (adjusted based on admit/lowest recent wt 116.8 kg and IBW)    ASSESSED NUTRITION NEEDS  Estimated Energy Needs: 0926-0587 kcals/day (20 - 25 kcals/kg)  Justification: Obese  Estimated Protein Needs: 77-92 grams protein/day (1 - 1.2 grams of pro/kg)  Justification: Maintenance of lean body mass  Estimated Fluid Needs: (1 mL/kcal)   Justification: Maintenance, or per provider pending fluid status    PHYSICAL FINDINGS  See malnutrition section below.    MALNUTRITION  % Intake: Unable to assess  % Weight Loss: Unable to assess  Subcutaneous Fat Loss: Unable to assess  Muscle Loss: Unable to assess  Fluid Accumulation/Edema: chronic lymphedema per chart review  Malnutrition Diagnosis: Unable to determine due to pt busy with cares during attempts to visit    NUTRITION DIAGNOSIS  Predicted inadequate nutrient intake (protein-energy) related to good/fair appetite noted since admission but reports of poor PO intake at home 2/2 decreased access/ability to get groceries consistently    INTERVENTIONS  Implementation  Nutrition Education: Unable to complete due to pt busy with cares during attempts to visit     Goals  Patient to consume % of nutritionally adequate meal trays TID, or the equivalent with supplements/snacks.     Monitoring/Evaluation  Progress toward goals will be monitored and evaluated per protocol.     Berenice Garcia RD, LD   7B RD Pager: [phone][DM1.1]      Revision History        User Key Date/Time User Provider Type Action    > DM1.1 1/29/2018  3:31 PM Berenice Garcia RD Registered Dietitian Sign            Progress Notes by Deborah Maldonado MSW at 1/29/2018 10:14 AM     Author:  Deborah Maldonado MSW Service:  Social Work Author Type:      Filed:  1/29/2018  3:11 PM Date of Service:  1/29/2018 10:14  AM Creation Time:  1/29/2018 10:14 AM    Status:  Signed :  Deborah Maldonado MSW ()         Social Work Services Progress Note    Hospital Day: 3  Date of Initial Social Work Evaluation:  1/27/18- please see for details  Collaborated with:[KT1.1]  Chart review, team rounds, RNCC Eva, nursing facilities[KT1.2]    Data:[KT1.1]  Pt is being followed for TCU placement after admitting from home after a fall due to weakness.[KT1.2]   Received vm from Terry in admissions at [facility] (p. [phone], f. [phone]) reporting that pt can be accepted Tuesday.     Intervention:[KT1.1]  Regina contacted [facility] admissions (p. [phone], f. [phone])- spoke with Abdifatah and he reported that at this time pt doesn't have skilled need, but regina explained PT/OT need to see pt still and asked that pt be reconsidered once therapy notes are in and Abdifatah is agreeable to this.[KT1.2]     Regina met with pt in pt's room as sw was notified pt wanted to discuss getting to [facility]. Pt presented with anger/frustration around her session with PT/OT today saying that the staff wanted her to wear oxygen and used a gait belt, which she reported no one else has used during her stay. PT also reported being upset about therapy staff wanting to do a cognitive screen and to this pt reported she is alert and cognitive and not going to do a test for anybody. Pt discussed having had an isolated mood lately and that she hasn't done her laundry because she worries that people are watching her so she doesn't feel she can leave to go to the laundry room. During conversation pt did appear to be looking off to the side at times and would pause periodically.     Regina explained TCU[KT1.3] cares and recommendations and concerns about pt falling at home. Pt is agreeable to TCU and expressed understanding of explanation and feels that she needs more reinforcement of this different things she has learned here before going back ho. Pt  is also agreeable to a d/c to [facility] and sw explained that pt has been accepted there. Sw explained to pt that a d/c to [facility] is not something that can be pursued from the hospital but that pt should talk with her CADI worker about this and that she can also work with the TCU sw on this.[KT1.4]     Sw called [facility]- left  for admissions inquiring about ability to admit pt today, waiting to hear back.[KT1.2] Natasha called back reporting pt can be accepted today at 6:30pm.[KT1.3]     Assessment:[KT1.1]  See bedside RN, PT/OT, medical team notes[KT1.2]    Plan:    Anticipated Disposition:[KT1.1]  Facility:  TCU, location to be determined[KT1.2]    Barriers to d/c plan:[KT1.1]  Bed availability/acceptance, awaiting PT/OT recs[KT1.2]    Follow Up:[KT1.1]  TAYLER, regina will continue to follow    JULIETH Beckman MSW     [phone] (pager) [phone]  1/29/2018[KT1.2]             Revision History        User Key Date/Time User Provider Type Action    > KT1.4 1/29/2018  3:11 PM Deborah Maldonado MSW  Sign     KT1.3 1/29/2018  3:02 PM Deborah Maldonado MSW       KT1.2 1/29/2018 10:46 AM Deborah Maldonado MSW       KT1.1 1/29/2018 10:14 AM Deborah Maldonado MSW              Progress Notes by Rachel King PT at 1/29/2018  3:08 PM     Author:  Rachel King PT Service:  (none) Author Type:  Physical Therapist    Filed:  1/29/2018  3:08 PM Date of Service:  1/29/2018  3:08 PM Creation Time:  1/29/2018  3:08 PM    Status:  Signed :  Rachel King PT (Physical Therapist)          01/29/18 1310   Quick Adds   Type of Visit Initial PT Evaluation   Living Environment   Lives With alone   Living Arrangements apartment   Home Accessibility no concerns   Number of Stairs to Enter Home 0   Number of Stairs Within Home 0   Living Environment Comment walk-in shower, where pt lives there are not many safe intersections to cross the street, pt  reports neighborhood is not very safe. Her apt is very cluttered, she brings back things from [employer] nearby regularly. She acknowledges she has many unneeded items and that she could not even walk her hallway with a walker due to the clutter.    Self-Care   Dominant Hand right   Usual Activity Tolerance moderate   Current Activity Tolerance fair  (to moderate)   Regular Exercise no   Equipment Currently Used at Home grab bar;shower chair   Activity/Exercise/Self-Care Comment Pt has ILS worker who provides support, 3 hrs  for cleaning, laundry. Pt reports showering a challenge and she needs more help. She is not able to wash her back well with current set-up. Reports cooking and keeping areas clean is difficult. She does not feel well organized and her environment is overly cluttered to the point she cannot walk through with a walker as she has been previously recommended to do. Report she does not feel she has enough support at home. At times has fallen and been stuck on the floor because she has not had a way up (history B knee replacements, unable to kneel), too weak to assist herself up.    Functional Level Prior   Ambulation 0-->independent   Transferring 0-->independent   Toileting 1-->assistive equipment  (grab bar)   Bathing 1-->assistive equipment  (shower chair, hand-held shower head)   Dressing 0-->independent   Eating 0-->independent   Communication 0-->understands/communicates without difficulty   Swallowing 0-->swallows foods/liquids without difficulty   Cognition 0 - no cognition issues reported   Fall history within last six months yes   Number of times patient has fallen within last six months 10   Which of the above functional risks had a recent onset or change? ambulation;transferring;bathing;eating   Prior Functional Level Comment Reports decline in functional strength. Also has tried to walk outside with a cane but has not felt safe due to the weather recently.    General  Information   Onset of Illness/Injury or Date of Surgery - Date 01/27/18   Referring Physician Valentino Jimenez MD   Patient/Family Goals Statement To learn more about [facility], pt feels this may be better setting for her.    Pertinent History of Current Problem (include personal factors and/or comorbidities that impact the POC) Lynda Delgadillo is a 72 year old woman w/ PMx of chronic lymphedema, urinary incontinence, bipolar disorder, schizophrenia, and anxiety who presents for weakness, falls, and inability to thrive in home environment. She previously refused TCU after recent admission for pneumonia, but now believes she needs TCU or nursing home.    Precautions/Limitations fall precautions   General Info Comments activity: up with assist   Cognitive Status Examination   Level of Consciousness alert   Follows Commands and Answers Questions 100% of the time   Personal Safety and Judgment intact;impaired  (refuses supplemental O2 when sats 84%, but now using walker )   Cognitive Comment reports she needs further emotional support, has had depression and a lot of inactivity at home due to this   Pain Assessment   Patient Currently in Pain No   Posture    Posture Comments slight reduced upright posture   Range of Motion (ROM)   ROM Comment no deficits   Strength   Strength Comments shoulder abd 4-/5 flex 4/5, hip flex 4-/5 otherwise no deficits noted   Bed Mobility   Bed Mobility Comments IND supine<>sit   Transfer Skills   Transfer Comments CGA-SBA sit<>stand, reduced safety noted with transfers   Gait   Gait Comments SBA with walker. Without walker less stable and speed increased   Balance   Balance Comments Timed up and Go scores: with walker pt scored 23.6 sec, without walker pt scored 14.6 sec (scores <13.5 sec indicate increased falling risk)   Coordination   Coordination no deficits were identified   General Therapy Interventions   Planned Therapy Interventions bed mobility training;gait  "training;neuromuscular re-education;ROM;stretching;strengthening;transfer training;progressive activity/exercise;home program guidelines   Clinical Impression   Criteria for Skilled Therapeutic Intervention yes, treatment indicated   PT Diagnosis weakness   Influenced by the following impairments reduced proximal strength, balance, safety, activity tolerance   Functional limitations due to impairments below baseline bed mobility, transfers, gait   Clinical Presentation Evolving/Changing   Clinical Presentation Rationale changing emotional and physical status   Clinical Decision Making (Complexity) Moderate complexity   Therapy Frequency` daily   Predicted Duration of Therapy Intervention (days/wks) 1 week   Anticipated Equipment Needs at Discharge (insert bedrail, 4WW)   Anticipated Discharge Disposition Transitional Care Facility;Home with Home Therapy;Home with Assist  (however home currently does not appear safe per clutter)   Risk & Benefits of therapy have been explained Yes   Patient, Family & other staff in agreement with plan of care Yes   [facility] TM \"6 Clicks\"   2016, Trustees of [facility], under license to [employer].  All rights reserved.   6 Clicks Short Forms Basic Mobility Inpatient Short Form   [facility]  \"6 Clicks\" V.2 Basic Mobility Inpatient Short Form   1. Turning from your back to your side while in a flat bed without using bedrails? 4 - None   2. Moving from lying on your back to sitting on the side of a flat bed without using bedrails? 4 - None   3. Moving to and from a bed to a chair (including a wheelchair)? 3 - A Little   4. Standing up from a chair using your arms (e.g., wheelchair, or bedside chair)? 3 - A Little   5. To walk in hospital room? 3 - A Little   6. Climbing 3-5 steps with a railing? 3 - A Little   Basic Mobility Raw Score (Score out of 24.Lower scores equate to lower levels of function) 20   Total Evaluation Time   Total Evaluation " Time (Minutes) 13[JB1.1]        Revision History        User Key Date/Time User Provider Type Action    > JB1.1 1/29/2018  3:08 PM Rachel King PT Physical Therapist Sign            Progress Notes by Anna Brewer RN at 1/29/2018 12:04 PM     Author:  Anna Brewer RN Service:  (none) Author Type:  Registered Nurse    Filed:  1/29/2018 12:04 PM Date of Service:  1/29/2018 12:04 PM Creation Time:  1/29/2018 12:04 PM    Status:  Signed :  Anna Brewer RN (Registered Nurse)         [facility]  Patient is currently open to home care services with [facility].  The patient is currently receiving RN/PT services.  Critical access hospital  and team have been notified of patient admission.  Critical access hospital liaison will continue to follow patient during stay.  If appropriate provide orders to resume home care at time of discharge.    Thank you  Anna Brewer RN, BSN  [facility] Liaison  [phone][SH1.1]       Revision History        User Key Date/Time User Provider Type Action    > SH1.1 1/29/2018 12:04 PM Anna Brewer RN Registered Nurse Sign            Progress Notes by Angie Hanks LICSW at 1/28/2018  3:44 PM     Author:  Angie Hanks LICSW Service:  Social Work Author Type:      Filed:  1/28/2018  3:47 PM Date of Service:  1/28/2018  3:44 PM Creation Time:  1/28/2018  3:44 PM    Status:  Addendum :  Angie Hanks LICSW ()         Social Work Services Progress Note    Hospital Day: 2  Date of Initial Social Work Evaluation:  1.27.18  Consulted with:  Chart    Data:  Patient requiring TCU after hospital discharge.     Intervention:  Facilities unable to assess until PT/OT consults are completed. Will have weekday SW fax these once they are completed.    Preferred facilities who have already had the referral sent to them:    1) [facility] Phone: [phone]     2) [facility] Phone: [phone]    Assessment:  Patient requiring TCU but needs  PT/OT assessments completed first.    Plan:    Anticipated Disposition:  Facility:  [facility]    Barriers to d/c plan:  PT/OT consults and bed availability    Follow Up:  Will have weekday SW fax P/OT consults after completed.    Angie HINES  2018    ON CALL PAGER   0800 - 1600   [phone]    ON CALL COVERAGE AFTER 1600  [phone][EL1.1]         Revision History        User Key Date/Time User Provider Type Action    > [N/A] 2018  3:47 PM Angie Hanks LICSW  Addend     EL1.1 2018  3:47 PM Angie Hanks LICSW  Sign            Progress Notes by Sharita Kyle MSW at 2018 10:20 AM     Author:  Sharita Kyle MSW Service:  Social Work Author Type:      Filed:  2018  4:03 PM Date of Service:  2018 10:20 AM Creation Time:  2018 10:20 AM    Status:  Signed :  Shariat Kyle MSW ()         Social Work: Assessment with Discharge Plan    Patient Name:  Lynda Delgadillo  :  1945  Age:  72 year old  MRN:  [id number]  Risk/Complexity Score:     Completed assessment with:[KS1.1]  Pt and chart review[KS1.2]    Presenting Information   Reason for Referral:[KS1.1]  Discharge plan and Potential need for community services upon discharge[KS1.3]  Date of Intake:  2018  Referral Source:[KS1.1]  Nurse[KS1.3]  Decision Maker:[KS1.1]  Pt[KS1.3]  Alternate Decision Maker:[KS1.1]  NOK would be daughter in [place], doesn't know number[KS1.3]  Health Care Directive:[KS1.1]  DNI/DNR[KS1.3]  Living Situation:[KS1.1]  Apartment - independent living apartment; chart review also lists as senior living[KS1.3]  Previous Functional Status:[KS1.1]  Assistance with Other:  pt has ILS worker, homemaking, could have PCA, CADI JOY Lundy [phone][KS1.3]  Patient and family understanding of hospitalization:[KS1.1]  Pt states she has fallen[KS1.3]   Cultural/Language/Spiritual Considerations:[KS1.1]  Raised  Adventist, worshipped in Sikhism and Methodist contexts, not currently affiliated with a specific Adventist. Maintains her spirituality through bible study.[KS1.3]  Adjustment to Illness:[KS1.1]  Pt is alert and oriented, takes an extra second to gather her thoughts before responding. Engaged well with  services.[KS1.3]    Physical Health  Reason for Admission:    1. Weakness    2. Fall, initial encounter      Services Needed/Recommended:[KS1.1]  TCU vs Home with services[KS1.3]    Mental Health/Chemical Dependency  Diagnosis:[KS1.1]  Per chart review, schizoaffective, bipolar type. Indentifies as having bipolar.[KS1.3]  Support/Services in Place:[KS1.1]  ILS worker, JEANETTE kowalski, homemaking services    Psychiatrist  Dr Kwabena Chery: Psychiatrist @ [facility].  [address], [place] [address]  Phone: [phone]  :  Jeanette CM: Luis Lundy @ [facility].  [phone]     Therapist:  Ethan Dasilva  [address], [place] [address]  Phone: [phone]    States she only sees her therapist every two months and thinks she needs to see him more because recently she's been very depressed[KS1.3]    Services Needed/Recommended:[KS1.1] Med review and increased follow up with therapy[KS1.3]    Support System  Significant relationship at present time:[KS1.1]  Pt has a boyfriend that she talks to often but identifies that she moved further away from him because it was too much work for her to care for him[KS1.3]  Family of origin is available for support:[KS1.1]  Pt has 12 siblings, they are all living. She has an identical sister who also has bipolar disorder. There is + history of alcoholism, drug abuse, and gambling in siblings. Pt believes her mother was depressed as she tried to raise 13 children. Pt identifies her sister Linda Talavera and her  Amrik as supporters but state Linda had brain surgery last year and cannot handle much. Pt has a daughter who lives in [place]  and [place] in the winter with her three kids.[KS1.3]  Other support available:[KS1.1]  Professional MH supports[KS1.3]  Gaps in support system:[KS1.1]  Pt may need TCU level of care while she recovers from being sick[KS1.3]  Patient is caregiver to:[KS1.1]  None[KS1.3]     Provider Information   Primary Care Physician:  Owen Jurado   [phone]   Clinic:  [facility] [address] [facility]   / [address]      :[KS1.1]  GAEL Lundy [phone][KS1.3]    Financial   Income Source:[KS1.1]  Disability[KS1.3]  Financial Concerns:[KS1.1]  None identified[KS1.3]  Insurance:    Payor/Plan Subscriber Name Rel Member # Group #   MEDICA - MEDICA DUAL * GARODANIEL  [id number]       [id number]       Discharge Plan   Patient and family discharge goal:[KS1.1]  Agreeable to TCU[KS1.3]  Provided education on discharge plan:[KS1.1]  YES[KS1.3]  Patient agreeable to discharge plan:[KS1.1]  YES[KS1.3]  A list of Medicare Certified Facilities was provided to the patient and/or family to encourage patient choice. Patient's choices for facility are:[KS1.1]      1. [facility][KS1.3]  Address: [facility] [place] [address]  Phone: [phone][KS1.4]    2. [facility][KS1.3]  Address: [address] [place] [address]  Phone: [phone][KS1.4]    Will NH provide Skilled rehabilitation or complex medical:[KS1.1]  A facility would, her ind apt at home would not[KS1.3]  General information regarding anticipated insurance coverage and possible out of pocket cost was discussed. Patient and patient's family are aware patient may incur the cost of transportation to the facility, pending insurance payment:[KS1.1] YES[KS1.3]  Barriers to discharge:[KS1.1]  Pending medical clearance[KS1.3]    Discharge Recommendations   Anticipated Disposition:[KS1.1]  TBD TCU vs home with services.[KS1.3]  Transportation Needs:[KS1.1]  Medical:  Other:  [facility] Provide a  Ride[KS1.3]  Name of Transportation Company and Phone:[KS1.1]  [phone]  Per chart review:[KS1.3] They cannot schedule same day rather request we call once patient is full discharged and ready for .[KS1.5]    Additional comments[KS1.1]   Pt, Lynda is a 72 year old female that presents to the ED after falling in her home. She was recently hospitalized here for pneumonia and generalized weakness and discharged on 1/17/17. She states since she has fallen many times, and at times has not been able to get herself back up. Today she fell and was on the floor for hours before calling EMS. EMS arrived and pt was naked on the floor. Pt attributes some of her weakness to her depression. She has not been successful at home despite being connected with CADI, psychiatry, MH therapy, home care, ILS services, and homemaking services. Pt had PT and OT eval done last hospitalization visit, and would not need a 3 night hospital stay to qualify for TCU.[KS1.3] Per ED MD, pt needed to be admitted and this could not be avoided with ED to TCU placement.[KS1.6] Referrals sent to first two identified locations via discharge on the double.     Sharita MARCUS YESICA  ED   ED Pager: [phone]  On-call/After hours Pager: [phone][KS1.3]           Revision History        User Key Date/Time User Provider Type Action    > KS1.4 1/27/2018  4:03 PM Sharita Kyle MSW  Sign     KS1.6 1/27/2018  4:00 PM Sharita Kyle MSW       KS1.3 1/27/2018  3:24 PM Sharita Kyle MSW       KS1.5 1/27/2018  3:23 PM Sharita Kyle MSW       KS1.2 1/27/2018  3:17 PM Sharita Kyle MSW       KS1.1 1/27/2018 10:20 AM Sharita Kyle MSW              Progress Notes by Darren Agarwal at 1/27/2018 12:01 PM     Author:  Darren Agarwal Service:  (none) Author Type:      Filed:  1/27/2018 12:08 PM Date of Service:  1/27/2018  "12:01 PM Creation Time:  1/27/2018 12:01 PM    Status:  Signed :  Darren Agarwal ()         SPIRITUAL HEALTH SERVICES  SPIRITUAL ASSESSMENT Progress Note  [facility] 7B    REFERRAL SOURCE:  initiated visit.    Reviewed documentation. Reflective conversation shared with Ms Delgadillo. I introduced myself to Ms Delgadillo when she was wheeled on to the unit and parked near me in the garcia while her room was being arranged for her. Ms Delgadillo described having come to the hospital because she \"was crawling on the floor for seven hours... I think I did the right thing to come here.\"    Explained Spiritual Health Services, offered our support for her healing, and explained how to ask for a . She will call for us when needed.    Ms Delgadillo explained that she was raised Uatsdin, has worshipped in Protestant and Amish contexts, and is currently not affiliated with a specific Sikh. She maintains her spirituality through Bible study.    PLAN: I will follow up at my next shift, if Ms Delgadillo remains on 7B, to offer emotional support.                                                                                                                                           Darren Agarwal   Intern  Pager [phone][NM1.1]       Revision History        User Key Date/Time User Provider Type Action    > NM1.1 1/27/2018 12:08 PM Darren Agarwal Sign            ED Provider Notes by Margarita Stevens MD at 1/27/2018  5:54 AM     Author:  Margarita Stevens MD Service:  Emergency Medicine Author Type:  Physician    Filed:  1/27/2018 11:54 AM Date of Service:  1/27/2018  5:54 AM Creation Time:  1/27/2018  6:59 AM    Status:  Signed :  Margarita Stevens MD (Physician)           History[AM1.1]     Chief Complaint   Patient presents with     Fall[AM1.2]     HPI  Lynda Delgadillo is a 72 year old female with a past medical history of lymphedema and bipolar disorder who who presents after a " "fall.  The patient apparently spent about 7 hours crawling around on the floor and was unable to get up.  She tells me she has been falling for the past several days.  She has been unable to walk for the past 2-3 days and she lives alone.  She states she has no strength in her legs.  She fell last night walking out of the bathroom and hit the back of her head.  Nurse's notes state that she was found in her apartment without wero[AM1.1]thing[AM1.3] on.  She states she was recently treated for pneumonia.  She currently states she hurts all over including her head.  She denies chest pain however or shortness of breath.  She states she has cellulitis of her lower extremities and finished her last antibiotic dose yesterday.  She was unable to take her medications last night.[AM1.1]  Social: lives alone, former smoker[AM1.4]  I have reviewed the Medications, Allergies, Past Medical and Surgical History, and Social History in the Epic system.    Review of Systems   Constitutional: Negative for fever.   Eyes: Negative.    Respiratory: Negative for shortness of breath.    Cardiovascular: Negative for chest pain.   Gastrointestinal: Negative for abdominal pain, nausea and vomiting.   Genitourinary: Negative for flank pain.   Musculoskeletal: Positive for gait problem. Negative for neck pain.        See hpi   Skin: Negative for rash.   Neurological: Positive for weakness. Negative for headaches.   Psychiatric/Behavioral: Negative.    All other systems reviewed and are negative.      Physical Exam[AM1.1]   BP: 106/89  Heart Rate: 87  Temp: 99.3  F (37.4  C)  Resp: 18  Height: 172.7 cm (5' 8\")  SpO2: (!) 89 %[AM1.2]      Physical Exam  Physical Exam   Constitutional:   Elderly female, appears slightly uncomfortable  HENT:   Head: Normocephalic, tender to posterior scalp with no appreciable contusion  Eyes: Conjunctivae are normal. Pupils are equal, round, and reactive to light.    pharynx has no erythema or exudate, mucous " membranes are moist  Neck:   no adenopathy, no bony tenderness  Cardiovascular: regular rate and rhythm without murmurs or gallops  Pulmonary/Chest: Clear to auscultation bilaterally, with no wheezes or retractions. No respiratory distress.  GI: Soft with good bowel sounds.  Non-tender, non-distended, with no guarding, no rebound, no peritoneal signs.   Back:  No bony or CVA tenderness   Musculoskeletal: Pitting edema to BLE, redness and heat to bilateral anterior knees lymphedema of lower extremities  Skin: Skin is warm and dry. No rash noted.   Neurological: alert and oriented to person, place, and time. Nonfocal exam, JAY, speech is slightly slow. GCS is 15  Psychiatric:  normal mood and affect.   ED Course[AM1.1]     ED Course[AM1.2]     Procedures             EKG Interpretation:      Interpreted by Margarita Stevens  Time reviewed: 0717 am  Symptoms at time of EKG: see hpi   Rhythm: normal sinus   Rate: normal  Axis: normal  Ectopy: none  Conduction: normal  ST Segments/ T Waves: No ST-T wave changes  Q Waves: none  Comparison to prior: Unchanged from 1/14/2018    Clinical Impression: NSR rate of 84 bpm with no acute ischemic changes          Critical Care time:[AM1.1]  none[AM1.5]            Results for orders placed or performed during the hospital encounter of 01/27/18 (from the past 24 hour(s))   EKG 12-lead, tracing only   Result Value Ref Range    Interpretation ECG Click View Image link to view waveform and result    CBC with platelets differential   Result Value Ref Range    WBC 10.4 4.0 - 11.0 10e9/L    RBC Count 3.66 (L) 3.8 - 5.2 10e12/L    Hemoglobin 10.9 (L) 11.7 - 15.7 g/dL    Hematocrit 36.4 35.0 - 47.0 %     78 - 100 fl    MCH 29.8 26.5 - 33.0 pg    MCHC 29.9 (L) 31.5 - 36.5 g/dL    RDW 13.5 10.0 - 15.0 %    Platelet Count 305 150 - 450 10e9/L    Diff Method Automated Method     % Neutrophils 70.5 %    % Lymphocytes 14.5 %    % Monocytes 12.2 %    % Eosinophils 1.5 %    % Basophils 0.1 %     % Immature Granulocytes 1.2 %    Nucleated RBCs 0 0 /100    Absolute Neutrophil 7.3 1.6 - 8.3 10e9/L    Absolute Lymphocytes 1.5 0.8 - 5.3 10e9/L    Absolute Monocytes 1.3 0.0 - 1.3 10e9/L    Absolute Eosinophils 0.2 0.0 - 0.7 10e9/L    Absolute Basophils 0.0 0.0 - 0.2 10e9/L    Abs Immature Granulocytes 0.1 0 - 0.4 10e9/L    Absolute Nucleated RBC 0.0    INR   Result Value Ref Range    INR 1.12 0.86 - 1.14   Comprehensive metabolic panel   Result Value Ref Range    Sodium 144 133 - 144 mmol/L    Potassium 3.8 3.4 - 5.3 mmol/L    Chloride 105 94 - 109 mmol/L    Carbon Dioxide 28 20 - 32 mmol/L    Anion Gap 10 3 - 14 mmol/L    Glucose 93 70 - 99 mg/dL    Urea Nitrogen 18 7 - 30 mg/dL    Creatinine 0.78 0.52 - 1.04 mg/dL    GFR Estimate 73 >60 mL/min/1.7m2    GFR Estimate If Black 89 >60 mL/min/1.7m2    Calcium 8.7 8.5 - 10.1 mg/dL    Bilirubin Total 0.4 0.2 - 1.3 mg/dL    Albumin 2.4 (L) 3.4 - 5.0 g/dL    Protein Total 6.5 (L) 6.8 - 8.8 g/dL    Alkaline Phosphatase 56 40 - 150 U/L    ALT 10 0 - 50 U/L    AST 16 0 - 45 U/L   Lipase   Result Value Ref Range    Lipase 84 73 - 393 U/L   Lactic acid whole blood   Result Value Ref Range    Lactic Acid 0.9 0.7 - 2.0 mmol/L   Troponin I   Result Value Ref Range    Troponin I ES <0.015 0.000 - 0.045 ug/L   CK total   Result Value Ref Range    CK Total 223 30 - 225 U/L   Influenza A/B antigen swab   Result Value Ref Range    Influenza A/B Agn Specimen Nasopharyngeal     Influenza A Negative NEG^Negative    Influenza B Negative NEG^Negative   XR Chest 2 Views    Narrative    EXAM: XR CHEST 2 VW  1/27/2018 8:00 AM     HISTORY:  Fall, weakness, history of pneumonia       COMPARISON:  Chest radiograph 1/14/2018    FINDINGS:  PA and lateral views of the chest are obtained.    The cardiomediastinal silhouette is unremarkable. Trace bilateral  pleural effusions. No pneumothorax. When compared to radiograph  1/14/2018 there are persistent, but improved right midlung and lower  lung  opacities. Increased left lower lung linear opacities.    Degenerative changes of the glenohumeral joints bilaterally.  Degenerative changes of the thoracic spine. The visualized upper  abdomen is unremarkable. No acute osseous abnormalities.      Impression    IMPRESSION: Compared to radiograph 1/14/2018, right mid lung and upper  lung opacities persist, but appear improved. New linear left basilar  opacities are favored to represent atelectasis versus infection.    I have personally reviewed the examination and initial interpretation  and I agree with the findings.    RETA HARDY   CT Head w/o Contrast    Narrative    CT HEAD W/O CONTRAST 1/27/2018 8:27 AM    Provided History: fall, hit back of head    Comparison: None available.    Technique: Using multidetector thin collimation helical acquisition  technique, axial, coronal and sagittal CT images from the skull base  to the vertex were obtained without intravenous contrast.     Findings:    No intracranial hemorrhage, mass effect, or midline shift. The  ventricles are proportionate to the cerebral sulci.  The gray to white  matter differentiation of the cerebral hemispheres is preserved. The  basal cisterns are patent.    The visualized paranasal sinuses are clear. The mastoid air cells are  clear. No external soft tissue swelling. No evidence of calvarial  fracture. Hyperostosis frontalis interna. The orbits are unremarkable.       Impression    Impression: No acute intracranial pathology.    I have personally reviewed the examination and initial interpretation  and I agree with the findings.    MAKENZIE MIRAMONTES MD        Labs Ordered and Resulted from Time of ED Arrival Up to the Time of Departure from the ED   CBC WITH PLATELETS DIFFERENTIAL - Abnormal; Notable for the following:        Result Value    RBC Count 3.66 (*)     Hemoglobin 10.9 (*)     MCHC 29.9 (*)     All other components within normal limits   COMPREHENSIVE METABOLIC PANEL - Abnormal; Notable  for the following:     Albumin 2.4 (*)     Protein Total 6.5 (*)     All other components within normal limits   INR   LIPASE   LACTIC ACID WHOLE BLOOD   TROPONIN I   CK TOTAL   UA MACROSCOPIC WITH REFLEX TO MICRO AND CULTURE   INFLUENZA A/B ANTIGEN[AM1.2]            Assessments & Plan (with Medical Decision Making)       I have reviewed the nursing notes.  Emergency Department course:  The patient was seen and examined at 0658 am, on my arrival to the ED. EKG shows a normal sinus rhythm, rate of 84 bpm, with no acute ischemic changes[AM1.1].  Laboratory studies, including a CBC, lipase, comprehensive metabolic panel, and INR are essentially unremarkable.  She is anemic, with a hemoglobin of 10.9.  Lactate is within normal limits at 0.9.  Troponin I is less than 0.015.  CK is 223 and influenza A is negative.  Head CT shows no acute intracranial pathology.  Chest x-ray shows  Compared to radiograph 1/14/2018, right mid lung and upper lung  opacities persist, but appear improved. New linear left basilar  opacities are favored to represent atelectasis versus infection.  UA was delayed[AM1.4] as the patient was unable to urinate.  I treated her with a normal saline bolus IV.[AM1.6]    Patient is a 72-year-old female status post fall and weakness and inability to walk for 2-3 days.[AM1.4]  Laboratory studies are essentially unremarkable.  Chest x-ray shows persistent opacities but improved from prior studies.[AM1.6]  She lives alone and will not be able to manage by herself[AM1.4].  She will be admitted to the medicine service for further evaluation and treatment.  I spoke with [AM1.6] Conor[AM1.3] of medicine regarding admission.[AM1.6]   She was admitted upstairs at approximately[AM1.5] 1145 am.[AM1.7]     I have reviewed the findings, diagnosis, plan and need for follow up with the patient.[AM1.1]    New Prescriptions    No medications on file       Final diagnoses:   Weakness   Fall, initial encounter[AM1.2]        1/27/2018   [facility], EMERGENCY DEPARTMENT  This note was created in part by the use of Dragon voice recognition dictation system. Inadvertent grammatical errors and typographical errors may still exist.  Margarita Stevens MD[AM1.1]       Margarita Stevens MD  01/27/18 1154  [AM1.7]     Revision History        User Key Date/Time User Provider Type Action    > AM1.7 1/27/2018 11:54 AM Margarita Stevens MD Physician Sign     AM1.5 1/27/2018 11:00 AM Margarita Stevens MD Physician      AM1.3 1/27/2018  9:25 AM Margarita Stevens MD Physician      AM1.2 1/27/2018  9:22 AM Margarita Stevens MD Physician      AM1.6 1/27/2018  9:21 AM Margarita Stevens MD Physician      AM1.4 1/27/2018  8:54 AM Margarita Stevens MD Physician      AM1.1 1/27/2018  6:59 AM Margarita Stevens MD Physician             ED Notes by Hao Michele RN at 1/27/2018 10:27 AM     Author:  Hao Michele RN Service:  (none) Author Type:  Registered Nurse    Filed:  1/27/2018 10:30 AM Date of Service:  1/27/2018 10:27 AM Creation Time:  1/27/2018 10:30 AM    Status:  Signed :  Hao Michele RN (Registered Nurse)         [facility]   ED Nurse to Floor Handoff     Lynda Delgadillo is a 72 year old female who speaks English and lives alone,  in a home  They arrived in the ED by ambulance from home    ED Chief Complaint: Fall    ED Dx;   Final diagnoses:   Weakness   Fall, initial encounter         Needed?: No    Allergies:   Allergies   Allergen Reactions     Dilaudid Cough Other (See Comments)     Psychosis and agitation     Haldol Swelling     Tongue swelling     Lactose GI Disturbance     Morphine      I have a anger reaction.     Neurontin [Gabapentin] Unknown     Patient states she just refuses to take it after reading side effect profile     Penicillins Hives   .  Past Medical Hx:   Past Medical History:   Diagnosis Date     Arthritis     right neck  pain radiating down to numb fingers      Bipolar disorder (H)      LOC (loss of consciousness) (H) age 24    tripped down outside stairs and head hit concrete     Lymphedema age 64    both ankles and feet      Baseline Mental status: WDL  Current Mental Status changes: at basesline    Infection: No  Sepsis suspected: No  Isolation type: No active isolations     Activity level - Baseline/Home:  Independent  Activity Level - Current:   Stand with Assist of 2    Bariatric equipment needed?: Yes    In the ED these meds were given:   Medications   0.9% sodium chloride BOLUS (1,000 mLs Intravenous New Bag 1/27/18 1004)       Drips running?  No    Home pump or pre-existing LDA's present? No    Labs results:   Labs Ordered and Resulted from Time of ED Arrival Up to the Time of Departure from the ED   CBC WITH PLATELETS DIFFERENTIAL - Abnormal; Notable for the following:        Result Value    RBC Count 3.66 (*)     Hemoglobin 10.9 (*)     MCHC 29.9 (*)     All other components within normal limits   COMPREHENSIVE METABOLIC PANEL - Abnormal; Notable for the following:     Albumin 2.4 (*)     Protein Total 6.5 (*)     All other components within normal limits   INR   LIPASE   LACTIC ACID WHOLE BLOOD   TROPONIN I   CK TOTAL   UA MACROSCOPIC WITH REFLEX TO MICRO AND CULTURE   INFLUENZA A/B ANTIGEN       Imaging Studies:   Recent Results (from the past 24 hour(s))   XR Chest 2 Views    Narrative    EXAM: XR CHEST 2 VW  1/27/2018 8:00 AM     HISTORY:  Fall, weakness, history of pneumonia       COMPARISON:  Chest radiograph 1/14/2018    FINDINGS:  PA and lateral views of the chest are obtained.    The cardiomediastinal silhouette is unremarkable. Trace bilateral  pleural effusions. No pneumothorax. When compared to radiograph  1/14/2018 there are persistent, but improved right midlung and lower  lung opacities. Increased left lower lung linear opacities.    Degenerative changes of the glenohumeral joints bilaterally.  Degenerative changes of the thoracic spine. The  "visualized upper  abdomen is unremarkable. No acute osseous abnormalities.      Impression    IMPRESSION: Compared to radiograph 1/14/2018, right mid lung and upper  lung opacities persist, but appear improved. New linear left basilar  opacities are favored to represent atelectasis versus infection.    I have personally reviewed the examination and initial interpretation  and I agree with the findings.    RETA LAMBSUNITHA   CT Head w/o Contrast    Narrative    CT HEAD W/O CONTRAST 1/27/2018 8:27 AM    Provided History: fall, hit back of head    Comparison: None available.    Technique: Using multidetector thin collimation helical acquisition  technique, axial, coronal and sagittal CT images from the skull base  to the vertex were obtained without intravenous contrast.     Findings:    No intracranial hemorrhage, mass effect, or midline shift. The  ventricles are proportionate to the cerebral sulci.  The gray to white  matter differentiation of the cerebral hemispheres is preserved. The  basal cisterns are patent.    The visualized paranasal sinuses are clear. The mastoid air cells are  clear. No external soft tissue swelling. No evidence of calvarial  fracture. Hyperostosis frontalis interna. The orbits are unremarkable.       Impression    Impression: No acute intracranial pathology.    I have personally reviewed the examination and initial interpretation  and I agree with the findings.    MAKENZIE MIRAMONTES MD       Recent vital signs:   /56  Temp 99.3  F (37.4  C) (Oral)  Resp 16  Ht 1.727 m (5' 8\")  SpO2 97%    Cardiac Rhythm: Normal Sinus  Pt needs tele? No  Skin/wound Issues: None    Code Status: Full Code    Pain control: pt had none    Nausea control: pt had none    Abnormal labs/tests/findings requiring intervention:  None    Family present during ED course? No   Family Comments/Social Situation comments: Pt has fallen 10 times at home in the last month per patient, but does not want to live in an " assisted living. Social work well aware of patient     Tasks needing completion: Urine collection, pt attempted to urinate, but was unable. 1 liter bolus given     Hao Michele RN  [facility]-- [phone] [place] [facility]  [phone] [facility] ED[CR1.1]       Revision History        User Key Date/Time User Provider Type Action    > CR1.1 1/27/2018 10:30 AM Hao Michele, RN Registered Nurse Sign            ED Notes by Millie Anne RN at 1/27/2018  6:04 AM     Author:  Millie Anne RN Service:  (none) Author Type:  Registered Nurse    Filed:  1/27/2018  6:08 AM Date of Service:  1/27/2018  6:04 AM Creation Time:  1/27/2018  6:04 AM    Status:  Signed :  Millie Anne RN (Registered Nurse)         Pt BIBA after falling. She was down for 7 hours and found without clothes on. She states she has fallen over 10 times in the past 6 months. She does not use a cane or walker. VSS except needing oxygen upon arrival.[LC1.1]      Revision History        User Key Date/Time User Provider Type Action    > LC1.1 1/27/2018  6:08 AM Millie Anne RN Registered Nurse Sign                  Procedure Notes     No notes of this type exist for this encounter.         Progress Notes - Therapies (Notes from 01/26/18 through 01/29/18)      Progress Notes by Rachel King PT at 1/29/2018  3:08 PM     Author:  Rachel King PT Service:  (none) Author Type:  Physical Therapist    Filed:  1/29/2018  3:08 PM Date of Service:  1/29/2018  3:08 PM Creation Time:  1/29/2018  3:08 PM    Status:  Signed :  Rachel King PT (Physical Therapist)          01/29/18 1310   Quick Adds   Type of Visit Initial PT Evaluation   Living Environment   Lives With alone   Living Arrangements apartment   Home Accessibility no concerns   Number of Stairs to Enter Home 0   Number of Stairs Within Home 0   Living Environment Comment walk-in shower, where pt lives there are not many safe intersections to cross the street, pt reports  neighborhood is not very safe. Her apt is very cluttered, she brings back things from [employer] nearby regularly. She acknowledges she has many unneeded items and that she could not even walk her hallway with a walker due to the clutter.    Self-Care   Dominant Hand right   Usual Activity Tolerance moderate   Current Activity Tolerance fair  (to moderate)   Regular Exercise no   Equipment Currently Used at Home grab bar;shower chair   Activity/Exercise/Self-Care Comment Pt has ILS worker who provides support, 3 hrs  for cleaning, laundry. Pt reports showering a challenge and she needs more help. She is not able to wash her back well with current set-up. Reports cooking and keeping areas clean is difficult. She does not feel well organized and her environment is overly cluttered to the point she cannot walk through with a walker as she has been previously recommended to do. Report she does not feel she has enough support at home. At times has fallen and been stuck on the floor because she has not had a way up (history B knee replacements, unable to kneel), too weak to assist herself up.    Functional Level Prior   Ambulation 0-->independent   Transferring 0-->independent   Toileting 1-->assistive equipment  (grab bar)   Bathing 1-->assistive equipment  (shower chair, hand-held shower head)   Dressing 0-->independent   Eating 0-->independent   Communication 0-->understands/communicates without difficulty   Swallowing 0-->swallows foods/liquids without difficulty   Cognition 0 - no cognition issues reported   Fall history within last six months yes   Number of times patient has fallen within last six months 10   Which of the above functional risks had a recent onset or change? ambulation;transferring;bathing;eating   Prior Functional Level Comment Reports decline in functional strength. Also has tried to walk outside with a cane but has not felt safe due to the weather recently.    General Information    Onset of Illness/Injury or Date of Surgery - Date 01/27/18   Referring Physician Valentino Jimenez MD   Patient/Family Goals Statement To learn more about custodial, pt feels this may be better setting for her.    Pertinent History of Current Problem (include personal factors and/or comorbidities that impact the POC) Lynda Delgadillo is a 72 year old woman w/ PMx of chronic lymphedema, urinary incontinence, bipolar disorder, schizophrenia, and anxiety who presents for weakness, falls, and inability to thrive in home environment. She previously refused TCU after recent admission for pneumonia, but now believes she needs TCU or nursing home.    Precautions/Limitations fall precautions   General Info Comments activity: up with assist   Cognitive Status Examination   Level of Consciousness alert   Follows Commands and Answers Questions 100% of the time   Personal Safety and Judgment intact;impaired  (refuses supplemental O2 when sats 84%, but now using walker )   Cognitive Comment reports she needs further emotional support, has had depression and a lot of inactivity at home due to this   Pain Assessment   Patient Currently in Pain No   Posture    Posture Comments slight reduced upright posture   Range of Motion (ROM)   ROM Comment no deficits   Strength   Strength Comments shoulder abd 4-/5 flex 4/5, hip flex 4-/5 otherwise no deficits noted   Bed Mobility   Bed Mobility Comments IND supine<>sit   Transfer Skills   Transfer Comments CGA-SBA sit<>stand, reduced safety noted with transfers   Gait   Gait Comments SBA with walker. Without walker less stable and speed increased   Balance   Balance Comments Timed up and Go scores: with walker pt scored 23.6 sec, without walker pt scored 14.6 sec (scores <13.5 sec indicate increased falling risk)   Coordination   Coordination no deficits were identified   General Therapy Interventions   Planned Therapy Interventions bed mobility training;gait training;neuromuscular  "re-education;ROM;stretching;strengthening;transfer training;progressive activity/exercise;home program guidelines   Clinical Impression   Criteria for Skilled Therapeutic Intervention yes, treatment indicated   PT Diagnosis weakness   Influenced by the following impairments reduced proximal strength, balance, safety, activity tolerance   Functional limitations due to impairments below baseline bed mobility, transfers, gait   Clinical Presentation Evolving/Changing   Clinical Presentation Rationale changing emotional and physical status   Clinical Decision Making (Complexity) Moderate complexity   Therapy Frequency` daily   Predicted Duration of Therapy Intervention (days/wks) 1 week   Anticipated Equipment Needs at Discharge (insert bedrail, 4WW)   Anticipated Discharge Disposition Transitional Care Facility;Home with Home Therapy;Home with Assist  (however home currently does not appear safe per clutter)   Risk & Benefits of therapy have been explained Yes   Patient, Family & other staff in agreement with plan of care Yes   [facility] \"6 Clicks\"   2016, Trustees of [facility], under license to [employer].  All rights reserved.   6 Clicks Short Forms Basic Mobility Inpatient Short Form   [facility]  \"6 Clicks\" V.2 Basic Mobility Inpatient Short Form   1. Turning from your back to your side while in a flat bed without using bedrails? 4 - None   2. Moving from lying on your back to sitting on the side of a flat bed without using bedrails? 4 - None   3. Moving to and from a bed to a chair (including a wheelchair)? 3 - A Little   4. Standing up from a chair using your arms (e.g., wheelchair, or bedside chair)? 3 - A Little   5. To walk in hospital room? 3 - A Little   6. Climbing 3-5 steps with a railing? 3 - A Little   Basic Mobility Raw Score (Score out of 24.Lower scores equate to lower levels of function) 20   Total Evaluation Time   Total Evaluation Time (Minutes) 13[JB1.1] "        Revision History        User Key Date/Time User Provider Type Action    > JB1.1 1/29/2018  3:08 PM Rachel King, PT Physical Therapist Sign

## 2018-01-27 NOTE — ED NOTES
Pt BIBA after falling. She was down for 7 hours and found without clothes on. She states she has fallen over 10 times in the past 6 months. She does not use a cane or walker. VSS except needing oxygen upon arrival.

## 2018-01-27 NOTE — IP AVS SNAPSHOT
` `     UNIT [address] [facility] BANK: [phone]            Medication Administration Report for Lynda Delgadillo as of 01/29/18 1914   Legend:    Given Hold Not Given Due Canceled Entry Other Actions    Time Time (Time) Time  Time-Action       Inactive    Active    Linked        Medications 01/23/18 01/24/18 01/25/18 01/26/18 01/27/18 01/28/18 01/29/18    acetaminophen (TYLENOL) tablet 650 mg  Dose: 650 mg  Freq: EVERY 4 HOURS PRN Route: PO  PRN Reason: mild pain  Start: 01/27/18 1216   Admin Instructions: Alternate ibuprofen (if ordered) with acetaminophen.  Maximum acetaminophen dose from all sources = 75 mg/kg/day not to exceed 4 grams/day.               ARIPiprazole (ABILIFY) tablet 20 mg  Dose: 20 mg  Freq: DAILY Route: PO  Start: 01/28/18 0800         0849 (20 mg)-Given        0919 (20 mg)-Given           bisacodyl (DULCOLAX) Suppository 10 mg  Dose: 10 mg  Freq: DAILY PRN Route: RE  PRN Reason: constipation  Start: 01/27/18 1216   Admin Instructions: Hold for loose stools.  This is the third step of a three step constipation treatment.               buPROPion (WELLBUTRIN XL) 24 hr tablet 150 mg  Dose: 150 mg  Freq: EVERY MORNING Route: PO  Start: 01/28/18 0800   Admin Instructions: DO NOT CRUSH.<br><br>Take with 300 mg tablet for total 450 mg daily.          0850 (150 mg)-Given        0918 (150 mg)-Given           buPROPion (WELLBUTRIN XL) 24 hr tablet 300 mg  Dose: 300 mg  Freq: EVERY MORNING Route: PO  Start: 01/28/18 0800   Admin Instructions: DO NOT CRUSH.<br><br>Take with 150 mg tablet for total 450 mg daily.          0849 (300 mg)-Given        0919 (300 mg)-Given           divalproex (DEPAKOTE ER) 24 hr tablet 1,500 mg  Dose: 1,500 mg  Freq: AT BEDTIME Route: PO  Start: 01/27/18 2200   Admin Instructions: DO NOT CRUSH.         2207 (1,500 mg)-Given        2057 (1,500 mg)-Given               [ ] 2200           DULoxetine (CYMBALTA) EC capsule 60 mg  Dose: 60 mg  Freq: DAILY Route: PO  Start: 01/27/18 1232         (1533)-Not Given        0849 (60 mg)-Given        0919 (60 mg)-Given           enoxaparin (LOVENOX) injection 40 mg  Dose: 40 mg  Freq: EVERY 24 HOURS Route: SC  Start: 01/27/18 1300   Admin Instructions: HOLD if platelet count falls below 50% of baseline or less than 100,000/ L and notify provider.         1533 (40 mg)-Given        1909 (40 mg)-Given        1456 (40 mg)-Given           hydrOXYzine (ATARAX) tablet 10 mg  Dose: 10 mg  Freq: ONCE Route: PO  Start: 01/28/18 2230         (2239)-Not Given            LORazepam (ATIVAN) tablet 0.5 mg  Dose: 0.5 mg  Freq: AT BEDTIME Route: PO  Start: 01/27/18 2200        2207 (0.5 mg)-Given        2057 (0.5 mg)-Given               [ ] 2200           melatonin tablet 1 mg  Dose: 1 mg  Freq: AT BEDTIME PRN Route: PO  PRN Reason: sleep  Start: 01/27/18 1216   Admin Instructions: Do not give unless at least 6 hours of uninterrupted sleep is expected.               naloxone (NARCAN) injection 0.1-0.4 mg  Dose: 0.1-0.4 mg  Freq: EVERY 2 MIN PRN Route: IV  PRN Reason: opioid reversal  Start: 01/27/18 1216   Admin Instructions: For respiratory rate LESS than or EQUAL to 8.  Partial reversal dose:  0.1 mg titrated q 2 minutes for Analgesia Side Effects Monitoring Sedation Level of 3 (frequently drowsy, arousable, drifts to sleep during conversation).Full reversal dose:  0.4 mg bolus for Analgesia Side Effects Monitoring Sedation Level of 4 (somnolent, minimal or no response to stimulation).  For ordered doses up to 2mg give IVP. Give each 0.4mg over 15 seconds in emergency situations. For non-emergent situations further dilute in 9mL of NS to facilitate titration of response.               ondansetron (ZOFRAN-ODT) ODT tab 4 mg  Dose: 4 mg  Freq: EVERY 6 HOURS PRN Route: PO  PRN Reasons: nausea,vomiting  Start: 01/27/18 1216   Admin Instructions: This is Step 1 of nausea and vomiting management.  If nausea not resolved in 15 minutes, go to Step 2 prochlorperazine (COMPAZINE).  Do not push through foil backing. Peel back foil and gently remove. Place on tongue immediately. Administration with liquid unnecessary  With dry hands, peel back foil backing and gently remove tablet; do not push oral disintegrating tablet through foil backing; administer immediately on tongue and oral disintegrating tablet dissolves in seconds; then swallow with saliva; liquid not required.              Or  ondansetron (ZOFRAN) injection 4 mg  Dose: 4 mg  Freq: EVERY 6 HOURS PRN Route: IV  PRN Reasons: nausea,vomiting  Start: 01/27/18 1216   Admin Instructions: This is Step 1 of nausea and vomiting management.  If nausea not resolved in 15 minutes, go to Step 2 prochlorperazine (COMPAZINE).  Irritant. For ordered doses up to 4 mg, give IV Push undiluted over 2-5 minutes.               polyethylene glycol (MIRALAX/GLYCOLAX) Packet 17 g  Dose: 17 g  Freq: DAILY PRN Route: PO  PRN Reason: constipation  Start: 01/27/18 1216   Admin Instructions: Give in 8oz of  water, juice, or soda. Hold for loose stools.  This is the second step of a three step constipation treatment.  1 Packet = 17 grams. Mixed prescribed dose in 8 ounces of water. Follow with 8 oz. of water.               senna-docusate (SENOKOT-S;PERICOLACE) 8.6-50 MG per tablet 1 tablet  Dose: 1 tablet  Freq: 2 TIMES DAILY PRN Route: PO  PRN Reason: constipation  Start: 01/27/18 1216   Admin Instructions: If no bowel movement in 24 hours, increase to 2 tablets PO.  Hold for loose stools.  This is the first step of a three step constipation treatment.              Or  senna-docusate (SENOKOT-S;PERICOLACE) 8.6-50 MG per tablet 2 tablet  Dose: 2 tablet  Freq: 2 TIMES DAILY PRN Route: PO  PRN Reason: constipation  Start: 01/27/18 1216   Admin Instructions: Hold for loose stools.  This is the first step of a three step constipation treatment.               tolterodine (DETROL LA) 24 hr capsule 2 mg  Dose: 2 mg  Freq: DAILY Route: PO  Start: 01/27/18 1230   Admin  Instructions: DO NOT CRUSH.         (2882)-Not Given        0849 (2 mg)-Given        0919 (2 mg)-Given          Completed Medications  Medications 01/23/18 01/24/18 01/25/18 01/26/18 01/27/18 01/28/18 01/29/18         Dose: 1,000 mL  Freq: ONCE Route: IV  Start: 01/27/18 0922   End: 01/27/18 1004        1004 (1,000 mL)-New Bag            Discontinued Medications  Medications 01/23/18 01/24/18 01/25/18 01/26/18 01/27/18 01/28/18 01/29/18         Dose: 25 mg  Freq: EVERY MORNING Route: PO  Start: 01/28/18 0800   End: 01/27/18 1540        1540-Med Discontinued

## 2018-01-27 NOTE — IP AVS SNAPSHOT
[address] [address]    Phone:  [phone]                                       After Visit Summary   1/27/2018    Lynda Delgadillo    MRN: [id number]           After Visit Summary Signature Page     I have received my discharge instructions, and my questions have been answered. I have discussed any challenges I see with this plan with the nurse or doctor.    ..........................................................................................................................................  Patient/Patient Representative Signature      ..........................................................................................................................................  Patient Representative Print Name and Relationship to Patient    ..................................................               ................................................  Date                                            Time    ..........................................................................................................................................  Reviewed by Signature/Title    ...................................................              ..............................................  Date                                                            Time

## 2018-01-27 NOTE — PROGRESS NOTES
"SPIRITUAL HEALTH SERVICES  SPIRITUAL ASSESSMENT Progress Note  [facility] ([place]) 7B    REFERRAL SOURCE:  initiated visit.    Reviewed documentation. Reflective conversation shared with Ms Delgadillo. I introduced myself to Ms Delgadillo when she was wheeled on to the unit and parked near me in the garcia while her room was being arranged for her. Ms Delgadillo described having come to the hospital because she \"was crawling on the floor for seven hours... I think I did the right thing to come here.\"    Explained Spiritual Health Services, offered our support for her healing, and explained how to ask for a . She will call for us when needed.    Ms Delgadillo explained that she was raised Amish, has worshipped in Jain and Yazdanism contexts, and is currently not affiliated with a specific Adventism. She maintains her spirituality through Bible study.    PLAN: I will follow up at my next shift, if Ms Delgadillo remains on 7B, to offer emotional support.                                                                                                                                           Darren Agarwal   Intern  Pager [phone]    "

## 2018-01-27 NOTE — PHARMACY-ADMISSION MEDICATION HISTORY
Admission medication history interview status for the 1/27/2018 admission is complete. See Epic admission navigator for allergy information, pharmacy, prior to admission medications and immunization status.     Medication history interview sources:  Patient, [facility] [employer], Med History note 1/14/18    Changes made to PTA medication list (reason)  Added: None  Deleted:   - Guaifenesin 600 mg 12hr tablet BID: Patient stated she is no longer taking.  Changed:   - Aripiprazole: Changed 25 mg daily to 20 mg daily per patient. Patient stated she fills with [facility] pharmacy, but they were unable to be reached. Confirmed dose with [facility] [employer].    Additional medication history information (including reliability of information, actions taken by pharmacist):  Patient was a moderate historian. She struggled with some medication doses. [facility] [employer] was used to confirm doses as patient stated she normally fills with [facility]. [facility] pharmacy could not be reached by phone ([phone]).  Additionally, a medication history note from 1/14/18 was used to further confirm the patient's medications. Patient confirmed receiving the flu shot this season.       Prior to Admission medications    Medication Sig Last Dose Taking? Auth Provider   ARIPIPRAZOLE PO Take 20 mg by mouth daily 1/26/2018 at Unknown time Yes Unknown, Entered By History   tolterodine (DETROL LA) 2 MG 24 hr capsule Take 1 capsule (2 mg) by mouth daily 1/26/2018 at Unknown time Yes Thomas Adams MD   buPROPion (WELLBUTRIN XL) 150 MG 24 hr tablet Take 150 mg by mouth every morning With one 300 mg tablet for a total dose of 450 mg by mouth daily. 1/26/2018 at Unknown time Yes Unknown, Entered By History   buPROPion (WELLBUTRIN XL) 300 MG 24 hr tablet Take 300 mg by mouth every morning With one 150 mg tablet for a total dose of 450 mg by mouth daily. 1/26/2018 at Unknown time Yes  Unknown, Entered By History   divalproex (DEPAKOTE ER) 500 MG 24 hr tablet Take 1,500 mg by mouth At Bedtime 1/25/2018 Yes Unknown, Entered By History   DULoxetine (CYMBALTA) 60 MG EC capsule Take 60 mg by mouth daily 1/26/2018 at Unknown time Yes Unknown, Entered By History   LORazepam (ATIVAN) 0.5 MG tablet Take 1 mg by mouth At Bedtime 1/25/2018 Yes Unknown, Entered By History         Medication history completed by: Micki Doll

## 2018-01-27 NOTE — IP AVS SNAPSHOT
"` `           UNIT [address] [facility]: [phone]                                              INTERAGENCY TRANSFER FORM - NURSING   2018                    Hospital Admission Date: 2018  DANIEL WYMAN   : 1945  Sex: Female        Attending Provider: Valentino Jimenez MD     Allergies:  Dilaudid Cough, Haldol, Lactose, Morphine, Neurontin [Gabapentin], Penicillins    Infection:  None   Service:  INTERNAL MED    Ht:  1.727 m (5' 8\")   Wt:  117.2 kg (258 lb 6.4 oz)   Admission Wt:  116.8 kg (257 lb 8 oz)    BMI:  39.29 kg/m 2   BSA:  2.37 m 2            Patient PCP Information     Provider PCP Type    Owen Jurado MD General      Current Code Status     Date Active Code Status Order ID Comments User Context       Prior      Code Status History     Date Active Date Inactive Code Status Order ID Comments User Context    2018  3:26 PM  DNR/DNI [id number]  Simeon Machado MD Outpatient    2018  2:54 PM 2018  3:26 PM DNR/DNI [id number]  Jeferson Portillo MD Inpatient    2018 12:16 PM 2018  2:54 PM Full Code [id number]  Jeferson Portillo MD Inpatient    2018 10:20 AM 2018 12:16 PM Full Code [id number]  Thomas Adams MD Outpatient    2017 10:10 AM 2018 10:20 AM Full Code [id number]  Nathalie Jolley APRN CNP Outpatient    2017 10:14 PM 2017 10:10 AM Full Code [id number]  Woo Lobo PA Inpatient    2016  8:12 AM 2016  4:31 PM Full Code [id number]  Raza Saucedo MD Inpatient    2016  6:09 PM 2016  3:32 PM Full Code [id number]  Debra Espana RN Inpatient    2014  8:47 PM 2014  6:34 PM Full Code [id number]  Alba Duran RN Inpatient    2012  8:21 PM 6/10/2012  6:19 PM Full Code [id number]  Abhishek Munoz NP Inpatient      Advance Directives        Scanned docmt in ACP Activity?           No scanned doc        Hospital Problems as of " 1/29/2018              Priority Class Noted POA    Weakness Medium  1/27/2018 Yes      Non-Hospital Problems as of 1/29/2018              Priority Class Noted    Bronchitis Medium  6/9/2012    COPD exacerbation (H) Medium  6/9/2012    Depression Medium  8/2/2014    Schizoaffective disorder, bipolar type (H) Medium  8/22/2016    Community acquired bacterial pneumonia Medium  1/15/2018      Immunizations     Name Date      Influenza (High Dose) 3 valent vaccine 11/17/17          END      ASSESSMENT     Discharge Profile Flowsheet     EXPECTED DISCHARGE     COMMUNICATION ASSESSMENT      Expected Discharge Date  01/29/18 (TCU) 01/29/18 1044   Patient's communication style  spoken language (English or Bilingual) 01/27/18 0554    DISCHARGE NEEDS ASSESSMENT     SKIN      Concerns To Be Addressed  all concerns addressed in this encounter 01/29/18 1637   Inspection of bony prominences  Full 01/29/18 1337    Equipment Currently Used at Home  grab bar;shower chair 01/29/18 1439   Inspection under devices  Full 01/29/18 1337    Discharge Facility/Level Of Care Needs  assisted living facility 01/29/18 1637   Skin WDL  WDL 01/29/18 1337    Transportation Available  agency transportation;van, wheelchair accessible 01/29/18 1637   Full except areas not inspected   Spine;Hip, left;Hip, right;Buttock, left;Buttock, right;Sacrum;Coccyx 01/29/18 0138    Discharge Disposition  skilled nursing facility 01/29/18 1637   SAFETY      GASTROINTESTINAL (ADULT,PEDIATRIC,OB)     Safety WDL  safety factors 01/29/18 1337    GI WDL  WDL 01/29/18 1337   Safety Factors  bed in position other than low;bed in low position;wheels locked;call light in reach;upper side rails raised x 2;ID band on 01/29/18 1337    Passing flatus  yes 01/29/18 1337   All Alarms  alarm(s) activated and audible 01/29/18 1337                 Assessment WDL (Within Defined Limits) Definitions           Safety WDL     Effective: 09/28/15    Row Information: <b>WDL  "Definition:</b> Bed in low position, wheels locked; call light in reach; upper side rails up x 2; ID band on<br> <font color=\"gray\"><i>Item=AS safety wdl>>List=AS safety wdl>>Version=F14</i></font>      Skin WDL     Effective: 09/28/15    Row Information: <b>WDL Definition:</b> Warm; dry; intact; elastic; without discoloration; pressure points without redness<br> <font color=\"gray\"><i>Item=AS skin wdl>>List=AS skin wdl>>Version=F14</i></font>      Vitals     Vital Signs Flowsheet     VITAL SIGNS     BSA (Calculated - sq m)  2.37 01/27/18 1149    Temp  98.3  F (36.8  C) 01/29/18 0828   BMI (Calculated)  39.23 01/27/18 1149    Temp src  Oral 01/29/18 0828   CLINICALLY ALIGNED PAIN ASSESSMENT (CAPA) ([facility], [facility] ADULTS ONLY)      Resp  18 01/29/18 0828   Comfort  comfortably manageable 01/27/18 0600    Heart Rate  82 01/29/18 0828   Change in Pain  getting better 01/27/18 0600    Pulse/Heart Rate Source  Monitor 01/29/18 0828   Pain Control  fully effective 01/27/18 0600    BP  116/72 01/29/18 0828   Functioning  can do most things, but pain gets in the way of some 01/27/18 0600    BP Location  Right arm 01/29/18 0828   Sleep  normal sleep 01/27/18 0600    OXYGEN THERAPY     KIMBER COMA SCALE      SpO2  93 % 01/29/18 0828   Best Eye Response  4-->(E4) spontaneous 01/29/18 1337    O2 Device  Nasal cannula 01/29/18 0828   Best Motor Response  6-->(M6) obeys commands 01/29/18 1337    Oxygen Delivery  2 LPM 01/29/18 0828   Best Verbal Response  5-->(V5) oriented 01/29/18 1337    PAIN/COMFORT     [place] Coma Scale Score  15 01/29/18 1337    Patient Currently in Pain  sleeping: patient not able to self report 01/29/18 0133   POSITIONING      HEIGHT AND WEIGHT     Body Position  independently positioning 01/29/18 1637    Height  1.727 m (5' 8\") 01/27/18 1149   Head of Bed (HOB)  HOB at 20-30 degrees 01/29/18 1637    Height Method  Stated 01/27/18 0600   Chair  Upright in chair 01/28/18 1913    Weight  117.2 kg " (258 lb 6.4 oz) 01/28/18 1655   DAILY CARE      Weight Method  Standing scale 01/28/18 1655   Activity Management  activity adjusted per tolerance 01/29/18 1337    Estimated Weight (From ED)  113.4 kg (250 lb) 01/27/18 0600   Activity Assistance Provided  assistance, 1 person 01/29/18 1337            Patient Lines/Drains/Airways Status    Active LINES/DRAINS/AIRWAYS     Name: Placement date: Placement time: Site: Days: Last dressing change:    Peripheral IV 01/15/18 Right;Dorsal Lower forearm 01/15/18   1440   Lower forearm   14     Wound 06/09/12 Lower;Anterior;Right right lower leg scab 06/09/12   1900      2059             Patient Lines/Drains/Airways Status    Active PICC/CVC     None            Intake/Output Detail Report     Date Intake   Output Net    Shift P.O. IV Piggyback Total Urine Total       Nanda 01/28/18 0700 - 01/28/18 1459 -- -- -- -- -- 0    Noc 01/28/18 1500 - 01/28/18 2359 -- -- -- 100 100 -100    Day 01/29/18 0000 - 01/29/18 0659 -- -- -- 375 375 -375    Nanda 01/29/18 0700 - 01/29/18 1459 -- -- -- -- -- 0    Noc 01/29/18 1500 - 01/29/18 2359 120 -- 120 -- -- 120      Last Void/BM       Most Recent Value    Urine Occurrence 1 at 01/29/2018 1000    Stool Occurrence 1 at 01/28/2018 0915      Case Management/Discharge Planning     Case Management/Discharge Planning Flowsheet     REFERRAL INFORMATION     Transportation Available  agency transportation;van, wheelchair accessible 01/29/18 1637    Arrived From  home or self-care 02/12/17 2236   Discharge Facility/Level Of Care Needs  assisted living facility 01/29/18 1637    LIVING ENVIRONMENT     FINAL RESOURCES      Lives With  alone 01/29/18 1439   Equipment Currently Used at Home  grab bar;shower chair 01/29/18 1439    Living Arrangements  apartment 01/29/18 1439   ABUSE RISK SCREEN      COPING/STRESS     QUESTION TO PATIENT:  Has a member of your family or a partner(now or in the past) intimidated, hurt, manipulated, or controlled you in any way?  no  01/27/18 0604    Major Change/Loss/Stressor  loss of independence;mental health condition 01/27/18 1335   QUESTION TO PATIENT: Do you feel safe going back to the place where you are living?  yes 01/27/18 0604    Patient Personal Strengths  able to adapt;assertive;expressive of emotions;expressive of needs;strong support system;patient reports none;self-reliant;resourceful 01/16/18 1558   OTHER      EXPECTED DISCHARGE     Are you depressed or being treated for depression?  Yes 01/27/18 1331    Expected Discharge Date  01/29/18 (TCU) 01/29/18 1044   HOMICIDE RISK      ASSESSMENT/CONCERNS TO BE ADDRESSED     Feels Like Hurting Others  no 01/27/18 0604    Concerns To Be Addressed  all concerns addressed in this encounter 01/29/18 1637   MH/ CAREGIVER      DISCHARGE PLANNING     Filed Complexity Screen Score  11 01/29/18 3369

## 2018-01-27 NOTE — PLAN OF CARE
Problem: Patient Care Overview  Goal: Plan of Care/Patient Progress Review  Outcome: No Change  VSS, afebrile. Denies pain. Up with assist x 1. Unsteady gait. Appetite good. UOP adequate. Fluids encouraged. Will continue with POC.

## 2018-01-27 NOTE — IP AVS SNAPSHOT
MRN:[id number]                      After Visit Summary   1/27/2018    Lynda Delgadillo    MRN: [id number]           Thank you!     Thank you for choosing [facility] for your care. Our goal is always to provide you with excellent care. Hearing back from our patients is one way we can continue to improve our services. Please take a few minutes to complete the written survey that you may receive in the mail after you visit with us. Thank you!        Patient Information     Date Of Birth          1945        About your hospital stay     You were admitted on:  January 27, 2018 You last received care in the:  [address] [facility]    You were discharged on:  January 29, 2018        Reason for your hospital stay       You were admitted due to a fall and continued weakness since your last hospital admission where you were diagnosed with pneumonia. At this admission the physical therapist worked with you and and suggested rehabilitation at a TCU.                  Who to Call     For medical emergencies, please call 911.  For non-urgent questions about your medical care, please call your primary care provider or clinic, [phone]          Attending Provider     Provider Specialty    Margarita Stevens MD Emergency Medicine    [facility] Medicine       Primary Care Provider Office Phone # Fax #    Owen Jurado [phone]      After Care Instructions     Activity - Up with nursing assistance           Advance Diet as Tolerated       Follow this diet upon discharge: Orders Placed This Encounter      Combination Diet Regular Diet Adult            General info for SNF       Length of Stay Estimate: Long Term Care  Condition at Discharge: Stable  Level of care:skilled   Rehabilitation Potential: Fair  Admission H&P remains valid and up-to-date: Yes  Recent Chemotherapy: N/A  Use Nursing Home Standing Orders: Yes            Mantoux instructions       Give  "two-step Mantoux (PPD) Per Facility Policy Yes            Oxygen - Nasal cannula       Lpm by nasal cannula to keep O2 sats 90% or greater.                  Follow-up Appointments     Follow Up and recommended labs and tests       Follow up with Nursing home physician or PCP for acute on chronically elevated CRP and age related cancer screenings.                  Additional Services     Occupational Therapy Adult Consult       Evaluate and treat as clinically indicated.    Reason:   Weakness, frequent falls at home, trouble walking            Physical Therapy Adult Consult       Evaluate and treat as clinically indicated.    Reason:  Weakness, frequent falls at home, trouble walking                  Pending Results     No orders found from 1/25/2018 to 1/28/2018.            Statement of Approval     Ordered          01/29/18 2170  I have reviewed and agree with all the recommendations and orders detailed in this document.  EFFECTIVE NOW     Approved and electronically signed by:  Valentino Jimenez MD           01/29/18 2620  I have reviewed and agree with all the recommendations and orders detailed in this document.  EFFECTIVE NOW     Approved and electronically signed by:  Valentino Jimenez MD             Admission Information     Date & Time Provider Department Dept. Phone    1/27/2018 Valentino Jimenez MD [address] [facility] [phone]      Your Vitals Were     Blood Pressure Temperature Respirations Height Weight Pulse Oximetry    116/72 (BP Location: Right arm) 98.3  F (36.8  C) (Oral) 18 1.727 m (5' 8\") 117.2 kg (258 lb 6.4 oz) 93%    BMI (Body Mass Index)                   39.29 kg/m2           [employer] Information     [employer] lets you send messages to your doctor, view your test results, renew your prescriptions, schedule appointments and more. To sign up, go to [email or web address] . Click on \"Log in\" on the left side of the screen, which will take you to the Welcome page. Then " "click on \"Sign up Now\" on the right side of the page.     You will be asked to enter the access code listed below, as well as some personal information. Please follow the directions to create your username and password.     Your access code is: [email or web address]  Expires: 2018  9:13 AM     Your access code will  in 90 days. If you need help or a new code, please call your [facility] clinic or [phone].        Care EveryWhere ID     This is your Care EveryWhere ID. This could be used by other organizations to access your [facility] medical records  [id number]        Equal Access to Services     [facility]: Azul Alvarado, reba neff, albert slater, sandra carlson. So [facility] [phone].    ATENCIÓN: Si habla español, tiene a smith disposición servicios gratuitos de asistencia lingüística. [facility] [phone].    We comply with applicable federal civil rights laws and [place] laws. We do not discriminate on the basis of race, color, national origin, age, disability, sex, sexual orientation, or gender identity.               Review of your medicines      CONTINUE these medicines which may have CHANGED, or have new prescriptions. If we are uncertain of the size of tablets/capsules you have at home, strength may be listed as something that might have changed.        Dose / Directions    buPROPion 150 MG 24 hr tablet   Commonly known as:  WELLBUTRIN XL   This may have changed:  Another medication with the same name was removed. Continue taking this medication, and follow the directions you see here.        Dose:  150 mg   Take 150 mg by mouth every morning With one 300 mg tablet for a total dose of 450 mg by mouth daily.   Refills:  0         CONTINUE these medicines which have NOT CHANGED        Dose / Directions    ARIPIPRAZOLE PO        Dose:  20 mg   Take 20 mg by mouth daily   Refills:  0       divalproex sodium extended-release 500 MG 24 hr tablet "   Commonly known as:  DEPAKOTE ER        Dose:  1500 mg   Take 1,500 mg by mouth At Bedtime   Refills:  0       DULoxetine 60 MG EC capsule   Commonly known as:  CYMBALTA        Dose:  60 mg   Take 60 mg by mouth daily   Refills:  0       LORazepam 0.5 MG tablet   Commonly known as:  ATIVAN        Dose:  1 mg   Take 1 mg by mouth At Bedtime   Refills:  0       tolterodine 2 MG 24 hr capsule   Commonly known as:  DETROL LA   Used for:  Stress incontinence of urine        Dose:  2 mg   Take 1 capsule (2 mg) by mouth daily   Quantity:  30 capsule   Refills:  0                Protect others around you: Learn how to safely use, store and throw away your medicines at www.disposemymeds.org.             Medication List: This is a list of all your medications and when to take them. Check marks below indicate your daily home schedule. Keep this list as a reference.      Medications           Morning Afternoon Evening Bedtime As Needed    ARIPIPRAZOLE PO   Take 20 mg by mouth daily   Last time this was given:  20 mg on 1/29/2018  9:19 AM                                buPROPion 150 MG 24 hr tablet   Commonly known as:  WELLBUTRIN XL   Take 150 mg by mouth every morning With one 300 mg tablet for a total dose of 450 mg by mouth daily.   Last time this was given:  300 mg on 1/29/2018  9:19 AM                                divalproex sodium extended-release 500 MG 24 hr tablet   Commonly known as:  DEPAKOTE ER   Take 1,500 mg by mouth At Bedtime   Last time this was given:  1,500 mg on 1/28/2018  8:57 PM                                DULoxetine 60 MG EC capsule   Commonly known as:  CYMBALTA   Take 60 mg by mouth daily   Last time this was given:  60 mg on 1/29/2018  9:19 AM                                LORazepam 0.5 MG tablet   Commonly known as:  ATIVAN   Take 1 mg by mouth At Bedtime   Last time this was given:  0.5 mg on 1/28/2018  8:57 PM                                tolterodine 2 MG 24 hr capsule   Commonly known as:   DETROL LA   Take 1 capsule (2 mg) by mouth daily   Last time this was given:  2 mg on 1/29/2018  9:19 AM                                          More Information        Fall Prevention  Falls often occur due to slipping, tripping or losing your balance. Millions of people fall every year and injure themselves. Here are ways to reduce your risk of falling again.    Think about your fall, was there anything that caused your fall that can be fixed, removed, or replaced?    Make your home safe by keeping walkways clear of objects you may trip over.    Use non-slip pads under rugs. Do not use area rugs or small throw rugs.    Use non-slip mats in bathtubs and showers.    Install handrails and lights on staircases.    Do not walk in poorly lit areas.    Do not stand on chairs or wobbly ladders.    Use caution when reaching overhead or looking upward. This position can cause a loss of balance.    Be sure your shoes fit properly, have non-slip bottoms and are in good condition.     Wear shoes both inside and out. Avoid going barefoot or wearing slippers.    Be cautious when going up and down stairs, curbs, and when walking on uneven sidewalks.    If your balance is poor, consider using a cane or walker.    If your fall was related to alcohol use, stop or limit alcohol intake.     If your fall was related to use of sleeping medicines, talk to your doctor about this. You may need to reduce your dosage at bedtime if you awaken during the night to go to the bathroom.      To reduce the need for nighttime bathroom trips:    Avoid drinking fluids for several hours before going to bed    Empty your bladder before going to bed    Men can keep a urinal at the bedside    Stay as active as you can. Balance, flexibility, strength, and endurance all come from exercise. They all play a role in preventing falls. Ask your healthcare provider which types of activity are right for you.    Get your vision checked on a regular basis.    If  you have pets, know where they are before you stand up or walk so you don't trip over them.    Use night lights.  Date Last Reviewed: 11/5/2015 2000-2017 [employer]. [address] [place] [address], [place] [address]. All rights reserved. This information is not intended as a substitute for professional medical care. Always follow your healthcare professional's instructions.

## 2018-01-27 NOTE — IP AVS SNAPSHOT
"    [address] [facility]: [phone]                                              INTERAGENCY TRANSFER FORM - PHYSICIAN ORDERS   2018                    Hospital Admission Date: 2018  DANIEL WYMAN   : 1945  Sex: Female        Attending Provider: Valentino Jimenez MD     Allergies:  Dilaudid Cough, Haldol, Lactose, Morphine, Neurontin [Gabapentin], Penicillins    Infection:  None   Service:  INTERNAL MED    Ht:  1.727 m (5' 8\")   Wt:  117.2 kg (258 lb 6.4 oz)   Admission Wt:  116.8 kg (257 lb 8 oz)    BMI:  39.29 kg/m 2   BSA:  2.37 m 2            Patient PCP Information     Provider PCP Type    Owen Jurado MD General      ED Clinical Impression     Diagnosis Description Comment Added By Time Added    Weakness [R53.1] Weakness [R53.1]  Margarita Stevens MD 2018  9:18 AM    Fall, initial encounter [W19.XXXA] Fall, initial encounter [W19.XXXA]  Margarita Stevens MD 2018  9:18 AM      Hospital Problems as of 2018              Priority Class Noted POA    Weakness Medium  2018 Yes      Non-Hospital Problems as of 2018              Priority Class Noted    Bronchitis Medium  2012    COPD exacerbation (H) Medium  2012    Depression Medium  2014    Schizoaffective disorder, bipolar type (H) Medium  2016    Community acquired bacterial pneumonia Medium  1/15/2018      Code Status History     Date Active Date Inactive Code Status Order ID Comments User Context    2018  3:26 PM  DNR/DNI [id number]  Simeon Machado MD Outpatient    2018  2:54 PM 2018  3:26 PM DNR/DNI [id number]  Jeferson Portillo MD Inpatient    2018 12:16 PM 2018  2:54 PM Full Code [id number]  Jeferson Portillo MD Inpatient    2018 10:20 AM 2018 12:16 PM Full Code [id number]  Thomas Adams MD Outpatient    2017 10:10 AM 2018 10:20 AM Full Code [id number]  Nathalie Jolley, JAC CNP Outpatient    2017 " 10:14 PM 2/13/2017 10:10 AM Full Code [id number]  Woo Lobo PA Inpatient    12/25/2016  8:12 AM 12/26/2016  4:31 PM Full Code [id number]  Raza Saucedo MD Inpatient    8/22/2016  6:09 PM 8/26/2016  3:32 PM Full Code [id number]  Debra Espana, RN Inpatient    8/2/2014  8:47 PM 8/11/2014  6:34 PM Full Code [id number]  Alba Duran, JOSE Inpatient    6/9/2012  8:21 PM 6/10/2012  6:19 PM Full Code [id number]  Abhishek Munoz NP Inpatient         Medication Review      CONTINUE these medications which may have CHANGED, or have new prescriptions. If we are uncertain of the size of tablets/capsules you have at home, strength may be listed as something that might have changed.        Dose / Directions Comments    buPROPion 150 MG 24 hr tablet   Commonly known as:  WELLBUTRIN XL   This may have changed:  Another medication with the same name was removed. Continue taking this medication, and follow the directions you see here.        Dose:  150 mg   Take 150 mg by mouth every morning With one 300 mg tablet for a total dose of 450 mg by mouth daily.   Refills:  0          CONTINUE these medications which have NOT CHANGED        Dose / Directions Comments    ARIPIPRAZOLE PO        Dose:  20 mg   Take 20 mg by mouth daily   Refills:  0        divalproex sodium extended-release 500 MG 24 hr tablet   Commonly known as:  DEPAKOTE ER        Dose:  1500 mg   Take 1,500 mg by mouth At Bedtime   Refills:  0        DULoxetine 60 MG EC capsule   Commonly known as:  CYMBALTA        Dose:  60 mg   Take 60 mg by mouth daily   Refills:  0        LORazepam 0.5 MG tablet   Commonly known as:  ATIVAN        Dose:  1 mg   Take 1 mg by mouth At Bedtime   Refills:  0        tolterodine 2 MG 24 hr capsule   Commonly known as:  DETROL LA   Used for:  Stress incontinence of urine        Dose:  2 mg   Take 1 capsule (2 mg) by mouth daily   Quantity:  30 capsule   Refills:  0                Summary of Visit     Reason for  your hospital stay       You were admitted due to a fall and continued weakness since your last hospital admission where you were diagnosed with pneumonia. At this admission the physical therapist worked with you and and suggested rehabilitation at a TCU.             After Care     Activity - Up with nursing assistance           Advance Diet as Tolerated       Follow this diet upon discharge: Orders Placed This Encounter      Combination Diet Regular Diet Adult       General info for SNF       Length of Stay Estimate: Long Term Care  Condition at Discharge: Stable  Level of care:skilled   Rehabilitation Potential: Fair  Admission H&P remains valid and up-to-date: Yes  Recent Chemotherapy: N/A  Use Nursing Home Standing Orders: Yes       Mantoux instructions       Give two-step Mantoux (PPD) Per Facility Policy Yes             Procedures     Oxygen - Nasal cannula       Lpm by nasal cannula to keep O2 sats 90% or greater.             Referrals     Occupational Therapy Adult Consult       Evaluate and treat as clinically indicated.    Reason:   Weakness, frequent falls at home, trouble walking       Physical Therapy Adult Consult       Evaluate and treat as clinically indicated.    Reason:  Weakness, frequent falls at home, trouble walking             Follow-Up Appointment Instructions     Future Labs/Procedures    Follow Up and recommended labs and tests     Comments:    Follow up with Nursing home physician or PCP for acute on chronically elevated CRP and age related cancer screenings.      Follow-Up Appointment Instructions     Follow Up and recommended labs and tests       Follow up with Nursing home physician or PCP for acute on chronically elevated CRP and age related cancer screenings.             Statement of Approval     Ordered          01/29/18 1557  I have reviewed and agree with all the recommendations and orders detailed in this document.  EFFECTIVE NOW     Approved and electronically signed by:   Valentino Jimenez MD           01/29/18 1535  I have reviewed and agree with all the recommendations and orders detailed in this document.  EFFECTIVE NOW     Approved and electronically signed by:  Valentino Jimenez MD
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
## 2018-01-27 NOTE — ED NOTES
[facility], [place]   ED Nurse to Floor Handoff     Lynda Delgadillo is a 72 year old female who speaks English and lives alone,  in a home  They arrived in the ED by ambulance from home    ED Chief Complaint: Fall    ED Dx;   Final diagnoses:   Weakness   Fall, initial encounter         Needed?: No    Allergies:   Allergies   Allergen Reactions     Dilaudid Cough Other (See Comments)     Psychosis and agitation     Haldol Swelling     Tongue swelling     Lactose GI Disturbance     Morphine      I have a anger reaction.     Neurontin [Gabapentin] Unknown     Patient states she just refuses to take it after reading side effect profile     Penicillins Hives   .  Past Medical Hx:   Past Medical History:   Diagnosis Date     Arthritis     right neck  pain radiating down to numb fingers     Bipolar disorder (H)      LOC (loss of consciousness) (H) age 24    tripped down outside stairs and head hit concrete     Lymphedema age 64    both ankles and feet      Baseline Mental status: WDL  Current Mental Status changes: at basesline    Infection: No  Sepsis suspected: No  Isolation type: No active isolations     Activity level - Baseline/Home:  Independent  Activity Level - Current:   Stand with Assist of 2    Bariatric equipment needed?: Yes    In the ED these meds were given:   Medications   0.9% sodium chloride BOLUS (1,000 mLs Intravenous New Bag 1/27/18 1004)       Drips running?  No    Home pump or pre-existing LDA's present? No    Labs results:   Labs Ordered and Resulted from Time of ED Arrival Up to the Time of Departure from the ED   CBC WITH PLATELETS DIFFERENTIAL - Abnormal; Notable for the following:        Result Value    RBC Count 3.66 (*)     Hemoglobin 10.9 (*)     MCHC 29.9 (*)     All other components within normal limits   COMPREHENSIVE METABOLIC PANEL - Abnormal; Notable for the following:     Albumin 2.4 (*)     Protein Total 6.5 (*)     All other components within  normal limits   INR   LIPASE   LACTIC ACID WHOLE BLOOD   TROPONIN I   CK TOTAL   UA MACROSCOPIC WITH REFLEX TO MICRO AND CULTURE   INFLUENZA A/B ANTIGEN       Imaging Studies:   Recent Results (from the past 24 hour(s))   XR Chest 2 Views    Narrative    EXAM: XR CHEST 2 VW  1/27/2018 8:00 AM     HISTORY:  Fall, weakness, history of pneumonia       COMPARISON:  Chest radiograph 1/14/2018    FINDINGS:  PA and lateral views of the chest are obtained.    The cardiomediastinal silhouette is unremarkable. Trace bilateral  pleural effusions. No pneumothorax. When compared to radiograph  1/14/2018 there are persistent, but improved right midlung and lower  lung opacities. Increased left lower lung linear opacities.    Degenerative changes of the glenohumeral joints bilaterally.  Degenerative changes of the thoracic spine. The visualized upper  abdomen is unremarkable. No acute osseous abnormalities.      Impression    IMPRESSION: Compared to radiograph 1/14/2018, right mid lung and upper  lung opacities persist, but appear improved. New linear left basilar  opacities are favored to represent atelectasis versus infection.    I have personally reviewed the examination and initial interpretation  and I agree with the findings.    RETA HARDY   CT Head w/o Contrast    Narrative    CT HEAD W/O CONTRAST 1/27/2018 8:27 AM    Provided History: fall, hit back of head    Comparison: None available.    Technique: Using multidetector thin collimation helical acquisition  technique, axial, coronal and sagittal CT images from the skull base  to the vertex were obtained without intravenous contrast.     Findings:    No intracranial hemorrhage, mass effect, or midline shift. The  ventricles are proportionate to the cerebral sulci.  The gray to white  matter differentiation of the cerebral hemispheres is preserved. The  basal cisterns are patent.    The visualized paranasal sinuses are clear. The mastoid air cells are  clear. No  "external soft tissue swelling. No evidence of calvarial  fracture. Hyperostosis frontalis interna. The orbits are unremarkable.       Impression    Impression: No acute intracranial pathology.    I have personally reviewed the examination and initial interpretation  and I agree with the findings.    MAKENZIE MIRAMONTES MD       Recent vital signs:   /56  Temp 99.3  F (37.4  C) (Oral)  Resp 16  Ht 1.727 m (5' 8\")  SpO2 97%    Cardiac Rhythm: Normal Sinus  Pt needs tele? No  Skin/wound Issues: None    Code Status: Full Code    Pain control: pt had none    Nausea control: pt had none    Abnormal labs/tests/findings requiring intervention:  None    Family present during ED course? No   Family Comments/Social Situation comments: Pt has fallen 10 times at home in the last month per patient, but does not want to live in an assisted living. Social work well aware of patient     Tasks needing completion: Urine collection, pt attempted to urinate, but was unable. 1 liter bolus given     Hao Michele RN  ascom-- [phone] [facility]  [phone] [facility] ED    "

## 2018-01-27 NOTE — ED PROVIDER NOTES
"  History     Chief Complaint   Patient presents with     Fall     HPI  Lynda Delgadillo is a 72 year old female with a past medical history of lymphedema and bipolar disorder who who presents after a fall.  The patient apparently spent about 7 hours crawling around on the floor and was unable to get up.  She tells me she has been falling for the past several days.  She has been unable to walk for the past 2-3 days and she lives alone.  She states she has no strength in her legs.  She fell last night walking out of the bathroom and hit the back of her head.  Nurse's notes state that she was found in her apartment without clothing on.  She states she was recently treated for pneumonia.  She currently states she hurts all over including her head.  She denies chest pain however or shortness of breath.  She states she has cellulitis of her lower extremities and finished her last antibiotic dose yesterday.  She was unable to take her medications last night.  Social: lives alone, former smoker  I have reviewed the Medications, Allergies, Past Medical and Surgical History, and Social History in the Epic system.    Review of Systems   Constitutional: Negative for fever.   Eyes: Negative.    Respiratory: Negative for shortness of breath.    Cardiovascular: Negative for chest pain.   Gastrointestinal: Negative for abdominal pain, nausea and vomiting.   Genitourinary: Negative for flank pain.   Musculoskeletal: Positive for gait problem. Negative for neck pain.        See hpi   Skin: Negative for rash.   Neurological: Positive for weakness. Negative for headaches.   Psychiatric/Behavioral: Negative.    All other systems reviewed and are negative.      Physical Exam   BP: 106/89  Heart Rate: 87  Temp: 99.3  F (37.4  C)  Resp: 18  Height: 172.7 cm (5' 8\")  SpO2: (!) 89 %      Physical Exam  Physical Exam   Constitutional:   Elderly female, appears slightly uncomfortable  HENT:   Head: Normocephalic, tender to posterior scalp " with no appreciable contusion  Eyes: Conjunctivae are normal. Pupils are equal, round, and reactive to light.    pharynx has no erythema or exudate, mucous membranes are moist  Neck:   no adenopathy, no bony tenderness  Cardiovascular: regular rate and rhythm without murmurs or gallops  Pulmonary/Chest: Clear to auscultation bilaterally, with no wheezes or retractions. No respiratory distress.  GI: Soft with good bowel sounds.  Non-tender, non-distended, with no guarding, no rebound, no peritoneal signs.   Back:  No bony or CVA tenderness   Musculoskeletal: Pitting edema to BLE, redness and heat to bilateral anterior knees lymphedema of lower extremities  Skin: Skin is warm and dry. No rash noted.   Neurological: alert and oriented to person, place, and time. Nonfocal exam, JAY, speech is slightly slow. GCS is 15  Psychiatric:  normal mood and affect.   ED Course     ED Course     Procedures             EKG Interpretation:      Interpreted by Margarita Stevens  Time reviewed: 0717 am  Symptoms at time of EKG: see hpi   Rhythm: normal sinus   Rate: normal  Axis: normal  Ectopy: none  Conduction: normal  ST Segments/ T Waves: No ST-T wave changes  Q Waves: none  Comparison to prior: Unchanged from 1/14/2018    Clinical Impression: NSR rate of 84 bpm with no acute ischemic changes          Critical Care time:  none            Results for orders placed or performed during the hospital encounter of 01/27/18 (from the past 24 hour(s))   EKG 12-lead, tracing only   Result Value Ref Range    Interpretation ECG Click View Image link to view waveform and result    CBC with platelets differential   Result Value Ref Range    WBC 10.4 4.0 - 11.0 10e9/L    RBC Count 3.66 (L) 3.8 - 5.2 10e12/L    Hemoglobin 10.9 (L) 11.7 - 15.7 g/dL    Hematocrit 36.4 35.0 - 47.0 %     78 - 100 fl    MCH 29.8 26.5 - 33.0 pg    MCHC 29.9 (L) 31.5 - 36.5 g/dL    RDW 13.5 10.0 - 15.0 %    Platelet Count 305 150 - 450 10e9/L    Diff Method  Automated Method     % Neutrophils 70.5 %    % Lymphocytes 14.5 %    % Monocytes 12.2 %    % Eosinophils 1.5 %    % Basophils 0.1 %    % Immature Granulocytes 1.2 %    Nucleated RBCs 0 0 /100    Absolute Neutrophil 7.3 1.6 - 8.3 10e9/L    Absolute Lymphocytes 1.5 0.8 - 5.3 10e9/L    Absolute Monocytes 1.3 0.0 - 1.3 10e9/L    Absolute Eosinophils 0.2 0.0 - 0.7 10e9/L    Absolute Basophils 0.0 0.0 - 0.2 10e9/L    Abs Immature Granulocytes 0.1 0 - 0.4 10e9/L    Absolute Nucleated RBC 0.0    INR   Result Value Ref Range    INR 1.12 0.86 - 1.14   Comprehensive metabolic panel   Result Value Ref Range    Sodium 144 133 - 144 mmol/L    Potassium 3.8 3.4 - 5.3 mmol/L    Chloride 105 94 - 109 mmol/L    Carbon Dioxide 28 20 - 32 mmol/L    Anion Gap 10 3 - 14 mmol/L    Glucose 93 70 - 99 mg/dL    Urea Nitrogen 18 7 - 30 mg/dL    Creatinine 0.78 0.52 - 1.04 mg/dL    GFR Estimate 73 >60 mL/min/1.7m2    GFR Estimate If Black 89 >60 mL/min/1.7m2    Calcium 8.7 8.5 - 10.1 mg/dL    Bilirubin Total 0.4 0.2 - 1.3 mg/dL    Albumin 2.4 (L) 3.4 - 5.0 g/dL    Protein Total 6.5 (L) 6.8 - 8.8 g/dL    Alkaline Phosphatase 56 40 - 150 U/L    ALT 10 0 - 50 U/L    AST 16 0 - 45 U/L   Lipase   Result Value Ref Range    Lipase 84 73 - 393 U/L   Lactic acid whole blood   Result Value Ref Range    Lactic Acid 0.9 0.7 - 2.0 mmol/L   Troponin I   Result Value Ref Range    Troponin I ES <0.015 0.000 - 0.045 ug/L   CK total   Result Value Ref Range    CK Total 223 30 - 225 U/L   Influenza A/B antigen swab   Result Value Ref Range    Influenza A/B Agn Specimen Nasopharyngeal     Influenza A Negative NEG^Negative    Influenza B Negative NEG^Negative   XR Chest 2 Views    Narrative    EXAM: XR CHEST 2 VW  1/27/2018 8:00 AM     HISTORY:  Fall, weakness, history of pneumonia       COMPARISON:  Chest radiograph 1/14/2018    FINDINGS:  PA and lateral views of the chest are obtained.    The cardiomediastinal silhouette is unremarkable. Trace bilateral  pleural  effusions. No pneumothorax. When compared to radiograph  1/14/2018 there are persistent, but improved right midlung and lower  lung opacities. Increased left lower lung linear opacities.    Degenerative changes of the glenohumeral joints bilaterally.  Degenerative changes of the thoracic spine. The visualized upper  abdomen is unremarkable. No acute osseous abnormalities.      Impression    IMPRESSION: Compared to radiograph 1/14/2018, right mid lung and upper  lung opacities persist, but appear improved. New linear left basilar  opacities are favored to represent atelectasis versus infection.    I have personally reviewed the examination and initial interpretation  and I agree with the findings.    RETA HARDY   CT Head w/o Contrast    Narrative    CT HEAD W/O CONTRAST 1/27/2018 8:27 AM    Provided History: fall, hit back of head    Comparison: None available.    Technique: Using multidetector thin collimation helical acquisition  technique, axial, coronal and sagittal CT images from the skull base  to the vertex were obtained without intravenous contrast.     Findings:    No intracranial hemorrhage, mass effect, or midline shift. The  ventricles are proportionate to the cerebral sulci.  The gray to white  matter differentiation of the cerebral hemispheres is preserved. The  basal cisterns are patent.    The visualized paranasal sinuses are clear. The mastoid air cells are  clear. No external soft tissue swelling. No evidence of calvarial  fracture. Hyperostosis frontalis interna. The orbits are unremarkable.       Impression    Impression: No acute intracranial pathology.    I have personally reviewed the examination and initial interpretation  and I agree with the findings.    MAKENZIE MIRAMONTES MD        Labs Ordered and Resulted from Time of ED Arrival Up to the Time of Departure from the ED   CBC WITH PLATELETS DIFFERENTIAL - Abnormal; Notable for the following:        Result Value    RBC Count 3.66 (*)      Hemoglobin 10.9 (*)     MCHC 29.9 (*)     All other components within normal limits   COMPREHENSIVE METABOLIC PANEL - Abnormal; Notable for the following:     Albumin 2.4 (*)     Protein Total 6.5 (*)     All other components within normal limits   INR   LIPASE   LACTIC ACID WHOLE BLOOD   TROPONIN I   CK TOTAL   UA MACROSCOPIC WITH REFLEX TO MICRO AND CULTURE   INFLUENZA A/B ANTIGEN            Assessments & Plan (with Medical Decision Making)       I have reviewed the nursing notes.  Emergency Department course:  The patient was seen and examined at 0658 am, on my arrival to the ED. EKG shows a normal sinus rhythm, rate of 84 bpm, with no acute ischemic changes.  Laboratory studies, including a CBC, lipase, comprehensive metabolic panel, and INR are essentially unremarkable.  She is anemic, with a hemoglobin of 10.9.  Lactate is within normal limits at 0.9.  Troponin I is less than 0.015.  CK is 223 and influenza A is negative.  Head CT shows no acute intracranial pathology.  Chest x-ray shows  Compared to radiograph 1/14/2018, right mid lung and upper lung  opacities persist, but appear improved. New linear left basilar  opacities are favored to represent atelectasis versus infection.  UA was delayed as the patient was unable to urinate.  I treated her with a normal saline bolus IV.    Patient is a 72-year-old female status post fall and weakness and inability to walk for 2-3 days.  Laboratory studies are essentially unremarkable.  Chest x-ray shows persistent opacities but improved from prior studies.  She lives alone and will not be able to manage by herself.  She will be admitted to the medicine service for further evaluation and treatment.  I spoke with Dr. Perdomo of medicine regarding admission.   She was admitted upstairs at approximately 1145 am.     I have reviewed the findings, diagnosis, plan and need for follow up with the patient.    New Prescriptions    No medications on file       Final diagnoses:    Weakness   Fall, initial encounter       1/27/2018   [facility], [place], EMERGENCY DEPARTMENT  This note was created in part by the use of Dragon voice recognition dictation system. Inadvertent grammatical errors and typographical errors may still exist.  MD Rodney Damon Alda L, MD  01/27/18 1154

## 2018-01-27 NOTE — LETTER
Transition Communication Hand-off for Care Transitions to Next Level of Care Provider    Name: Lynda Delgadillo  MRN #: [id number]  Primary Care Provider: Owen Jurado     Primary Clinic: [facility] [address] [place] [address]     Reason for Hospitalization:  Weakness [R53.1]  Fall, initial encounter [W19.XXXA]  Admit Date/Time: 1/27/2018  5:54 AM  Discharge Date: 1/29/18  Payor Source: Payor: MEDICA / Plan: MEDICA [employer] / Product Type: Indemnity /     Readmission Assessment Measure (ARIEL) Risk Score/category: Elevated         Reason for Communication Hand-off Referral: Other D/c to TCU    Discharge Plan:  Social Work Services Discharge Note      Patient Name:  Lynda Delgadillo     Anticipated Discharge Date:  1/29/18    Discharge Disposition:   TCU:  [place] [address]  P. [phone], F. [phone]     Pre-Admission Screening (PAS) online form has been completed.  The Level of Care (LOC) is:  Determined  Confirmation Code is:  [id number]  Patient/caregiver informed of referral to [facility] for Pre-Admission Screening for skilled nursing facility (SNF) placement and to expect a phone call post discharge from SNF.     Additional Services/Equipment Arranged:  W/c transport arranged via [employer] ([phone]) for today at 6:30pm.      Patient / Family response to discharge plan:  Pt is agreeable and reported she will update her friend Crystal and reported that she doesn't have phone numbers for her other family members.      Persons notified of above discharge plan:  Pt, bedside nurse, charge nurse, NST, receiving facility, medical team.   Sw tried to call pt's GAEL Lundy at [phone] but the number was not in service.     Concern for non-adherence with plan of care:   Y/N Unknown  Discharge Needs Assessment:  Needs       Most Recent Value    Equipment Currently Used at Home grab bar, shower  chair          Already enrolled in Tele-monitoring program and name of program:  Unknown  Follow-up specialty is recommended: Unknown    Follow-up plan:  Future Appointments  Date Time Provider Department Center   1/30/2018 6:00 AM Guanakito Carrion OT [facility]   1/30/2018 6:00 AM Rachel King PT [facility]       Any outstanding tests or procedures:    Procedures     Future Labs/Procedures    Oxygen - Nasal cannula     Comments:    Lpm by nasal cannula to keep O2 sats 90% or greater.          Referrals     Future Labs/Procedures    Occupational Therapy Adult Consult     Comments:    Evaluate and treat as clinically indicated.    Reason:   Weakness, frequent falls at home, trouble walking    Physical Therapy Adult Consult     Comments:    Evaluate and treat as clinically indicated.    Reason:  Weakness, frequent falls at home, trouble walking            JULIETH Beckman, MSW  7B   [phone] (pager) [phone]  1/29/2018

## 2018-01-28 LAB
ANION GAP SERPL CALCULATED.3IONS-SCNC: 6 MMOL/L (ref 3–14)
BASOPHILS # BLD AUTO: 0 10E9/L (ref 0–0.2)
BASOPHILS NFR BLD AUTO: 0.1 %
BUN SERPL-MCNC: 19 MG/DL (ref 7–30)
CALCIUM SERPL-MCNC: 8.4 MG/DL (ref 8.5–10.1)
CHLORIDE SERPL-SCNC: 104 MMOL/L (ref 94–109)
CO2 SERPL-SCNC: 30 MMOL/L (ref 20–32)
CREAT SERPL-MCNC: 0.71 MG/DL (ref 0.52–1.04)
CRP SERPL-MCNC: 130 MG/L (ref 0–8)
DIFFERENTIAL METHOD BLD: NORMAL
EOSINOPHIL # BLD AUTO: 0.3 10E9/L (ref 0–0.7)
EOSINOPHIL NFR BLD AUTO: 3.6 %
ERYTHROCYTE [DISTWIDTH] IN BLOOD BY AUTOMATED COUNT: 13.5 % (ref 10–15)
FERRITIN SERPL-MCNC: 246 NG/ML (ref 8–252)
GFR SERPL CREATININE-BSD FRML MDRD: 81 ML/MIN/1.7M2
GLUCOSE SERPL-MCNC: 98 MG/DL (ref 70–99)
HCT VFR BLD AUTO: 33 % (ref 35–47)
HGB BLD-MCNC: 9.8 G/DL (ref 11.7–15.7)
IMM GRANULOCYTES # BLD: 0.1 10E9/L (ref 0–0.4)
IMM GRANULOCYTES NFR BLD: 1.4 %
IRON SATN MFR SERPL: 14 % (ref 15–46)
IRON SERPL-MCNC: 26 UG/DL (ref 35–180)
LDH SERPL L TO P-CCNC: 151 U/L (ref 81–234)
LYMPHOCYTES # BLD AUTO: 1.6 10E9/L (ref 0.8–5.3)
LYMPHOCYTES NFR BLD AUTO: 20.9 %
MCH RBC QN AUTO: 29.5 PG (ref 26.5–33)
MCHC RBC AUTO-ENTMCNC: 29.7 G/DL (ref 31.5–36.5)
MCV RBC AUTO: 99 FL (ref 78–100)
MONOCYTES # BLD AUTO: 1 10E9/L (ref 0–1.3)
MONOCYTES NFR BLD AUTO: 13 %
NEUTROPHILS # BLD AUTO: 4.8 10E9/L (ref 1.6–8.3)
NEUTROPHILS NFR BLD AUTO: 61 %
PLATELET # BLD AUTO: 290 10E9/L (ref 150–450)
POTASSIUM SERPL-SCNC: 3.7 MMOL/L (ref 3.4–5.3)
RBC # BLD AUTO: 3.32 10E12/L (ref 3.8–5.2)
RETICS # AUTO: 109.9 10E9/L (ref 25–95)
RETICS/RBC NFR AUTO: 3.4 % (ref 0.5–2)
SODIUM SERPL-SCNC: 140 MMOL/L (ref 133–144)
TIBC SERPL-MCNC: 187 UG/DL (ref 240–430)
VIT B12 SERPL-MCNC: 280 PG/ML (ref 193–986)
WBC # BLD AUTO: 7.8 10E9/L (ref 4–11)

## 2018-01-28 PROCEDURE — 82728 ASSAY OF FERRITIN: CPT | Performed by: STUDENT IN AN ORGANIZED HEALTH CARE EDUCATION/TRAINING PROGRAM

## 2018-01-28 PROCEDURE — 83615 LACTATE (LD) (LDH) ENZYME: CPT | Performed by: STUDENT IN AN ORGANIZED HEALTH CARE EDUCATION/TRAINING PROGRAM

## 2018-01-28 PROCEDURE — 80048 BASIC METABOLIC PNL TOTAL CA: CPT | Performed by: STUDENT IN AN ORGANIZED HEALTH CARE EDUCATION/TRAINING PROGRAM

## 2018-01-28 PROCEDURE — 83010 ASSAY OF HAPTOGLOBIN QUANT: CPT | Performed by: STUDENT IN AN ORGANIZED HEALTH CARE EDUCATION/TRAINING PROGRAM

## 2018-01-28 PROCEDURE — 12000008 ZZH R&B INTERMEDIATE UMMC

## 2018-01-28 PROCEDURE — 83540 ASSAY OF IRON: CPT | Performed by: STUDENT IN AN ORGANIZED HEALTH CARE EDUCATION/TRAINING PROGRAM

## 2018-01-28 PROCEDURE — 85004 AUTOMATED DIFF WBC COUNT: CPT | Performed by: STUDENT IN AN ORGANIZED HEALTH CARE EDUCATION/TRAINING PROGRAM

## 2018-01-28 PROCEDURE — 84134 ASSAY OF PREALBUMIN: CPT | Performed by: STUDENT IN AN ORGANIZED HEALTH CARE EDUCATION/TRAINING PROGRAM

## 2018-01-28 PROCEDURE — 83550 IRON BINDING TEST: CPT | Performed by: STUDENT IN AN ORGANIZED HEALTH CARE EDUCATION/TRAINING PROGRAM

## 2018-01-28 PROCEDURE — 82607 VITAMIN B-12: CPT | Performed by: STUDENT IN AN ORGANIZED HEALTH CARE EDUCATION/TRAINING PROGRAM

## 2018-01-28 PROCEDURE — 86140 C-REACTIVE PROTEIN: CPT | Performed by: STUDENT IN AN ORGANIZED HEALTH CARE EDUCATION/TRAINING PROGRAM

## 2018-01-28 PROCEDURE — 99233 SBSQ HOSP IP/OBS HIGH 50: CPT | Mod: GC | Performed by: INTERNAL MEDICINE

## 2018-01-28 PROCEDURE — 85045 AUTOMATED RETICULOCYTE COUNT: CPT | Performed by: STUDENT IN AN ORGANIZED HEALTH CARE EDUCATION/TRAINING PROGRAM

## 2018-01-28 PROCEDURE — 25000128 H RX IP 250 OP 636: Performed by: INTERNAL MEDICINE

## 2018-01-28 PROCEDURE — 25000132 ZZH RX MED GY IP 250 OP 250 PS 637: Performed by: STUDENT IN AN ORGANIZED HEALTH CARE EDUCATION/TRAINING PROGRAM

## 2018-01-28 PROCEDURE — 40000611 ZZHCL STATISTIC MORPHOLOGY W/INTERP HEMEPATH TC 85060: Performed by: STUDENT IN AN ORGANIZED HEALTH CARE EDUCATION/TRAINING PROGRAM

## 2018-01-28 PROCEDURE — 36415 COLL VENOUS BLD VENIPUNCTURE: CPT | Performed by: STUDENT IN AN ORGANIZED HEALTH CARE EDUCATION/TRAINING PROGRAM

## 2018-01-28 PROCEDURE — 85027 COMPLETE CBC AUTOMATED: CPT | Performed by: STUDENT IN AN ORGANIZED HEALTH CARE EDUCATION/TRAINING PROGRAM

## 2018-01-28 PROCEDURE — 25000132 ZZH RX MED GY IP 250 OP 250 PS 637: Performed by: INTERNAL MEDICINE

## 2018-01-28 RX ORDER — HYDROXYZINE HYDROCHLORIDE 10 MG/1
10 TABLET, FILM COATED ORAL ONCE
Status: DISCONTINUED | OUTPATIENT
Start: 2018-01-28 | End: 2018-01-29 | Stop reason: HOSPADM

## 2018-01-28 RX ADMIN — BUPROPION HYDROCHLORIDE 300 MG: 300 TABLET, FILM COATED, EXTENDED RELEASE ORAL at 08:49

## 2018-01-28 RX ADMIN — LORAZEPAM 0.5 MG: 0.5 TABLET ORAL at 20:57

## 2018-01-28 RX ADMIN — BUPROPION HYDROCHLORIDE 150 MG: 150 TABLET, FILM COATED, EXTENDED RELEASE ORAL at 08:50

## 2018-01-28 RX ADMIN — DULOXETINE HYDROCHLORIDE 60 MG: 30 CAPSULE, DELAYED RELEASE ORAL at 08:49

## 2018-01-28 RX ADMIN — TOLTERODINE TARTRATE 2 MG: 2 CAPSULE, EXTENDED RELEASE ORAL at 08:49

## 2018-01-28 RX ADMIN — DIVALPROEX SODIUM 1500 MG: 500 TABLET, EXTENDED RELEASE ORAL at 20:57

## 2018-01-28 RX ADMIN — ARIPIPRAZOLE 20 MG: 20 TABLET ORAL at 08:49

## 2018-01-28 RX ADMIN — ENOXAPARIN SODIUM 40 MG: 40 INJECTION SUBCUTANEOUS at 19:09

## 2018-01-28 NOTE — PLAN OF CARE
"Problem: Patient Care Overview  Goal: Plan of Care/Patient Progress Review  /58 (BP Location: Left arm)  Temp 98.2  F (36.8  C) (Oral)  Resp 16  Ht 1.727 m (5' 8\")  Wt 116.8 kg (257 lb 8 oz)  SpO2 96%  BMI 39.15 kg/m2 on 2L N/C..Pt denies any pain ,dizziness or headache.Up to bathroom with assist of 1 and has unsteady gait.Lungs clear and diminished in bases.A&O x3.Will continue to monitor.       "

## 2018-01-28 NOTE — PROGRESS NOTES
Social Work Services Progress Note    Hospital Day: 2  Date of Initial Social Work Evaluation:  1.27.18  Consulted with:  Chart    Data:  Patient requiring TCU after hospital discharge.     Intervention:  Facilities unable to assess until PT/OT consults are completed. Will have weekday SW fax these once they are completed.    Preferred facilities who have already had the referral sent to them:    1) [facility] Phone: [phone]     2) [facility] Phone: [phone]    Assessment:  Patient requiring TCU but needs PT/OT assessments completed first.    Plan:    Anticipated Disposition:  Facility:  TBD    Barriers to d/c plan:  PT/OT consults and bed availability    Follow Up:  Will have weekday SW fax P/OT consults after completed.    Angie MARCUS LICSW  1/28/2018    ON CALL PAGER   0800 - 1600   [phone]    ON CALL COVERAGE AFTER 1600  [phone]

## 2018-01-28 NOTE — PROGRESS NOTES
[facility], [place]    Internal Medicine Progress Note - [facility] Service    Main Plans for Today   PT/OT      Assessment & Plan   Lynda Delgadillo is a 72 year old woman w/ PMx of chronic lymphedema, urinary incontinence, bipolar disorder, schizophrenia, and anxiety who presents for weakness, falls, and inability to thrive in home environment. She previously refused TCU after recent admission for pneumonia, but now believes she needs to be a nursing home.     #Weakness  #Gait abnormality  #Falls at home  #Safety concerns at home  Pt has increased difficulty walking in home environment. Frequent falls. CT head shows no abnormalities. Electrolytes, CK, LFTs normal. Mild amenia. No sign of infection, procal normal. Will likely need nursing home/TCU placement.   - PT/OT consult  - SW consult for placement   - fall precautions   - prealbumin      #Normocytic anemia  Borderline macrocytic. Baseline appears to be around 14. Recently had acute illness. Given poor PO intake, may be related to nutritional deficiency.    - peripheral smear  - retic count  - iron studies   - B12     #Urinary retention  - continue tolteridine 2mg daily (started last admission)     #Bipolar disorder  #Schizophrenia  #Anxiety  - continue aripiprazole 20mg daily  - continue bupropion 450mg XL daily  - continue divalproex 1500mg qhs  - continue duloxetine EC 60mg daily     #Lymphedema  #Venous stasis  Chronic.    # elevated CRP  Noted on last admission at 122 for which she was treated for PCP pneumonia. CRP of 130 this admission. Pro-maggi <.05, . No leukocytosis, AF; unlikely infectious in origin. Unlikely rheumatologic. Potential malignant factor.   -acute on chronic; no further workup while inpatient  -Refer to PCP for appropriate cancer screening for her age.     Diet: Combination Diet Regular Diet Adult  Fluids: Bolus as needed; ad zak PO fluids for now  DVT Prophylaxis: Enoxaparin (Lovenox) SQ  Code Status: DNR  / DNI    Disposition Plan   Expected discharge: 2 - 3 days, recommended to transitional care unit once safe disposition plan/ TCU bed available.     Entered: Simeon PAREDESKelsey Machado 01/28/2018, 7:34 AM   Information in the above section will display in the discharge planner report.      The patient's care was discussed with the Attending Physician, Dr. Jimenez.    Simeon PAREDESKelsey Machado  [facility]: 3  Pager: [phone]  Please see sticky note for cross cover information    Interval History   Did well ON, VSS. Ambulating with assistance. States she is feeling much better today.   4 point ROS conducted, with pertinent positives/negatives discussed above.  Physical Exam   Vital Signs: Temp: 98.2  F (36.8  C) Temp src: Oral BP: 122/58   Heart Rate: 82 Resp: 16 SpO2: 96 % O2 Device: Nasal cannula Oxygen Delivery: 2 LPM  Weight: 257 lbs 8 oz  General Appearance: Alert, sitting in chair eating breakfast, pleasant  Respiratory: symmetric breath sounds to bases; no wheezes, crackles, or rhonchi  Cardiovascular: +systolic murmur most prominent at R and L sternal borders; normal S1/S2 with regular rate and rhythm  GI: soft, non-distended; no tenderness to palpation  Skin: no ecchymoses, rashes, or skin changes on arms, legs, face, or neck; warm and dry; hyperpigmentation of venous stasis in bilateral ankles       Data   Labs/Imaging/Vitals/Meds: Reviewed in Epic

## 2018-01-29 ENCOUNTER — APPOINTMENT (OUTPATIENT)
Dept: PHYSICAL THERAPY | Facility: CLINIC | Age: 73
DRG: 556 | End: 2018-01-29
Attending: INTERNAL MEDICINE
Payer: COMMERCIAL

## 2018-01-29 VITALS
DIASTOLIC BLOOD PRESSURE: 72 MMHG | BODY MASS INDEX: 39.16 KG/M2 | RESPIRATION RATE: 18 BRPM | HEIGHT: 68 IN | TEMPERATURE: 98.3 F | WEIGHT: 258.4 LBS | OXYGEN SATURATION: 93 % | SYSTOLIC BLOOD PRESSURE: 116 MMHG

## 2018-01-29 LAB
ANION GAP SERPL CALCULATED.3IONS-SCNC: 8 MMOL/L (ref 3–14)
BUN SERPL-MCNC: 14 MG/DL (ref 7–30)
CALCIUM SERPL-MCNC: 8.6 MG/DL (ref 8.5–10.1)
CHLORIDE SERPL-SCNC: 104 MMOL/L (ref 94–109)
CO2 SERPL-SCNC: 29 MMOL/L (ref 20–32)
COPATH REPORT: NORMAL
CREAT SERPL-MCNC: 0.74 MG/DL (ref 0.52–1.04)
ERYTHROCYTE [DISTWIDTH] IN BLOOD BY AUTOMATED COUNT: 13.4 % (ref 10–15)
GFR SERPL CREATININE-BSD FRML MDRD: 77 ML/MIN/1.7M2
GLUCOSE SERPL-MCNC: 101 MG/DL (ref 70–99)
HAPTOGLOB SERPL-MCNC: 321 MG/DL (ref 35–175)
HCT VFR BLD AUTO: 34.8 % (ref 35–47)
HGB BLD-MCNC: 10.5 G/DL (ref 11.7–15.7)
MCH RBC QN AUTO: 29.5 PG (ref 26.5–33)
MCHC RBC AUTO-ENTMCNC: 30.2 G/DL (ref 31.5–36.5)
MCV RBC AUTO: 98 FL (ref 78–100)
PLATELET # BLD AUTO: 297 10E9/L (ref 150–450)
POTASSIUM SERPL-SCNC: 3.9 MMOL/L (ref 3.4–5.3)
PREALB SERPL IA-MCNC: 11 MG/DL (ref 15–45)
RBC # BLD AUTO: 3.56 10E12/L (ref 3.8–5.2)
SODIUM SERPL-SCNC: 142 MMOL/L (ref 133–144)
WBC # BLD AUTO: 8.8 10E9/L (ref 4–11)

## 2018-01-29 PROCEDURE — 80048 BASIC METABOLIC PNL TOTAL CA: CPT | Performed by: STUDENT IN AN ORGANIZED HEALTH CARE EDUCATION/TRAINING PROGRAM

## 2018-01-29 PROCEDURE — 36415 COLL VENOUS BLD VENIPUNCTURE: CPT | Performed by: STUDENT IN AN ORGANIZED HEALTH CARE EDUCATION/TRAINING PROGRAM

## 2018-01-29 PROCEDURE — 25000132 ZZH RX MED GY IP 250 OP 250 PS 637: Performed by: INTERNAL MEDICINE

## 2018-01-29 PROCEDURE — 25000132 ZZH RX MED GY IP 250 OP 250 PS 637: Performed by: STUDENT IN AN ORGANIZED HEALTH CARE EDUCATION/TRAINING PROGRAM

## 2018-01-29 PROCEDURE — 99238 HOSP IP/OBS DSCHRG MGMT 30/<: CPT | Mod: GC | Performed by: INTERNAL MEDICINE

## 2018-01-29 PROCEDURE — 85027 COMPLETE CBC AUTOMATED: CPT | Performed by: STUDENT IN AN ORGANIZED HEALTH CARE EDUCATION/TRAINING PROGRAM

## 2018-01-29 PROCEDURE — 25000128 H RX IP 250 OP 636: Performed by: INTERNAL MEDICINE

## 2018-01-29 RX ADMIN — BUPROPION HYDROCHLORIDE 300 MG: 300 TABLET, FILM COATED, EXTENDED RELEASE ORAL at 09:19

## 2018-01-29 RX ADMIN — DULOXETINE HYDROCHLORIDE 60 MG: 30 CAPSULE, DELAYED RELEASE ORAL at 09:19

## 2018-01-29 RX ADMIN — TOLTERODINE TARTRATE 2 MG: 2 CAPSULE, EXTENDED RELEASE ORAL at 09:19

## 2018-01-29 RX ADMIN — BUPROPION HYDROCHLORIDE 150 MG: 150 TABLET, FILM COATED, EXTENDED RELEASE ORAL at 09:18

## 2018-01-29 RX ADMIN — ENOXAPARIN SODIUM 40 MG: 40 INJECTION SUBCUTANEOUS at 14:56

## 2018-01-29 RX ADMIN — ARIPIPRAZOLE 20 MG: 20 TABLET ORAL at 09:19

## 2018-01-29 NOTE — PLAN OF CARE
Problem: Patient Care Overview  Goal: Discharge Needs Assessment  Outcome: Adequate for Discharge Date Met: 01/29/18  Pt A&O times 4. Verbalized frustration because of her room mate. Pt waiting for transport to take her to TCU this evening/ MD okay with no oxygen with transport to TCU & informed TCU to access if needing oxygen overnight. Waiting to eat her dinner. Dressed & ready to discharge.

## 2018-01-29 NOTE — PROVIDER NOTIFICATION
"At 2200 pt became upset and was verbally(yelling) abusive to roommate and staff. She stated that her roommate was getting too much attention and that she is a spoiled. She also c/o wanting more Ativan. Patient claimed that she took 2.5mg of Ativan at home, writer told the pt that 0.5 mg is what MD ordered for her and that is what she took yesterday. Writer told the pt that 2.5 mg maybe too much and because of multiple falls.  Pt continues to be upset and demanded to get more ativan. MD was informed and Atarax was ordered and offered and pt decline, \"I am not taking any new medication\". Writer was not successful in convincing the pt to take the Atarax. She is quiet for now. Will continue to monitor.   "

## 2018-01-29 NOTE — PROGRESS NOTES
Social Work Services Progress Note    Hospital Day: 3  Date of Initial Social Work Evaluation:  1/27/18- please see for details  Collaborated with:  Chart review, team rounds, RNCC Eva, nursing [facility]    Data:  Pt is being followed for TCU placement after admitting from home after a fall due to weakness.   Received vm from Terry in admissions at [facility] (p. [phone], f. [phone]) reporting that pt can be accepted Tuesday.     Intervention:  Regina contacted [facility] (p. [phone], f. [phone])- spoke with Abdifatah and he reported that at this time pt doesn't have skilled need, but sw explained PT/OT need to see pt still and asked that pt be reconsidered once therapy notes are in and Abdifatah is agreeable to this.     Regina met with pt in pt's room as sw was notified pt wanted to discuss getting to [facility]. Pt presented with anger/frustration around her session with PT/OT today saying that the staff wanted her to wear oxygen and used a gait belt, which she reported no one else has used during her stay. PT also reported being upset about therapy staff wanting to do a cognitive screen and to this pt reported she is alert and cognitive and not going to do a test for anybody. Pt discussed having had an isolated mood lately and that she hasn't done her laundry because she worries that people are watching her so she doesn't feel she can leave to go to the laundry room. During conversation pt did appear to be looking off to the side at times and would pause periodically.     Regina explained TCU cares and recommendations and concerns about pt falling at home. Pt is agreeable to TCU and expressed understanding of explanation and feels that she needs more reinforcement of this different things she has learned here before going back [facility]. Pt is also agreeable to a d/c to [facility] and regina explained that pt has been accepted there. Regina explained to pt that a d/c to [facility] is not something that can  be pursued from the hospital but that pt should talk with her CADI worker about this and that she can also work with the TCU sw on this.     Regina called [facility] for admissions inquiring about ability to admit pt today, waiting to hear back. Natasha called back reporting pt can be accepted today at 6:30pm.     Assessment:  See bedside RN, PT/OT, medical team notes    Plan:    Anticipated Disposition:  Facility:  TCU, location to be determined    Barriers to d/c plan:  Bed availability/acceptance, awaiting PT/OT recs    Follow Up:  regina LESTER will continue to follow    JULIETH Beckman, MSW  7B   [phone] (pager) [phone]  1/29/2018

## 2018-01-29 NOTE — PROGRESS NOTES
"CLINICAL NUTRITION SERVICES - ASSESSMENT NOTE     Nutrition Prescription    RECOMMENDATIONS FOR MDs/PROVIDERS TO ORDER:  None at this time     Malnutrition Status:    Unable to determine due to pt busy with cares during attempts to visit    Recommendations already ordered by Registered Dietitian (RD):  None at this time     Future/Additional Recommendations:  1. Encourage patient to consume at least 75% of meals TID or the equivalent with snacks/supplements.  If consuming less than this offer supplements and/or scheduled snacks.  2. If patient discharges home, patient likely would benefit from grocery or meal delivery service.     REASON FOR ASSESSMENT  Lynda Delgadillo is a/an 72 year old female assessed by the dietitian for Admission Nutrition Risk Screen for reduced oral intake over the last month    NUTRITION HISTORY  Per MD note on 1/27, pt not eating well PTA 2/2 not having food in house but said appetite okay. Pt admit with weakness and falls at home.  Recently admitted with pneumonia. Per chart, looking into TCU placement after discharge.    Per RD note on 1/14/18, pt reported improving appetite but not much desire to eat.  Recently got dentures and reported having difficulty eating with them.  Pt reported overall her diet was poor 2/2 living alone and not getting groceries often (tried [employer] grocery service but did not like it).    CURRENT NUTRITION ORDERS  Diet: Regular  Intake/Tolerance: fair-good appetite noted    LABS  Labs reviewed    MEDICATIONS  Medications reviewed    ANTHROPOMETRICS  Height: 172.7 cm (5' 8\")  Most Recent Weight: 117.2 kg (258 lb 6.4 oz)    IBW: 63.6 kg   BMI: Obesity Grade II BMI 35-39.9  Weight History: Weight is down 10# over the past 4 months (3.7% wt loss), but unable to verify wt trends with patient today  Wt Readings from Last 10 Encounters:   01/28/18 117.2 kg (258 lb 6.4 oz)   01/17/18 118.5 kg (261 lb 3.2 oz)   09/27/17 121.6 kg (268 lb 3 oz)   02/13/17 122.4 kg " (269 lb 14.4 oz)   12/25/16 120.7 kg (266 lb)   08/25/16 123.2 kg (271 lb 11.2 oz)   08/10/14 119.3 kg (263 lb)   05/12/14 122 kg (269 lb)   06/09/12 115.8 kg (255 lb 4.7 oz)      Dosing Weight: 77 kg (adjusted based on admit/lowest recent wt 116.8 kg and IBW)    ASSESSED NUTRITION NEEDS  Estimated Energy Needs: 0777-8001 kcals/day (20 - 25 kcals/kg)  Justification: Obese  Estimated Protein Needs: 77-92 grams protein/day (1 - 1.2 grams of pro/kg)  Justification: Maintenance of lean body mass  Estimated Fluid Needs: (1 mL/kcal)   Justification: Maintenance, or per provider pending fluid status    PHYSICAL FINDINGS  See malnutrition section below.    MALNUTRITION  % Intake: Unable to assess  % Weight Loss: Unable to assess  Subcutaneous Fat Loss: Unable to assess  Muscle Loss: Unable to assess  Fluid Accumulation/Edema: chronic lymphedema per chart review  Malnutrition Diagnosis: Unable to determine due to pt busy with cares during attempts to visit    NUTRITION DIAGNOSIS  Predicted inadequate nutrient intake (protein-energy) related to good/fair appetite noted since admission but reports of poor PO intake at home 2/2 decreased access/ability to get groceries consistently    INTERVENTIONS  Implementation  Nutrition Education: Unable to complete due to pt busy with cares during attempts to visit     Goals  Patient to consume % of nutritionally adequate meal trays TID, or the equivalent with supplements/snacks.     Monitoring/Evaluation  Progress toward goals will be monitored and evaluated per protocol.     Berenice Garcia, CAROLYN, LD   7B RD Pager: [phone]

## 2018-01-29 NOTE — PLAN OF CARE
Problem: Patient Care Overview  Goal: Plan of Care/Patient Progress Review  Outcome: Improving  Pt alert and pleasant today. Lungs clear. Ate well at two meals. O2 on at beginning of shift and O2 sats 93%. Drs removed O2 and sats 991-92% at rest and 84-88  % with activity. Up walking in garcia with standby assist, gait belt and walker. See PT/OT note for reference to pt refusal to wear O2 when walking. Informed of plan for discharge and Medicine team contacted by text message to inquire if they wanted to order O2 for transport or for TCU.

## 2018-01-29 NOTE — DISCHARGE SUMMARY
[facility], [place]    Internal Medicine Discharge Summary- Luis Service    Date of Admission:  1/27/2018  Date of Discharge:  01/29/18  Discharging Attending Provider: Valentino Jimenez MD  Discharge Team: Luis 3    Discharge Diagnoses   Weakness  Normocytic anemia    Follow-ups Needed After Discharge   Facility provider or PCP for further workup on elevated CRP and age appropriate cancer screenings    Hospital Course   Lynda Delgadillo is a 72 year old woman w/ PMx of chronic lymphedema, urinary incontinence, bipolar disorder, schizophrenia, and anxiety who presents for weakness, falls, and inability to thrive in home environment. She previously refused TCU after recent admission for pneumonia, but now believes she needs TCU or nursing home. The following problems were addressed during her hospitalization:    #Weakness  #Gait abnormality  #Falls at home  #Safety concerns at home  Pt has increased difficulty walking in home environment. Frequent falls. CT head shows no abnormalities. Electrolytes, CK, LFTs normal. Mild amenia. No sign of infection, pro-maggi normal. Patient ambulating with assistance.   - PT believes TCU is appropriate  - fall precautions          #Normocytic anemia  Borderline macrocytic. Baseline appears to be around 14. Recently had acute illness. Given poor PO intake, may be related to nutritional deficiency.    - peripheral smear pending  - retic count-absolute 109.9%, %Retic 3.4  - iron studies- iron low at 26  - B12- normal at 280      #Urinary retention  - continue tolteridine 2mg daily (started last admission)      #Bipolar disorder  #Schizophrenia  #Anxiety  - continue aripiprazole 20mg daily  - continue bupropion 450mg XL daily  - continue divalproex 1500mg qhs  - continue duloxetine EC 60mg daily      #Lymphedema  #Venous stasis  Chronic.     # elevated CRP  Noted on last admission at 122 for which she was treated for PCP pneumonia. CRP of 130 this  admission. Pro-maggi <.05, . No leukocytosis, AF; unlikely infectious in origin. Unlikely rheumatologic. Potential malignant factor.   -acute on chronic; no further workup while inpatient  -Refer to PCP for appropriate cancer screening for her age.     Consultations This Hospital Stay   PHYSICAL THERAPY ADULT IP CONSULT  SOCIAL WORK IP CONSULT  OCCUPATIONAL THERAPY ADULT IP CONSULT  MEDICATION HISTORY IP PHARMACY CONSULT  PHYSICAL THERAPY ADULT IP CONSULT  OCCUPATIONAL THERAPY ADULT IP CONSULT     Code Status   DNR / DNI       The patient was discussed with Valentino Whitley MD [facility]  Pager: [phone]  ______________________________________________________________________  Interval History     Did well ON, VSS. One episode of desaturation in upper 80's while asleep, otherwise mid to upper 90's on RA. Ambulating with assistance. Reports her appetite is much better this morning.She discussed with daughter who lives in [place] and more receptive to TCU or nursing home.   Physical Exam   Vital Signs: Temp: 98.3  F (36.8  C) Temp src: Oral BP: 116/72   Heart Rate: 82 Resp: 18 SpO2: 93 % O2 Device: Nasal cannula Oxygen Delivery: 2 LPM  Weight: 258 lbs 6.4 oz    General Appearance:  Alert, sitting in chair eating breakfast  Respiratory: symmetric breath sounds to bases; no wheezes, crackles, or rhonchi  Cardiovascular: +systolic murmur most prominent at R and L sternal borders; normal S1/S2 with regular rate and rhythm  GI: soft, non-distended; no tenderness to palpation  Skin: no ecchymoses, rashes, or skin changes on arms, legs, face, or neck; warm and dry; hyperpigmentation of venous stasis in bilateral ankles       Significant Results and Procedures   Most Recent 3 CBC's:  Recent Labs   Lab Test  01/29/18   1013  01/28/18   0723  01/27/18   1241  01/27/18   0728   WBC  8.8  7.8   --   10.4   HGB  10.5*  9.8*   --   10.9*   MCV  98  99   --   100   PLT  297  290   283  305     Most Recent 3 BMP's:  Recent Labs   Lab Test  01/29/18   1013  01/28/18   0723  01/27/18   1241  01/27/18   0728   NA  142  140   --   144   POTASSIUM  3.9  3.7   --   3.8   CHLORIDE  104  104   --   105   CO2  29  30   --   28   BUN  14  19   --   18   CR  0.74  0.71  0.80  0.78   ANIONGAP  8  6   --   10   HITESH  8.6  8.4*   --   8.7   GLC  101*  98   --   93     Most Recent Urinalysis:  Recent Labs   Lab Test  01/27/18   1345   COLOR  Yellow   APPEARANCE  Slightly Cloudy   URINEGLC  Negative   URINEBILI  Negative   URINEKETONE  10*   SG  1.022   UBLD  Negative   URINEPH  5.5   PROTEIN  10*   NITRITE  Negative   LEUKEST  Trace*   RBCU  1   WBCU  2     Most Recent Anemia Panel:  Recent Labs   Lab Test  01/29/18   1013  01/28/18   0723   11/25/10   0701   WBC  8.8  7.8   < >   --    HGB  10.5*  9.8*   < >   --    HCT  34.8*  33.0*   < >   --    MCV  98  99   < >   --    PLT  297  290   < >   --    IRON   --   26*   < >   --    IRONSAT   --   14*   < >   --    RETICABSCT   --   109.9*   --    --    RETP   --   3.4*   --    --    FEB   --   187*   < >   --    JULEE   --   246   --    --    B12   --   280   --   466   FOLIC   --    --    --   15.7    < > = values in this interval not displayed.     Most Recent CPK:  Recent Labs   Lab Test  01/27/18   0728   CKT  223   ,   Results for orders placed or performed during the hospital encounter of 01/27/18   XR Chest 2 Views    Narrative    EXAM: XR CHEST 2 VW  1/27/2018 8:00 AM     HISTORY:  Fall, weakness, history of pneumonia       COMPARISON:  Chest radiograph 1/14/2018    FINDINGS:  PA and lateral views of the chest are obtained.    The cardiomediastinal silhouette is unremarkable. Trace bilateral  pleural effusions. No pneumothorax. When compared to radiograph  1/14/2018 there are persistent, but improved right midlung and lower  lung opacities. Increased left lower lung linear opacities.    Degenerative changes of the glenohumeral joints  bilaterally.  Degenerative changes of the thoracic spine. The visualized upper  abdomen is unremarkable. No acute osseous abnormalities.      Impression    IMPRESSION: Compared to radiograph 1/14/2018, right mid lung and upper  lung opacities persist, but appear improved. New linear left basilar  opacities are favored to represent atelectasis versus infection.    I have personally reviewed the examination and initial interpretation  and I agree with the findings.    RETA HARDY   CT Head w/o Contrast    Narrative    CT HEAD W/O CONTRAST 1/27/2018 8:27 AM    Provided History: fall, hit back of head    Comparison: None available.    Technique: Using multidetector thin collimation helical acquisition  technique, axial, coronal and sagittal CT images from the skull base  to the vertex were obtained without intravenous contrast.     Findings:    No intracranial hemorrhage, mass effect, or midline shift. The  ventricles are proportionate to the cerebral sulci.  The gray to white  matter differentiation of the cerebral hemispheres is preserved. The  basal cisterns are patent.    The visualized paranasal sinuses are clear. The mastoid air cells are  clear. No external soft tissue swelling. No evidence of calvarial  fracture. Hyperostosis frontalis interna. The orbits are unremarkable.       Impression    Impression: No acute intracranial pathology.    I have personally reviewed the examination and initial interpretation  and I agree with the findings.    MAKENZIE MIRAMONTES MD     Primary Care Physician   Owen Jurado    Discharge Disposition   Discharged to rehabilitation facility    Condition at discharge: Stable    Discharge Orders     General info for SNF   Length of Stay Estimate: Long Term Care  Condition at Discharge: Stable  Level of care:skilled   Rehabilitation Potential: Fair  Admission H&P remains valid and up-to-date: Yes  Recent Chemotherapy: N/A  Use Nursing Home Standing Orders: Yes     Kayla  instructions   Give two-step Mantoux (PPD) Per Facility Policy Yes     Reason for your hospital stay   You were admitted due to a fall and continued weakness since your last hospital admission where you were diagnosed with pneumonia. At this admission the physical therapist worked with you and and suggested rehabilitation at a TCU.     Activity - Up with nursing assistance     DNR/DNI       Discharge Medications   Current Discharge Medication List      CONTINUE these medications which have NOT CHANGED    Details   ARIPIPRAZOLE PO Take 20 mg by mouth daily      tolterodine (DETROL LA) 2 MG 24 hr capsule Take 1 capsule (2 mg) by mouth daily  Qty: 30 capsule, Refills: 0    Associated Diagnoses: Stress incontinence of urine      buPROPion (WELLBUTRIN XL) 150 MG 24 hr tablet Take 150 mg by mouth every morning With one 300 mg tablet for a total dose of 450 mg by mouth daily.      divalproex (DEPAKOTE ER) 500 MG 24 hr tablet Take 1,500 mg by mouth At Bedtime      DULoxetine (CYMBALTA) 60 MG EC capsule Take 60 mg by mouth daily      LORazepam (ATIVAN) 0.5 MG tablet Take 1 mg by mouth At Bedtime           Allergies   Allergies   Allergen Reactions     Dilaudid Cough Other (See Comments)     Psychosis and agitation     Haldol Swelling     Tongue swelling     Lactose GI Disturbance     Morphine      I have a anger reaction.     Neurontin [Gabapentin] Unknown     Patient states she just refuses to take it after reading side effect profile     Penicillins Hives

## 2018-01-29 NOTE — PROGRESS NOTES
[facility], [place]    Internal Medicine Progress Note - [facility]    Main Plans for Today   Dispo pending PT/OT    Oximetry overnight  Assessment & Plan   Lynda Delgadillo is a 72 year old woman w/ PMx of chronic lymphedema, urinary incontinence, bipolar disorder, schizophrenia, and anxiety who presents for weakness, falls, and inability to thrive in home environment. She previously refused TCU after recent admission for pneumonia, but now believes she needs TCU or nursing home.     #Weakness  #Gait abnormality  #Falls at home  #Safety concerns at home  Pt has increased difficulty walking in home environment. Frequent falls. CT head shows no abnormalities. Electrolytes, CK, LFTs normal. Mild amenia. No sign of infection, pro-maggi normal. Will likely need nursing home/TCU placement.  Prealbumin 11  - PT/OT consult  - SW consult for placement   - fall precautions        #Normocytic anemia  Borderline macrocytic. Baseline appears to be around 14. Recently had acute illness. Given poor PO intake, may be related to nutritional deficiency.    - peripheral smear pending  - retic count-absolute 109.9%, %Retic 3.4  - iron studies- iron low at 26  - B12- normal at 280     #Urinary retention  - continue tolteridine 2mg daily (started last admission)     #Bipolar disorder  #Schizophrenia  #Anxiety  - continue aripiprazole 20mg daily  - continue bupropion 450mg XL daily  - continue divalproex 1500mg qhs  - continue duloxetine EC 60mg daily     #Lymphedema  #Venous stasis  Chronic.    # elevated CRP  Noted on last admission at 122 for which she was treated for PCP pneumonia. CRP of 130 this admission. Pro-maggi <.05, . No leukocytosis, AF; unlikely infectious in origin. Unlikely rheumatologic. Potential malignant factor.   -acute on chronic; no further workup while inpatient  -Refer to PCP for appropriate cancer screening for her age.     Diet: Combination Diet Regular Diet Adult  Fluids:  Bolus as needed; ad zak PO fluids for now  DVT Prophylaxis: Enoxaparin (Lovenox) SQ  Code Status: DNR / DNI    Disposition Plan   Expected discharge: 2 - 3 days, recommended to transitional care unit once safe disposition plan/ TCU bed available.     Entered: Simeon Machado 01/29/2018, 10:57 AM   Information in the above section will display in the discharge planner report.      The patient's care was discussed with the Attending Physician, Dr. Jimenez.    Simeon Machado  [facility]: 3  Pager: [phone]  Please see sticky note for cross cover information    Interval History   Did well ON, VSS. Ambulating with assistance. Reports her appetite is much better this morning. Discussed with daughter who lives in [place] and more receptive to TCU or nursing home.      4 point ROS conducted, with pertinent positives/negatives discussed above.  Physical Exam   Vital Signs: Temp: 98.3  F (36.8  C) Temp src: Oral BP: 116/72   Heart Rate: 82 Resp: 18 SpO2: 93 % O2 Device: Nasal cannula Oxygen Delivery: 2 LPM  Weight: 258 lbs 6.4 oz  General Appearance: Alert, sitting in chair eating breakfast  Respiratory: symmetric breath sounds to bases; no wheezes, crackles, or rhonchi  Cardiovascular: +systolic murmur most prominent at R and L sternal borders; normal S1/S2 with regular rate and rhythm  GI: soft, non-distended; no tenderness to palpation  Skin: no ecchymoses, rashes, or skin changes on arms, legs, face, or neck; warm and dry; hyperpigmentation of venous stasis in bilateral ankles       Data   Labs/Imaging/Vitals/Meds: Reviewed in Epic

## 2018-01-29 NOTE — PLAN OF CARE
Problem: Patient Care Overview  Goal: Plan of Care/Patient Progress Review  OT: 7B: Pt OOR when OT attempted, per roommate pt walking w/ NA at the moment, OT will check back tomorrow for OT needs, pt w/ h/o falls therefore PT will screen for OT needs 1/30/18

## 2018-01-29 NOTE — PLAN OF CARE
Problem: Patient Care Overview  Goal: Plan of Care/Patient Progress Review  PT 7B: Cx, with attempt of eval early this AM pt's RN reported pt resting and wishes to not be disturbed. Plan to check back later.

## 2018-01-29 NOTE — PLAN OF CARE
Problem: Patient Care Overview  Goal: Plan of Care/Patient Progress Review  Outcome: No Change  Vital signs:  Temp: 98.5  F (36.9  C) Temp src: Oral BP: 144/82   Heart Rate: 89 Resp: 18 SpO2: 96 % O2 Device: Nasal cannula Oxygen Delivery: 2 LPM   VSS, placed on 2L O2 D/T de-satting while sleeping. Denies pain. Bed alarm on. Assist x 1 to commode at bedside. Voiding adequate amounts. Sleeping between cares.

## 2018-01-29 NOTE — PROGRESS NOTES
[facility]  Patient is currently open to home care services with [facility].  The patient is currently receiving RN/PT services.  [facility]  and team have been notified of patient admission.  [facility] liaison will continue to follow patient during stay.  If appropriate provide orders to resume home care at time of discharge.    Thank you  Anna Brewer RN, BSN  [facility] Liaison  [phone]

## 2018-01-29 NOTE — PROGRESS NOTES
01/29/18 1310   Quick Adds   Type of Visit Initial PT Evaluation   Living Environment   Lives With alone   Living Arrangements apartment   Home Accessibility no concerns   Number of Stairs to Enter Home 0   Number of Stairs Within Home 0   Living Environment Comment walk-in shower, where pt lives there are not many safe intersections to cross the street, pt reports neighborhood is not very safe. Her apt is very cluttered, she brings back things from [employer] nearby regularly. She acknowledges she has many unneeded items and that she could not even walk her hallway with a walker due to the clutter.    Self-Care   Dominant Hand right   Usual Activity Tolerance moderate   Current Activity Tolerance fair  (to moderate)   Regular Exercise no   Equipment Currently Used at Home grab bar;shower chair   Activity/Exercise/Self-Care Comment Pt has ILS worker who provides support, 3 hrs  for cleaning, laundry. Pt reports showering a challenge and she needs more help. She is not able to wash her back well with current set-up. Reports cooking and keeping areas clean is difficult. She does not feel well organized and her environment is overly cluttered to the point she cannot walk through with a walker as she has been previously recommended to do. Report she does not feel she has enough support at home. At times has fallen and been stuck on the floor because she has not had a way up (history B knee replacements, unable to kneel), too weak to assist herself up.    Functional Level Prior   Ambulation 0-->independent   Transferring 0-->independent   Toileting 1-->assistive equipment  (grab bar)   Bathing 1-->assistive equipment  (shower chair, hand-held shower head)   Dressing 0-->independent   Eating 0-->independent   Communication 0-->understands/communicates without difficulty   Swallowing 0-->swallows foods/liquids without difficulty   Cognition 0 - no cognition issues reported   Fall history within last six months  yes   Number of times patient has fallen within last six months 10   Which of the above functional risks had a recent onset or change? ambulation;transferring;bathing;eating   Prior Functional Level Comment Reports decline in functional strength. Also has tried to walk outside with a cane but has not felt safe due to the weather recently.    General Information   Onset of Illness/Injury or Date of Surgery - Date 01/27/18   Referring Physician Valentino Jimenez MD   Patient/Family Goals Statement To learn more about senior care, pt feels this may be better setting for her.    Pertinent History of Current Problem (include personal factors and/or comorbidities that impact the POC) Lynda Delgadillo is a 72 year old woman w/ PMx of chronic lymphedema, urinary incontinence, bipolar disorder, schizophrenia, and anxiety who presents for weakness, falls, and inability to thrive in home environment. She previously refused TCU after recent admission for pneumonia, but now believes she needs TCU or nursing home.    Precautions/Limitations fall precautions   General Info Comments activity: up with assist   Cognitive Status Examination   Level of Consciousness alert   Follows Commands and Answers Questions 100% of the time   Personal Safety and Judgment intact;impaired  (refuses supplemental O2 when sats 84%, but now using walker )   Cognitive Comment reports she needs further emotional support, has had depression and a lot of inactivity at home due to this   Pain Assessment   Patient Currently in Pain No   Posture    Posture Comments slight reduced upright posture   Range of Motion (ROM)   ROM Comment no deficits   Strength   Strength Comments shoulder abd 4-/5 flex 4/5, hip flex 4-/5 otherwise no deficits noted   Bed Mobility   Bed Mobility Comments IND supine<>sit   Transfer Skills   Transfer Comments CGA-SBA sit<>stand, reduced safety noted with transfers   Gait   Gait Comments SBA with walker. Without walker less stable and  "speed increased   Balance   Balance Comments Timed up and Go scores: with walker pt scored 23.6 sec, without walker pt scored 14.6 sec (scores <13.5 sec indicate increased falling risk)   Coordination   Coordination no deficits were identified   General Therapy Interventions   Planned Therapy Interventions bed mobility training;gait training;neuromuscular re-education;ROM;stretching;strengthening;transfer training;progressive activity/exercise;home program guidelines   Clinical Impression   Criteria for Skilled Therapeutic Intervention yes, treatment indicated   PT Diagnosis weakness   Influenced by the following impairments reduced proximal strength, balance, safety, activity tolerance   Functional limitations due to impairments below baseline bed mobility, transfers, gait   Clinical Presentation Evolving/Changing   Clinical Presentation Rationale changing emotional and physical status   Clinical Decision Making (Complexity) Moderate complexity   Therapy Frequency` daily   Predicted Duration of Therapy Intervention (days/wks) 1 week   Anticipated Equipment Needs at Discharge (insert bedrail, 4WW)   Anticipated Discharge Disposition Transitional Care Facility;Home with Home Therapy;Home with Assist  (however home currently does not appear safe per clutter)   Risk & Benefits of therapy have been explained Yes   Patient, Family & other staff in agreement with plan of care Yes   [facility] TM \"6 Clicks\"   2016, Trustees of [facility], under license to [employer].  All rights reserved.   6 Clicks Short Forms Basic Mobility Inpatient Short Form   [facility]  \"6 Clicks\" V.2 Basic Mobility Inpatient Short Form   1. Turning from your back to your side while in a flat bed without using bedrails? 4 - None   2. Moving from lying on your back to sitting on the side of a flat bed without using bedrails? 4 - None   3. Moving to and from a bed to a chair (including a wheelchair)? 3 - A Little "   4. Standing up from a chair using your arms (e.g., wheelchair, or bedside chair)? 3 - A Little   5. To walk in hospital room? 3 - A Little   6. Climbing 3-5 steps with a railing? 3 - A Little   Basic Mobility Raw Score (Score out of 24.Lower scores equate to lower levels of function) 20   Total Evaluation Time   Total Evaluation Time (Minutes) 13

## 2018-01-29 NOTE — PLAN OF CARE
"Problem: Patient Care Overview  Goal: Plan of Care/Patient Progress Review  Outcome: Declining  /82 (BP Location: Right arm)  Temp 98.5  F (36.9  C) (Oral)  Resp 18  Ht 1.727 m (5' 8\")  Wt 117.2 kg (258 lb 6.4 oz)  SpO2 93%  BMI 39.29 kg/m2  1930 - 2330  Neuro: A&Ox4. Pt had one episode of verbal outburst(see previous note)  Cardiac: SR. VSS.   Respiratory: Sating 93% on RA.  GI/: No bowel movement/Adequate urine output.   Diet/appetite: Tolerating regular diet. Eating well.  Activity:  Assist of one to the chair and commode.  Pain: At acceptable level on current regimen.   Skin: Intact, no new deficits noted.  LDA's: PIV saline locked.  Plan: Continue with POC. Notify primary team with changes      "

## 2018-01-29 NOTE — PROGRESS NOTES
Social Work Services Discharge Note      Patient Name:  Lynda Delgadillo     Anticipated Discharge Date:  1/29/18    Discharge Disposition:   TCU:  [facility]   [address] [facility]  P. [phone], F. [phone]     Pre-Admission Screening (PAS) online form has been completed.  The Level of Care (LOC) is:  Determined  Confirmation Code is:  [id number]  Patient/caregiver informed of referral to [facility] for Pre-Admission Screening for skilled nursing facility (SNF) placement and to expect a phone call post discharge from SNF.     Additional Services/Equipment Arranged:  W/c transport arranged via [facility] ([phone]) for today at 6:30pm.      Patient / Family response to discharge plan:  Pt is agreeable and reported she will update her friend Crystal and reported that she doesn't have phone numbers for her other family members.      Persons notified of above discharge plan:  Pt, bedside nurse, charge nurse, NST, receiving facility, medical team.   Sw tried to call pt's GAEL Lundy at [phone] but the number was not in service.    Staff Discharge Instructions:  Please fax discharge orders and signed hard scripts for any controlled substances.  Please print a packet and send with patient.     CTS Handoff completed:  YES    Medicare Notice of Rights provided to the patient/family:  JULIETH Ann, MSW  7B   [phone] (pager) [phone]  1/29/2018

## 2018-01-29 NOTE — PLAN OF CARE
"Problem: Patient Care Overview  Goal: Plan of Care/Patient Progress Review  Discharge Planner PT   Patient plan for discharge:  Agreeable to TCU. Pt also very interested in learning more from SW related to [facility]. Feels she may need to transition to [facility] soon.   Current status: PT 7B: Eval complete, treatment provided. Pt displays some proximal LE weakness as she requires CGA to stand from lower surfaces, otherwise SBA. Pt reports prior challenges at home getting up from bedside due to this but that she feels stronger and near baseline currently. Pt ambulates with use of walker and SBA-supervision without any imbalance, achieved 100 ft x 2, required 1 seated rest break at end of hallway. Falls at pt's home appear to be related to pt's reports of a very cluttered environment, along with her proximal weakness. Pt would benefit from purchase of an insertable railing at bedside to aid in her ability to safely perform sit<>stand. She also would benefit from using a 4WW for hallway walking as sometimes she needs to sit down suddenly and only owns a FWW and 2 canes.     Pt very defensive with discussion as to her cognition as PT asked pt if she has had any recent memory changes in order to screen for OT needs. She reports no memory concerns but that it is a sensitive subject and she felt very upset in instances \"people have questioned my memory\". It appears pt would benefit from cognitive screen but pt adamently refuses this. She does report struggling at home to be safe and care for herself despite her ILS worker and  aid. She feels cooking is too much for her. She would like to go to [facility] where she can go to a dining room to eat meals prepared for her.     Pt also refused to use supplemental O2 when sats went down to 84%. Pt felt this was related to her emotions and getting upset. Did alert pt that her sats were 84% when walking both ways down hallway. Sats recovered to 91-92% on RA with a rest break.     Barriers " to return to prior living situation: reduced strength, safety, increased falling risk per TUG (see eval).   Recommendations for discharge: TCU to improve her safety. Her home environment does not sound safe and pt appears to require further support to improve her safety at home. Pt indicates not enough space present to walk with her walker, that apt is significantly cluttered. As pt reports she is near her baseline functional level if pt further progresses pt may be appropriate for discharge home with home PT/safety eval, but would recommend pt obtains further home support, insertable bedrail upon discharge to improve her safety sit>stand. Pt may also benefit from obtaining a 4WW to allow her to sit as needed when walking. Pt is looking to transition to [facility] for further support additionally.   Rationale for recommendations: reduced safety       Entered by: Rachel King 01/29/2018 2:27 PM

## 2018-01-29 NOTE — PROGRESS NOTES
" 01/29/18 1310   Quick Adds   Type of Visit Initial PT Evaluation   Living Environment   Lives With alone   Living Arrangements apartment   Home Accessibility no concerns   Number of Stairs to Enter Home 0   Number of Stairs Within Home 0   Living Environment Comment walk-in shower, where pt lives there are not many safe intersections to cross the street, pt reports neighborhood is not very safe. Her apt is very cluttered, she brings back things from [employer] nearby regularly. She acknowledges she has many unneeded items and that she could not even walk her hallway with a walker due to the clutter.    Self-Care   Dominant Hand right   Usual Activity Tolerance moderate   Current Activity Tolerance fair  (to moderate)   Regular Exercise no   Equipment Currently Used at Home grab bar;shower chair   Activity/Exercise/Self-Care Comment Pt has ILS worker who provides support, 3 hrs  for cleaning, laundry. Pt reports showering a challenge and she needs more help. She is not able to wash her back well with current set-up. Reports cooking and keeping areas clean is difficult. She does not feel well organized and her environment is overly cluttered to the point she cannot walk through with a walker as she has been previously recommended to do. Report she does not feel she has enough support at home. At times has fallen and been stuck on the floor because she has not had a way up (history B knee replacements, unable to kneel), too weak to assist herself up. Pt very defensive with discussion as to her cognition as PT asked pt if she has had any recent memory changes in order to screen for OT needs. She reports no memory concerns but that it is a sensitive subject and she felt very upset in instances \"people have questioned my memory\".    Functional Level Prior   Ambulation 0-->independent   Transferring 0-->independent   Toileting 0-->independent   Bathing 0-->independent   Dressing 0-->independent   Eating " 0-->independent   Communication 0-->understands/communicates without difficulty   Swallowing 0-->swallows foods/liquids without difficulty   Cognition 0 - no cognition issues reported   Fall history within last six months yes   Number of times patient has fallen within last six months 10   Which of the above functional risks had a recent onset or change? ambulation;transferring;bathing;eating   Prior Functional Level Comment Reports decline in functional strength    General Information   Onset of Illness/Injury or Date of Surgery - Date 01/27/18   Referring Physician Valentino Jimenez MD   Patient/Family Goals Statement To learn more about detention, pt feels this may be better setting for her.    Pertinent History of Current Problem (include personal factors and/or comorbidities that impact the POC) Lynda Delgadillo is a 72 year old woman w/ PMx of chronic lymphedema, urinary incontinence, bipolar disorder, schizophrenia, and anxiety who presents for weakness, falls, and inability to thrive in home environment. She previously refused TCU after recent admission for pneumonia, but now believes she needs TCU or nursing home.    Precautions/Limitations fall precautions   General Info Comments activity: up with assist   Cognitive Status Examination   Level of Consciousness alert   Follows Commands and Answers Questions 100% of the time   Personal Safety and Judgment intact;impaired  (refuses supplemental O2 when sats 84%, but now using walker )   Cognitive Comment reports she needs further emotional support, has had depression and a lot of inactivity at home due to this   Pain Assessment   Patient Currently in Pain No   Posture    Posture Comments slight reduced upright posture   Range of Motion (ROM)   ROM Comment no deficits   Strength   Strength Comments shoulder abd 4-/5 flex 4/5, hip flex 4-/5 otherwise no deficits noted   Bed Mobility   Bed Mobility Comments IND supine<>sit   Transfer Skills   Transfer Comments  "CGA-SBA sit<>stand, reduced safety noted with transfers   Gait   Gait Comments SBA with walker. Without walker less stable and speed increased   Balance   Balance Comments Timed up and Go scores: with walker pt scored 23.6 sec, without walker pt scored 14.6 sec (scores <13.5 sec indicate increased falling risk)   Coordination   Coordination no deficits were identified   General Therapy Interventions   Planned Therapy Interventions bed mobility training;gait training;neuromuscular re-education;ROM;stretching;strengthening;transfer training;progressive activity/exercise;home program guidelines   Clinical Impression   Criteria for Skilled Therapeutic Intervention yes, treatment indicated   PT Diagnosis weakness   Influenced by the following impairments reduced proximal strength, balance, safety, activity tolerance   Functional limitations due to impairments below baseline bed mobility, transfers, gait   Clinical Presentation Evolving/Changing   Clinical Presentation Rationale changing emotional and physical status   Clinical Decision Making (Complexity) Moderate complexity   Therapy Frequency` daily   Predicted Duration of Therapy Intervention (days/wks) 1 week   Anticipated Equipment Needs at Discharge (insert bedrail, 4WW)   Anticipated Discharge Disposition Transitional Care Facility;Home with Home Therapy;Home with Assist  (however home currently does not appear safe per clutter and pt's deficits with safe transfers)   Risk & Benefits of therapy have been explained Yes   Patient, Family & other staff in agreement with plan of care Yes   [facility] Audience-PAC TM \"6 Clicks\"   2016, Trustees of [facility], under license to [employer].  All rights reserved.   6 Clicks Short Forms Basic Mobility Inpatient Short Form   [facility] AM-PAC  \"6 Clicks\" V.2 Basic Mobility Inpatient Short Form   1. Turning from your back to your side while in a flat bed without using bedrails? 4 - None   2. Moving from lying " on your back to sitting on the side of a flat bed without using bedrails? 4 - None   3. Moving to and from a bed to a chair (including a wheelchair)? 3 - A Little   4. Standing up from a chair using your arms (e.g., wheelchair, or bedside chair)? 3 - A Little   5. To walk in hospital room? 3 - A Little   6. Climbing 3-5 steps with a railing? 3 - A Little   Basic Mobility Raw Score (Score out of 24.Lower scores equate to lower levels of function) 20   Total Evaluation Time   Total Evaluation Time (Minutes) 13

## 2018-01-30 ENCOUNTER — CARE COORDINATION (OUTPATIENT)
Dept: CARE COORDINATION | Facility: CLINIC | Age: 73
End: 2018-01-30

## 2018-01-30 NOTE — PLAN OF CARE
Problem: Patient Care Overview  Goal: Plan of Care/Patient Progress Review  OT: cancel, pt DISCH prior to OT eval.

## 2018-01-30 NOTE — PLAN OF CARE
Problem: Patient Care Overview  Goal: Plan of Care/Patient Progress Review  Physical Therapy Discharge Summary    Reason for therapy discharge:    Discharged to transitional care facility.    Progress towards therapy goal(s). See goals on Care Plan in [facility] electronic health record for goal details.  Goals partially met.  Barriers to achieving goals:   discharge from facility.    Therapy recommendation(s):    Continued therapy is recommended.  Rationale/Recommendations:  progress IND with functional mobility.

## 2018-01-31 ENCOUNTER — RECORDS - HEALTHEAST (OUTPATIENT)
Dept: LAB | Facility: CLINIC | Age: 73
End: 2018-01-31

## 2018-02-05 LAB
QTF INTERPRETATION: ABNORMAL
QTF MITOGEN - NIL: 0.15 IU/ML
QTF NIL: 0.04 IU/ML
QTF RESULT: ABNORMAL
QTF TB ANTIGEN - NIL: 0.02 IU/ML

## 2018-02-13 ENCOUNTER — RECORDS - HEALTHEAST (OUTPATIENT)
Dept: LAB | Facility: CLINIC | Age: 73
End: 2018-02-13

## 2018-02-14 LAB
ANION GAP SERPL CALCULATED.3IONS-SCNC: 9 MMOL/L (ref 5–18)
BUN SERPL-MCNC: 17 MG/DL (ref 8–28)
CALCIUM SERPL-MCNC: 9 MG/DL (ref 8.5–10.5)
CHLORIDE BLD-SCNC: 101 MMOL/L (ref 98–107)
CO2 SERPL-SCNC: 30 MMOL/L (ref 22–31)
CREAT SERPL-MCNC: 0.78 MG/DL (ref 0.6–1.1)
ERYTHROCYTE [DISTWIDTH] IN BLOOD BY AUTOMATED COUNT: 13.8 % (ref 11–14.5)
GFR SERPL CREATININE-BSD FRML MDRD: >60 ML/MIN/1.73M2
GLUCOSE BLD-MCNC: 95 MG/DL (ref 70–125)
HCT VFR BLD AUTO: 34.9 % (ref 35–47)
HGB BLD-MCNC: 10.3 G/DL (ref 12–16)
MCH RBC QN AUTO: 29.5 PG (ref 27–34)
MCHC RBC AUTO-ENTMCNC: 29.5 G/DL (ref 32–36)
MCV RBC AUTO: 100 FL (ref 80–100)
PLATELET # BLD AUTO: 335 THOU/UL (ref 140–440)
PMV BLD AUTO: 9.3 FL (ref 8.5–12.5)
POTASSIUM BLD-SCNC: 4.5 MMOL/L (ref 3.5–5)
RBC # BLD AUTO: 3.49 MILL/UL (ref 3.8–5.4)
SODIUM SERPL-SCNC: 140 MMOL/L (ref 136–145)
WBC: 9.2 THOU/UL (ref 4–11)

## 2018-02-20 ENCOUNTER — RECORDS - HEALTHEAST (OUTPATIENT)
Dept: LAB | Facility: CLINIC | Age: 73
End: 2018-02-20

## 2018-02-21 LAB — FERRITIN SERPL-MCNC: 378 NG/ML (ref 10–130)

## 2018-02-28 ENCOUNTER — RECORDS - HEALTHEAST (OUTPATIENT)
Dept: LAB | Facility: CLINIC | Age: 73
End: 2018-02-28

## 2018-03-02 LAB
QTF INTERPRETATION: NORMAL
QTF MITOGEN - NIL: 0.93 IU/ML
QTF NIL: 0.04 IU/ML
QTF RESULT: NEGATIVE
QTF TB ANTIGEN - NIL: 0 IU/ML

## 2018-03-07 ENCOUNTER — RECORDS - HEALTHEAST (OUTPATIENT)
Dept: LAB | Facility: CLINIC | Age: 73
End: 2018-03-07

## 2018-03-08 LAB
ANION GAP SERPL CALCULATED.3IONS-SCNC: 6 MMOL/L (ref 5–18)
BUN SERPL-MCNC: 15 MG/DL (ref 8–28)
CALCIUM SERPL-MCNC: 8.8 MG/DL (ref 8.5–10.5)
CHLORIDE BLD-SCNC: 104 MMOL/L (ref 98–107)
CO2 SERPL-SCNC: 30 MMOL/L (ref 22–31)
CREAT SERPL-MCNC: 0.73 MG/DL (ref 0.6–1.1)
ERYTHROCYTE [DISTWIDTH] IN BLOOD BY AUTOMATED COUNT: 14.9 % (ref 11–14.5)
GFR SERPL CREATININE-BSD FRML MDRD: >60 ML/MIN/1.73M2
GLUCOSE BLD-MCNC: 81 MG/DL (ref 70–125)
HCT VFR BLD AUTO: 32.3 % (ref 35–47)
HGB BLD-MCNC: 9.4 G/DL (ref 12–16)
MCH RBC QN AUTO: 28.5 PG (ref 27–34)
MCHC RBC AUTO-ENTMCNC: 29.1 G/DL (ref 32–36)
MCV RBC AUTO: 98 FL (ref 80–100)
PLATELET # BLD AUTO: 313 THOU/UL (ref 140–440)
PMV BLD AUTO: 9 FL (ref 8.5–12.5)
POTASSIUM BLD-SCNC: 4.5 MMOL/L (ref 3.5–5)
RBC # BLD AUTO: 3.3 MILL/UL (ref 3.8–5.4)
SODIUM SERPL-SCNC: 140 MMOL/L (ref 136–145)
WBC: 7.9 THOU/UL (ref 4–11)

## 2018-03-26 ENCOUNTER — RECORDS - HEALTHEAST (OUTPATIENT)
Dept: LAB | Facility: CLINIC | Age: 73
End: 2018-03-26

## 2018-03-27 LAB
BASOPHILS # BLD AUTO: 0 THOU/UL (ref 0–0.2)
BASOPHILS NFR BLD AUTO: 1 % (ref 0–2)
EOSINOPHIL # BLD AUTO: 0.3 THOU/UL (ref 0–0.4)
EOSINOPHIL NFR BLD AUTO: 5 % (ref 0–6)
ERYTHROCYTE [DISTWIDTH] IN BLOOD BY AUTOMATED COUNT: 15.3 % (ref 11–14.5)
FERRITIN SERPL-MCNC: 195 NG/ML (ref 10–130)
FOLATE SERPL-MCNC: 7.7 NG/ML
HCT VFR BLD AUTO: 34.9 % (ref 35–47)
HGB BLD-MCNC: 10.4 G/DL (ref 12–16)
IRON SATN MFR SERPL: 25 % (ref 20–50)
IRON SERPL-MCNC: 59 UG/DL (ref 42–175)
LYMPHOCYTES # BLD AUTO: 1.8 THOU/UL (ref 0.8–4.4)
LYMPHOCYTES NFR BLD AUTO: 29 % (ref 20–40)
MCH RBC QN AUTO: 28.5 PG (ref 27–34)
MCHC RBC AUTO-ENTMCNC: 29.8 G/DL (ref 32–36)
MCV RBC AUTO: 96 FL (ref 80–100)
MONOCYTES # BLD AUTO: 0.6 THOU/UL (ref 0–0.9)
MONOCYTES NFR BLD AUTO: 9 % (ref 2–10)
NEUTROPHILS # BLD AUTO: 3.5 THOU/UL (ref 2–7.7)
NEUTROPHILS NFR BLD AUTO: 57 % (ref 50–70)
PLATELET # BLD AUTO: 244 THOU/UL (ref 140–440)
PMV BLD AUTO: 9.5 FL (ref 8.5–12.5)
RBC # BLD AUTO: 3.65 MILL/UL (ref 3.8–5.4)
TIBC SERPL-MCNC: 237 UG/DL (ref 313–563)
TRANSFERRIN SERPL-MCNC: 189 MG/DL (ref 212–360)
VIT B12 SERPL-MCNC: 544 PG/ML (ref 213–816)
WBC: 6.4 THOU/UL (ref 4–11)

## 2019-12-09 ENCOUNTER — APPOINTMENT (OUTPATIENT)
Dept: GENERAL RADIOLOGY | Facility: CLINIC | Age: 74
End: 2019-12-09
Attending: FAMILY MEDICINE
Payer: COMMERCIAL

## 2019-12-09 ENCOUNTER — HOSPITAL ENCOUNTER (EMERGENCY)
Facility: CLINIC | Age: 74
Discharge: HOME | End: 2019-12-09
Attending: FAMILY MEDICINE | Admitting: FAMILY MEDICINE
Payer: COMMERCIAL

## 2019-12-09 VITALS
HEART RATE: 111 BPM | OXYGEN SATURATION: 95 % | RESPIRATION RATE: 16 BRPM | TEMPERATURE: 98.4 F | SYSTOLIC BLOOD PRESSURE: 117 MMHG | DIASTOLIC BLOOD PRESSURE: 84 MMHG

## 2019-12-09 DIAGNOSIS — S80.02XA CONTUSION OF LEFT KNEE, INITIAL ENCOUNTER: ICD-10-CM

## 2019-12-09 PROCEDURE — 73560 X-RAY EXAM OF KNEE 1 OR 2: CPT | Mod: LT

## 2019-12-09 PROCEDURE — 99284 EMERGENCY DEPT VISIT MOD MDM: CPT | Mod: Z6 | Performed by: FAMILY MEDICINE

## 2019-12-09 PROCEDURE — 99284 EMERGENCY DEPT VISIT MOD MDM: CPT | Performed by: FAMILY MEDICINE

## 2019-12-09 PROCEDURE — 73590 X-RAY EXAM OF LOWER LEG: CPT | Mod: LT

## 2019-12-09 PROCEDURE — 25000132 ZZH RX MED GY IP 250 OP 250 PS 637: Performed by: FAMILY MEDICINE

## 2019-12-09 RX ORDER — ACETAMINOPHEN 500 MG
1000 TABLET ORAL ONCE
Status: COMPLETED | OUTPATIENT
Start: 2019-12-09 | End: 2019-12-09

## 2019-12-09 RX ADMIN — ACETAMINOPHEN 1000 MG: 500 TABLET ORAL at 18:22
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
## 2019-12-09 NOTE — ED AVS SNAPSHOT
[facility], [place], Emergency Department  [address]  [place] [address]  Phone:  [phone]  Fax:  [phone]                                    Lynda Delgadillo   MRN: [id number]    Department:  [facility], Emergency Department   Date of Visit:  12/9/2019           After Visit Summary Signature Page    I have received my discharge instructions, and my questions have been answered. I have discussed any challenges I see with this plan with the nurse or doctor.    ..........................................................................................................................................  Patient/Patient Representative Signature      ..........................................................................................................................................  Patient Representative Print Name and Relationship to Patient    ..................................................               ................................................  Date                                   Time    ..........................................................................................................................................  Reviewed by Signature/Title    ...................................................              ..............................................  Date                                               Time          22EPIC Rev 08/18

## 2019-12-10 NOTE — DISCHARGE INSTRUCTIONS
Thank you for choosing [facility].     Please closely monitor for further symptoms. Return to the Emergency Department if you develop any new or worsening signs or symptoms.    If you received any opiate pain medications or sedatives during your visit, please do not drive for at least 8 hours.     Labs, cultures or final xray interpretations may still need to be reviewed.  We will call you if your plan of care needs to be changed.    Please follow up with your primary care physician or clinic.

## 2019-12-10 NOTE — ED NOTES
Pt given PO meds for her pain.  Pt earlier had asked to use the BR and when offered a w/c to get to the BR and or to use a BSC.  Pt declined both options and stated she wanted to wait.  Pt now asked if she could walk to the BR and when shown were the BR stated she was going to wait after walking longterm to the BR and went back to her room.

## 2025-04-03 ENCOUNTER — LAB REQUISITION (OUTPATIENT)
Dept: LAB | Facility: CLINIC | Age: 80
End: 2025-04-03
Payer: COMMERCIAL

## 2025-04-03 DIAGNOSIS — N18.9 CHRONIC KIDNEY DISEASE, UNSPECIFIED: ICD-10-CM

## 2025-04-03 DIAGNOSIS — E55.9 VITAMIN D DEFICIENCY, UNSPECIFIED: ICD-10-CM

## 2025-04-03 DIAGNOSIS — D64.9 ANEMIA, UNSPECIFIED: ICD-10-CM

## 2025-04-03 DIAGNOSIS — E03.9 HYPOTHYROIDISM, UNSPECIFIED: ICD-10-CM

## 2025-04-03 LAB
ALBUMIN SERPL BCG-MCNC: 3.5 G/DL (ref 3.5–5.2)
ALP SERPL-CCNC: 56 U/L (ref 40–150)
ALT SERPL W P-5'-P-CCNC: 6 U/L (ref 0–50)
ANION GAP SERPL CALCULATED.3IONS-SCNC: 9 MMOL/L (ref 7–15)
AST SERPL W P-5'-P-CCNC: 8 U/L (ref 0–45)
BILIRUB SERPL-MCNC: 0.2 MG/DL
BUN SERPL-MCNC: 33.3 MG/DL (ref 8–23)
CALCIUM SERPL-MCNC: 9.3 MG/DL (ref 8.8–10.4)
CHLORIDE SERPL-SCNC: 102 MMOL/L (ref 98–107)
CREAT SERPL-MCNC: 0.88 MG/DL (ref 0.51–0.95)
EGFRCR SERPLBLD CKD-EPI 2021: 67 ML/MIN/1.73M2
ERYTHROCYTE [DISTWIDTH] IN BLOOD BY AUTOMATED COUNT: 12.9 % (ref 10–15)
EST. AVERAGE GLUCOSE BLD GHB EST-MCNC: 103 MG/DL
GLUCOSE SERPL-MCNC: 99 MG/DL (ref 70–99)
HBA1C MFR BLD: 5.2 %
HCO3 SERPL-SCNC: 31 MMOL/L (ref 22–29)
HCT VFR BLD AUTO: 37.1 % (ref 35–47)
HGB BLD-MCNC: 11.8 G/DL (ref 11.7–15.7)
MCH RBC QN AUTO: 30.9 PG (ref 26.5–33)
MCHC RBC AUTO-ENTMCNC: 31.8 G/DL (ref 31.5–36.5)
MCV RBC AUTO: 97 FL (ref 78–100)
PLATELET # BLD AUTO: 160 10E3/UL (ref 150–450)
POTASSIUM SERPL-SCNC: 4.4 MMOL/L (ref 3.4–5.3)
PROT SERPL-MCNC: 6 G/DL (ref 6.4–8.3)
RBC # BLD AUTO: 3.82 10E6/UL (ref 3.8–5.2)
SODIUM SERPL-SCNC: 142 MMOL/L (ref 135–145)
TSH SERPL DL<=0.005 MIU/L-ACNC: 2.14 UIU/ML (ref 0.3–4.2)
VALPROATE SERPL-MCNC: 51.8 UG/ML
VIT D+METAB SERPL-MCNC: 39 NG/ML (ref 20–50)
WBC # BLD AUTO: 6.9 10E3/UL (ref 4–11)

## 2025-04-03 PROCEDURE — 84443 ASSAY THYROID STIM HORMONE: CPT | Mod: ORL

## 2025-04-03 PROCEDURE — 83036 HEMOGLOBIN GLYCOSYLATED A1C: CPT | Mod: ORL

## 2025-04-03 PROCEDURE — 80164 ASSAY DIPROPYLACETIC ACD TOT: CPT | Mod: ORL

## 2025-04-03 PROCEDURE — 85027 COMPLETE CBC AUTOMATED: CPT | Mod: ORL

## 2025-04-03 PROCEDURE — 82306 VITAMIN D 25 HYDROXY: CPT | Mod: ORL

## 2025-04-03 PROCEDURE — 80053 COMPREHEN METABOLIC PANEL: CPT | Mod: ORL

## (undated) RX ORDER — DOBUTAMINE HYDROCHLORIDE 200 MG/100ML
INJECTION INTRAVENOUS
Status: DISPENSED
Start: 2017-02-13